# Patient Record
Sex: MALE | Race: WHITE
[De-identification: names, ages, dates, MRNs, and addresses within clinical notes are randomized per-mention and may not be internally consistent; named-entity substitution may affect disease eponyms.]

---

## 2018-04-09 NOTE — ED
Fall HPI





- General


Chief Complaint: Fall


Stated Complaint: fall


Time Seen by Provider: 04/08/18 23:37


Source: patient, EMS


Mode of arrival: EMS


Limitations: physical limitation (Appears intoxicated)





- History of Present Illness


Initial Comments: 





This patient is an 83-year-old man, brought by ambulance to be evaluated after 

he had a fall.  The patient admits to drinking about 10 beers today.  He states 

that he is not sure exactly what happened but he had a falling and striking 

left side of his head.  Patient's neighbor then called the ambulance as well as 

patient's daughter.  Is alert and is denying any injuries other than to his 

head.  He states he has a little bit of headache but he denies pains anywhere 

else.


MD Complaint: fall


-: hour(s)


Fall From: standing


When Fall Occurred: 1 hour PTA


Fall Witnessed: no


Place Fall Occurred: home


Loss of Consciousness: unsure


Prolonged Down Time?: no


Context: alcohol use





- Related Data


 Home Medications











 Medication  Instructions  Recorded  Confirmed


 


Ascorbic Acid [Vitamin C] 500 mg PO DAILY@1200 01/21/15 10/06/16


 


Aspirin 325 mg PO DAILY 01/21/15 10/06/16


 


Cholecalciferol [Vitamin D3] 1,000 unit PO DAILY@1200 01/21/15 10/06/16


 


Citalopram Hydrobromide [CeleXA] 20 mg PO DAILY 01/21/15 10/06/16


 


Fenofibrate Nanocrystallized 145 mg PO DAILY 01/21/15 10/06/16





[Tricor]   


 


Lisinopril [Zestril] 10 mg PO DAILY 01/21/15 10/06/16


 


Loratadine [Claritin] 10 mg PO DAILY 01/21/15 10/06/16


 


Thiamine HCl [Vitamin B-1] 100 mg PO DAILY 01/21/15 10/06/16








 Previous Rx's











 Medication  Instructions  Recorded


 


Docusate [Colace] 100 mg PO BID #28 capsule 10/08/16


 


Enoxaparin [Lovenox] 40 mg SQ DAILY 28 Days  syringe 10/08/16


 


HYDROcodone/APAP 5-325MG [Norco 1 tab PO Q4HR PRN #40 tab 10/08/16





5-325]  


 


Budesonide-Formot 160-4.5 Mcg 2 puff INHALATION BID #1 inhaler 10/12/16





[Symbicort 160-4.5 Mcg Inhaler]  


 


Ipratropium-Albuterol Nebulize 3 ml INHALATION RT-QID  ampul.neb 10/12/16





[Duoneb 0.5 mg-3 mg/3 ml Soln]  


 


Metoprolol Tartrate [Lopressor] 12.5 mg PO BID  tab 10/12/16


 


Nicotine 21Mg/24Hr Patch [Habitrol] 1 patch TRANSDERM DAILY  patch 10/12/16











 Allergies











Allergy/AdvReac Type Severity Reaction Status Date / Time


 


No Known Allergies Allergy   Verified 10/06/16 16:10














Review of Systems


ROS Statement: 


Those systems with pertinent positive or pertinent negative responses have been 

documented in the HPI.





ROS Other: All systems not noted in ROS Statement are negative.


Limitations: ROS unobtainable due to patients medical condition (Appears 

intoxicated)


Eyes: Denies: vision change


Respiratory: Denies: cough, dyspnea


Cardiovascular: Denies: chest pain


Gastrointestinal: Denies: abdominal pain, vomiting


Musculoskeletal: Denies: back pain


Neurological: Reports: headache.  Denies: weakness, numbness





Past Medical History


Past Medical History: CVA/TIA, Hyperlipidemia, Hypertension


History of Any Multi-Drug Resistant Organisms: None Reported


Past Surgical History: Hernia Repair, Orthopedic Surgery


Additional Past Surgical History / Comment(s): SHOULDER LEFT 25 YEARS AGO


Past Anesthesia/Blood Transfusion Reactions: No Reported Reaction


Past Psychological History: No Psychological Hx Reported


Smoking Status: Current every day smoker


Past Alcohol Use History: Daily


Past Drug Use History: None Reported





- Past Family History


  ** Mother


Family Medical History: Hypertension





General Exam


Limitations: no limitations


General appearance: alert, appears intoxicated


Head exam: Present: normocephalic, other (Left frontal area hematoma with some 

abrasion overlying.  No obvious bony deformity.  Mild localized tenderness.)


Eye exam: Present: normal appearance, PERRL, EOMI, nystagmus.  Absent: scleral 

icterus, conjunctival injection


ENT exam: Present: mucous membranes dry


Neck exam: Present: normal inspection, other (Cervical collar).  Absent: 

tenderness


Respiratory exam: Present: normal lung sounds bilaterally.  Absent: respiratory 

distress, wheezes, rales, rhonchi, stridor


Cardiovascular Exam: Present: regular rate, normal rhythm, normal heart sounds.

  Absent: systolic murmur, diastolic murmur, rubs, gallop


GI/Abdominal exam: Present: soft.  Absent: distended, tenderness, guarding, 

rebound, mass


Extremities exam: Present: normal inspection, full ROM, normal capillary 

refill.  Absent: tenderness


Back exam: Present: normal inspection.  Absent: CVA tenderness (R), CVA 

tenderness (L)


Neurological exam: Present: alert


Skin exam: Present: warm, dry, intact, normal color.  Absent: rash





Course


 Vital Signs











  04/08/18





  23:19


 


Temperature 96.3 F L


 


Pulse Rate 79


 


Respiratory 18





Rate 


 


Blood Pressure 166/74


 


O2 Sat by Pulse 92 L





Oximetry 














Medical Decision Making





- EKG Data


-: EKG Interpreted by Me


EKG shows normal: sinus rhythm, axis (Normal), intervals (Normal), QRS 

complexes (Low-voltage QRS), ST-T waves (Normal)


Rate: normal (78 bpm)





Disposition


Clinical Impression: 


 Fall, Head injury, Alcohol intoxication





Disposition: HOME SELF-CARE


Condition: Fair


Instructions:  Fall Prevention for Older Adults (ED), Head Injury (ED), Alcohol 

Intoxication (ED)


Referrals: 


José Manuel Flores DO [Primary Care Provider] - 1-2 days

## 2018-04-09 NOTE — CT
EXAMINATION TYPE: CT brain cspine wo con

 

DATE OF EXAM: 4/9/2018

 

COMPARISON: NONE

 

HISTORY: Fall

 

CT DLP: 1562.30 mGycm

Automated exposure control for dose reduction was used.

 

TECHNIQUE: CT scan of the head and cervical spine are performed without contrast.

 

FINDINGS:   There is a large left frontal scalp hematoma that measures up to 1.7 cm in thickness.

 

There is cerebral cortical atrophy. There is no mass effect nor midline shift. There is no sign of in
tracranial hemorrhage. There is patchy hypodensity in the periventricular white matter. There is no e
vidence of a skull fracture.

 

Cervical vertebra have normal alignment. There is narrowing of disc spaces from C3 to C7 with spurrin
g of the endplates. Facet joints are intact. There is hypertrophic multilevel facet arthropathy. The 
skull base is intact. There is no evidence of a fracture.

 

IMPRESSION:

Large left frontal scalp hematoma. Cerebral atrophy and chronic small vessel ischemia. No acute intra
cranial abnormality.

 

Spondylotic changes in the cervical spine. 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 No acute frac
ture seen. Pulmonary emphysema noted at the lung apices.

## 2018-08-08 NOTE — CT
EXAMINATION TYPE: CT brain cspine wo con

 

DATE OF EXAM: 8/8/2018

 

COMPARISON: CT brain and cervical spine April 8, 2018.

 

HISTORY: Pt fell on Monday found today on the floor /abrasions to lt cheek area. Neck pain.

 

CT DLP: 1429.3 mGycm. Automated Exposure Control for Dose Reduction was Utilized.

 

 

TECHNIQUE: CT scan of the head and cervical spine are performed without contrast.

 

FINDINGS:   There is no acute intracranial hemorrhage or midline shift identified. There is ventricul
ar and sulcal prominence consistent with diffuse cerebral atrophy. There is focal and confluent areas
 of T2 hyperintensity seen throughout the deep and periventricular white matter bilaterally. There is
 minimal residual left frontal scalp hematoma or scar axial image 41. There is new small right pariet
al acute scalp hematoma axial image 45. The calvarium is intact. The globes are intact and the visual
ized sinuses are clear.

 

Cervical spine is visualized in its entirety from C1 through upper thoracic levels and demonstrates s
table and somewhat straightened alignment without evidence of acute fracture or dislocation. Stable n
onunited fracture of C7 spinous process sagittal image 36 noted. Prevertebral soft tissue appears wit
hin normal limits.  The C1-C2 articulation is within normal limits on the coronal images.

 

Vertebral body heights are maintained. Moderate multilevel disc space narrowing C3-C4 through C6-C7 l
evels is redemonstrated. Posterior spurring effaces anterior thecal sac at C6-C7 level similar prior.
 Review of axial images shows multilevel uncovertebral facet degenerative changes contributing to mul
tilevel neural foraminal narrowing most prominent left C3-C4 level similar to prior. There is motion 
artifact degradation is seen. There is moderate to severe calcified plaque at bilateral carotid bulbs
. There is at least moderate underlying emphysematous change with moderate biapical pleural/parenchym
al scarring redemonstrated.

 

IMPRESSION:

1. There is no acute fracture or dislocation evident in the cervical spine. No significant change fro
m prior CT.

2. No acute intracranial hemorrhage or midline shift is seen currently. Background moderate diffuse c
erebral atrophy and advanced chronic small vessel ischemic change redemonstrated. New small right acu
te parietal scalp hematoma noted.

## 2018-08-08 NOTE — ED
General Adult HPI





- General


Stated complaint: Fall


Time Seen by Provider: 08/08/18 12:05


Source: patient, EMS, RN notes reviewed, old records reviewed


Mode of arrival: EMS





- History of Present Illness


Initial comments: 





This is a 83-year-old male history of CVA alcoholism who apparently became weak 

2 days ago and follow for cannot get up.  He was found lying on the floor on 

his left side.  Is brought in by EMS for evaluation.  Patient was C-collared he 

denies nausea vomiting cough or phlegm production.  He is found to have 

multiple pressure sores by paramedics IV was established.  He was transported 

here.





- Related Data


 Home Medications











 Medication  Instructions  Recorded  Confirmed


 


Ascorbic Acid [Vitamin C] 500 mg PO DAILY@1200 01/21/15 08/08/18


 


Cholecalciferol [Vitamin D3] 1,000 unit PO DAILY@1200 01/21/15 08/08/18


 


Citalopram Hydrobromide [CeleXA] 30 mg PO DAILY 01/21/15 08/08/18


 


Fenofibrate Nanocrystallized 145 mg PO DAILY 01/21/15 08/08/18





[Tricor]   


 


Lisinopril [Zestril] 10 mg PO DAILY 01/21/15 08/08/18


 


Thiamine HCl [Vitamin B-1] 100 mg PO DAILY 01/21/15 08/08/18


 


Aspirin EC [Ecotrin Low Dose] 81 mg PO DAILY 08/08/18 08/08/18











 Allergies











Allergy/AdvReac Type Severity Reaction Status Date / Time


 


No Known Allergies Allergy   Verified 10/06/16 16:10














Review of Systems


ROS Statement: 


Those systems with pertinent positive or pertinent negative responses have been 

documented in the HPI.





ROS Other: All systems not noted in ROS Statement are negative.





Past Medical History


Past Medical History: CVA/TIA, Hyperlipidemia, Hypertension


History of Any Multi-Drug Resistant Organisms: None Reported


Past Surgical History: Hernia Repair, Orthopedic Surgery


Additional Past Surgical History / Comment(s): SHOULDER LEFT 25 YEARS AGO


Past Anesthesia/Blood Transfusion Reactions: No Reported Reaction


Past Psychological History: No Psychological Hx Reported


Smoking Status: Current every day smoker


Past Alcohol Use History: Daily


Past Drug Use History: None Reported





- Past Family History


  ** Mother


Family Medical History: Hypertension





General Exam





- General Exam Comments


Initial Comments: 





This is a well-developed sec appearing male who is awake alert but lethargic.  

He does have a cervical collar on.  He seems a have a Sonia Coma Scale of 15


Limitations: physical limitation


General appearance: alert, lethargic


Head exam: Present: normocephalic, other (Pressure sores seen to the left cheek 

and facial area with left periorbital edema)


Eye exam: Present: PERRL, EOMI


ENT exam: Present: mucous membranes dry


Neck exam: Present: normal inspection, other (Cervical collar in place no overt 

tenderness palpation of the spinous processes.)


Respiratory exam: Present: decreased breath sounds


Cardiovascular Exam: Present: normal rhythm, tachycardia, normal heart sounds.  

Absent: systolic murmur, diastolic murmur, rubs, gallop, clicks


GI/Abdominal exam: Present: soft.  Absent: tenderness


Rectal exam: Present: deferred


Extremities exam: Present: full ROM, normal capillary refill


Back exam: Present: normal inspection


Neurological exam: Present: alert, CN II-XII intact


Psychiatric exam: Present: flat affect


Skin exam: Present: warm, dry, other (Multiple pressure sores noted to the left 

face left shoulder left anterior chest and lower chest.  Left dorsal wrist and 

forearm left knee and left anterior lateral leg with some small amount seen on 

the right knee.  Some eschar formation is noted.)





Course


 Vital Signs











  08/08/18 08/08/18 08/08/18





  12:05 13:00 14:00


 


Temperature 97.1 F L  


 


Pulse Rate 105 H 105 H 95


 


Respiratory 18 18 18





Rate   


 


Blood Pressure 140/76 136/76 138/75


 


O2 Sat by Pulse 100 98 98





Oximetry   














  08/08/18





  14:32


 


Temperature 


 


Pulse Rate 97


 


Respiratory 18





Rate 


 


Blood Pressure 128/82


 


O2 Sat by Pulse 98





Oximetry 














- Reevaluation(s)


Reevaluation #1: 





08/08/18 15:49


Patient continues rest comfortably I did discuss findings with him and his 

family member.





EKG Findings





- EKG Results:


EKG: interpreted by [facility]MRON, sinus rhythm (Sinus rhythm rate 99.  Interval 136 QRS 

90 QT since /479 low-voltage poor R-wave progression)





Medical Decision Making





- Medical Decision Making





I did discuss findings with the patient family members as well as with Dr. Gonzalez.  Patient is a patient Dr. Flores.  Patient will be admitted IV 

hydration and inpatient evaluation.  Patient has a known history of smoking and 

alcohol abuse he will be placed on the protocol.





- Lab Data


Result diagrams: 


 08/08/18 13:40





 08/08/18 13:40


 Lab Results











  08/08/18 08/08/18 08/08/18 Range/Units





  13:40 13:40 13:40 


 


WBC     (3.8-10.6)  k/uL


 


RBC     (4.30-5.90)  m/uL


 


Hgb     (13.0-17.5)  gm/dL


 


Hct     (39.0-53.0)  %


 


MCV     (80.0-100.0)  fL


 


MCH     (25.0-35.0)  pg


 


MCHC     (31.0-37.0)  g/dL


 


RDW     (11.5-15.5)  %


 


Plt Count     (150-450)  k/uL


 


Neutrophils %     %


 


Lymphocytes %     %


 


Monocytes %     %


 


Eosinophils %     %


 


Basophils %     %


 


Neutrophils #     (1.3-7.7)  k/uL


 


Lymphocytes #     (1.0-4.8)  k/uL


 


Monocytes #     (0-1.0)  k/uL


 


Eosinophils #     (0-0.7)  k/uL


 


Basophils #     (0-0.2)  k/uL


 


Sodium   142   (137-145)  mmol/L


 


Potassium   4.3   (3.5-5.1)  mmol/L


 


Chloride   111 H   ()  mmol/L


 


Carbon Dioxide   23   (22-30)  mmol/L


 


Anion Gap   8   mmol/L


 


BUN   50 H   (9-20)  mg/dL


 


Creatinine   1.75 H   (0.66-1.25)  mg/dL


 


Est GFR (CKD-EPI)AfAm   41   (>60 ml/min/1.73 sqM)  


 


Est GFR (CKD-EPI)NonAf   36   (>60 ml/min/1.73 sqM)  


 


Glucose   133 H   (74-99)  mg/dL


 


POC Glucose (mg/dL)     (75-99)  mg/dL


 


POC Glu Operater ID     


 


Plasma Lactic Acid Alberto  2.0    (0.7-2.0)  mmol/L


 


Calcium   9.8   (8.4-10.2)  mg/dL


 


Magnesium   2.4 H   (1.6-2.3)  mg/dL


 


Total Bilirubin   1.4 H   (0.2-1.3)  mg/dL


 


AST   98 H   (17-59)  U/L


 


ALT   51   (21-72)  U/L


 


Alkaline Phosphatase   71   ()  U/L


 


Ammonia  23    (<30)  umol/L


 


Total Creatine Kinase    1323 H  ()  U/L


 


CK-MB (CK-2)    7.6 H*  (0.0-2.4)  ng/mL


 


CK-MB (CK-2) Rel Index    0.6  


 


Troponin I    0.141 H*  (0.000-0.034)  ng/mL


 


Total Protein   6.5   (6.3-8.2)  g/dL


 


Albumin   3.8   (3.5-5.0)  g/dL


 


Lipase   37   ()  U/L


 


Urine Color     


 


Urine Appearance     (Clear)  


 


Urine pH     (5.0-8.0)  


 


Ur Specific Gravity     (1.001-1.035)  


 


Urine Protein     (Negative)  


 


Urine Glucose (UA)     (Negative)  


 


Urine Ketones     (Negative)  


 


Urine Blood     (Negative)  


 


Urine Nitrite     (Negative)  


 


Urine Bilirubin     (Negative)  


 


Urine Urobilinogen     (<2.0)  mg/dL


 


Ur Leukocyte Esterase     (Negative)  


 


Urine RBC     (0-5)  /hpf


 


Urine WBC     (0-5)  /hpf


 


Ur Squamous Epith Cells     (0-4)  /hpf


 


Hyaline Casts     (0-2)  /lpf


 


Urine Mucus     (None)  /hpf


 


Serum Alcohol   <10   mg/dL














  08/08/18 08/08/18 08/08/18 Range/Units





  13:40 14:59 15:10 


 


WBC  12.5 H    (3.8-10.6)  k/uL


 


RBC  5.37    (4.30-5.90)  m/uL


 


Hgb  16.3    (13.0-17.5)  gm/dL


 


Hct  50.2    (39.0-53.0)  %


 


MCV  93.5    (80.0-100.0)  fL


 


MCH  30.3    (25.0-35.0)  pg


 


MCHC  32.4    (31.0-37.0)  g/dL


 


RDW  13.6    (11.5-15.5)  %


 


Plt Count  401    (150-450)  k/uL


 


Neutrophils %  82    %


 


Lymphocytes %  7    %


 


Monocytes %  9    %


 


Eosinophils %  0    %


 


Basophils %  0    %


 


Neutrophils #  10.2 H    (1.3-7.7)  k/uL


 


Lymphocytes #  0.9 L    (1.0-4.8)  k/uL


 


Monocytes #  1.1 H    (0-1.0)  k/uL


 


Eosinophils #  0.0    (0-0.7)  k/uL


 


Basophils #  0.0    (0-0.2)  k/uL


 


Sodium     (137-145)  mmol/L


 


Potassium     (3.5-5.1)  mmol/L


 


Chloride     ()  mmol/L


 


Carbon Dioxide     (22-30)  mmol/L


 


Anion Gap     mmol/L


 


BUN     (9-20)  mg/dL


 


Creatinine     (0.66-1.25)  mg/dL


 


Est GFR (CKD-EPI)AfAm     (>60 ml/min/1.73 sqM)  


 


Est GFR (CKD-EPI)NonAf     (>60 ml/min/1.73 sqM)  


 


Glucose     (74-99)  mg/dL


 


POC Glucose (mg/dL)   135 H   (75-99)  mg/dL


 


POC Glu Operater ID   Arun Jones   


 


Plasma Lactic Acid Alberto     (0.7-2.0)  mmol/L


 


Calcium     (8.4-10.2)  mg/dL


 


Magnesium     (1.6-2.3)  mg/dL


 


Total Bilirubin     (0.2-1.3)  mg/dL


 


AST     (17-59)  U/L


 


ALT     (21-72)  U/L


 


Alkaline Phosphatase     ()  U/L


 


Ammonia     (<30)  umol/L


 


Total Creatine Kinase     ()  U/L


 


CK-MB (CK-2)     (0.0-2.4)  ng/mL


 


CK-MB (CK-2) Rel Index     


 


Troponin I     (0.000-0.034)  ng/mL


 


Total Protein     (6.3-8.2)  g/dL


 


Albumin     (3.5-5.0)  g/dL


 


Lipase     ()  U/L


 


Urine Color    Orange  


 


Urine Appearance    Cloudy  (Clear)  


 


Urine pH    5.5  (5.0-8.0)  


 


Ur Specific Gravity    1.020  (1.001-1.035)  


 


Urine Protein    1+ H  (Negative)  


 


Urine Glucose (UA)    Negative  (Negative)  


 


Urine Ketones    Negative  (Negative)  


 


Urine Blood    Negative  (Negative)  


 


Urine Nitrite    Negative  (Negative)  


 


Urine Bilirubin    1+ H  (Negative)  


 


Urine Urobilinogen    2.0  (<2.0)  mg/dL


 


Ur Leukocyte Esterase    Negative  (Negative)  


 


Urine RBC    <1  (0-5)  /hpf


 


Urine WBC    2  (0-5)  /hpf


 


Ur Squamous Epith Cells    1  (0-4)  /hpf


 


Hyaline Casts    35 H  (0-2)  /lpf


 


Urine Mucus    Moderate H  (None)  /hpf


 


Serum Alcohol     mg/dL














- Radiology Data


Radiology results: report reviewed (I did review the imaging and report no 

acute findings are seen.), image reviewed





Critical Care Time


Critical Care Time: Yes


Critical Care Time: 





34 minutes of critical care time which includes initial presentation monitoring 

the EMS run and discussed with paramedics labs x-rays.  Multiple re-evaluations 

of the patient.  Discussion with the family discuss with the main physician 

documentation above and admission orders





Disposition


Clinical Impression: 


 Fall, History of ETOH abuse, Rhabdomyolysis, Renal insufficiency, Dehydration, 

Failure to thrive, Smoking, Elevated troponin





Disposition: ADMITTED AS IP TO THIS HOSP


Condition: Serious


Referrals: 


José Manuel Flores DO [Primary Care Provider] - 1-2 days

## 2018-08-08 NOTE — XR
EXAMINATION TYPE: XR chest 1V portable

 

DATE OF EXAM: 8/8/2018

 

COMPARISON: Prior chest x-ray 10/11/2016

 

HISTORY: Pain, weakness and fall, trauma

 

TECHNIQUE: Single frontal view of the chest is obtained.

 

FINDINGS:  Posttraumatic changes are stable, postop change noted to the left humerus, old left clavic
ular fracture, old right-sided rib fractures at the seventh and eighth ribs, likely ninth and 10th ri
bs again noted, possibly left eighth and ninth ribs show prior fracture. There are overlying cardiac 
leads. No evident pneumothorax or pleural effusion. Cardiac mediastinal silhouette, pulmonary vascula
rity and elin are stable.

 

IMPRESSION:  Evidence of remote trauma. No acute abnormality.

## 2018-08-08 NOTE — ED
Medical Decision Making





- Lab Data


Result diagrams: 


 08/08/18 13:40





 08/08/18 13:40





 Lab Results











  08/08/18 08/08/18 08/08/18 Range/Units





  13:40 13:40 13:40 


 


WBC     (3.8-10.6)  k/uL


 


RBC     (4.30-5.90)  m/uL


 


Hgb     (13.0-17.5)  gm/dL


 


Hct     (39.0-53.0)  %


 


MCV     (80.0-100.0)  fL


 


MCH     (25.0-35.0)  pg


 


MCHC     (31.0-37.0)  g/dL


 


RDW     (11.5-15.5)  %


 


Plt Count     (150-450)  k/uL


 


Neutrophils %     %


 


Lymphocytes %     %


 


Monocytes %     %


 


Eosinophils %     %


 


Basophils %     %


 


Neutrophils #     (1.3-7.7)  k/uL


 


Lymphocytes #     (1.0-4.8)  k/uL


 


Monocytes #     (0-1.0)  k/uL


 


Eosinophils #     (0-0.7)  k/uL


 


Basophils #     (0-0.2)  k/uL


 


Sodium   142   (137-145)  mmol/L


 


Potassium   4.3   (3.5-5.1)  mmol/L


 


Chloride   111 H   ()  mmol/L


 


Carbon Dioxide   23   (22-30)  mmol/L


 


Anion Gap   8   mmol/L


 


BUN   50 H   (9-20)  mg/dL


 


Creatinine   1.75 H   (0.66-1.25)  mg/dL


 


Est GFR (CKD-EPI)AfAm   41   (>60 ml/min/1.73 sqM)  


 


Est GFR (CKD-EPI)NonAf   36   (>60 ml/min/1.73 sqM)  


 


Glucose   133 H   (74-99)  mg/dL


 


POC Glucose (mg/dL)     (75-99)  mg/dL


 


POC Glu Operater ID     


 


Plasma Lactic Acid Alberto  2.0    (0.7-2.0)  mmol/L


 


Calcium   9.8   (8.4-10.2)  mg/dL


 


Magnesium   2.4 H   (1.6-2.3)  mg/dL


 


Total Bilirubin   1.4 H   (0.2-1.3)  mg/dL


 


AST   98 H   (17-59)  U/L


 


ALT   51   (21-72)  U/L


 


Alkaline Phosphatase   71   ()  U/L


 


Ammonia  23    (<30)  umol/L


 


Total Creatine Kinase    1323 H  ()  U/L


 


CK-MB (CK-2)    7.6 H*  (0.0-2.4)  ng/mL


 


CK-MB (CK-2) Rel Index    0.6  


 


Troponin I    0.141 H*  (0.000-0.034)  ng/mL


 


Total Protein   6.5   (6.3-8.2)  g/dL


 


Albumin   3.8   (3.5-5.0)  g/dL


 


Lipase   37   ()  U/L


 


Urine Color     


 


Urine Appearance     (Clear)  


 


Urine pH     (5.0-8.0)  


 


Ur Specific Gravity     (1.001-1.035)  


 


Urine Protein     (Negative)  


 


Urine Glucose (UA)     (Negative)  


 


Urine Ketones     (Negative)  


 


Urine Blood     (Negative)  


 


Urine Nitrite     (Negative)  


 


Urine Bilirubin     (Negative)  


 


Urine Urobilinogen     (<2.0)  mg/dL


 


Ur Leukocyte Esterase     (Negative)  


 


Urine RBC     (0-5)  /hpf


 


Urine WBC     (0-5)  /hpf


 


Ur Squamous Epith Cells     (0-4)  /hpf


 


Hyaline Casts     (0-2)  /lpf


 


Urine Mucus     (None)  /hpf


 


Serum Alcohol   <10   mg/dL














  08/08/18 08/08/18 08/08/18 Range/Units





  13:40 14:59 15:10 


 


WBC  12.5 H    (3.8-10.6)  k/uL


 


RBC  5.37    (4.30-5.90)  m/uL


 


Hgb  16.3    (13.0-17.5)  gm/dL


 


Hct  50.2    (39.0-53.0)  %


 


MCV  93.5    (80.0-100.0)  fL


 


MCH  30.3    (25.0-35.0)  pg


 


MCHC  32.4    (31.0-37.0)  g/dL


 


RDW  13.6    (11.5-15.5)  %


 


Plt Count  401    (150-450)  k/uL


 


Neutrophils %  82    %


 


Lymphocytes %  7    %


 


Monocytes %  9    %


 


Eosinophils %  0    %


 


Basophils %  0    %


 


Neutrophils #  10.2 H    (1.3-7.7)  k/uL


 


Lymphocytes #  0.9 L    (1.0-4.8)  k/uL


 


Monocytes #  1.1 H    (0-1.0)  k/uL


 


Eosinophils #  0.0    (0-0.7)  k/uL


 


Basophils #  0.0    (0-0.2)  k/uL


 


Sodium     (137-145)  mmol/L


 


Potassium     (3.5-5.1)  mmol/L


 


Chloride     ()  mmol/L


 


Carbon Dioxide     (22-30)  mmol/L


 


Anion Gap     mmol/L


 


BUN     (9-20)  mg/dL


 


Creatinine     (0.66-1.25)  mg/dL


 


Est GFR (CKD-EPI)AfAm     (>60 ml/min/1.73 sqM)  


 


Est GFR (CKD-EPI)NonAf     (>60 ml/min/1.73 sqM)  


 


Glucose     (74-99)  mg/dL


 


POC Glucose (mg/dL)   135 H   (75-99)  mg/dL


 


POC Glu Operater ID   Arun Jones   


 


Plasma Lactic Acid Alberto     (0.7-2.0)  mmol/L


 


Calcium     (8.4-10.2)  mg/dL


 


Magnesium     (1.6-2.3)  mg/dL


 


Total Bilirubin     (0.2-1.3)  mg/dL


 


AST     (17-59)  U/L


 


ALT     (21-72)  U/L


 


Alkaline Phosphatase     ()  U/L


 


Ammonia     (<30)  umol/L


 


Total Creatine Kinase     ()  U/L


 


CK-MB (CK-2)     (0.0-2.4)  ng/mL


 


CK-MB (CK-2) Rel Index     


 


Troponin I     (0.000-0.034)  ng/mL


 


Total Protein     (6.3-8.2)  g/dL


 


Albumin     (3.5-5.0)  g/dL


 


Lipase     ()  U/L


 


Urine Color    Orange  


 


Urine Appearance    Cloudy  (Clear)  


 


Urine pH    5.5  (5.0-8.0)  


 


Ur Specific Gravity    1.020  (1.001-1.035)  


 


Urine Protein    1+ H  (Negative)  


 


Urine Glucose (UA)    Negative  (Negative)  


 


Urine Ketones    Negative  (Negative)  


 


Urine Blood    Negative  (Negative)  


 


Urine Nitrite    Negative  (Negative)  


 


Urine Bilirubin    1+ H  (Negative)  


 


Urine Urobilinogen    2.0  (<2.0)  mg/dL


 


Ur Leukocyte Esterase    Negative  (Negative)  


 


Urine RBC    <1  (0-5)  /hpf


 


Urine WBC    2  (0-5)  /hpf


 


Ur Squamous Epith Cells    1  (0-4)  /hpf


 


Hyaline Casts    35 H  (0-2)  /lpf


 


Urine Mucus    Moderate H  (None)  /hpf


 


Serum Alcohol     mg/dL














Disposition


Clinical Impression: 


 Fall, History of ETOH abuse, Rhabdomyolysis, Renal insufficiency, Dehydration, 

Failure to thrive, Smoking, Elevated troponin, Pressure sore





Disposition: ADMITTED AS IP TO THIS HOSP


Condition: Serious


Referrals: 


José Manuel Flores DO [Primary Care Provider] - 1-2 days

## 2018-08-09 NOTE — P.CON
Consult Note





- .


Consult date: 08/09/18


Assessment/Plan:: 





This is an 83-year-old  male patient who currently lives alone.  He 

apparently fell on Monday and was laying on the floor until yesterday.  He 

states that he was feeling dizzy and fell.  He drank 3 beers that day but 

according to his daughter he states he drinks up to a 12 pack a day and has 

been doing this for many years.  Patient states that he was laying on his left 

side and his left hand and arm were underneath him and he was unable to move it 

out.  Patient was transported by EMS to [facility] emergency 

center for evaluation.  His ammonia level was 23, blood sugar in the 130s total 

bilirubin 1.4, AST 90, applying phosphatase and ALT within normal limits, CK 

was 1323.  BUN 15 creatinine 1.75 white count was at 12.  Troponin 0.141, 0.113

, 0.089.  Alcohol level was less than 10.  Urinalysis was orange with 1+ 

bilirubin.  Chest x-ray shows no acute abnormality but multiple old fractures.  

CAT scan of the head and cervical spine showed no acute fracture or dislocation 

of the cervical spine.  No acute intracranial hemorrhage or midline shift.  

Moderate diffuse cerebral atrophy and advanced chronic small vessel ischemic 

change.  New right parietal scalp hematoma.  Patient has been afebrile since 

admission.  He is not on antibiotics.  His tetanus status was updated in the 

emergency center.  Patient presents with multiple abrasions and wounds to his 

left temple and cheek, left hand and wrist dorsal and palmar surfaces, multiple 

areas on his left chest and abdomen as well as left lower extremity.  According 

to the patient's daughter patient has frequent falls although he will deny at.  

I discussed discharge planning with the patient and he is open to going to 

[facility] because he was there about 2 years ago after a hip 

fracture.  Daughter is DURABLE POWER OF  and family are hoping that 

they can make arrangements so he does not return home.  He has received 3 L of 

IV fluid, thiamine and CIWA protocol in place.  He does state that he has not 

been eating much due to lack of appetite and has lost 15 lbs over the past 

couple months.  He denies any nausea, vomiting, diarrhea.  He denies chest pain

, shortness of breath.  He has a chronic productive cough.  Please see the 

consult note is dictated by nurse practitioner Mrs. Barbra Bernstein.


The patient's daughter is present in relates to the patient's worsening status 

and concerns to her ability to care for himself.  The patient is evidence of 

the multiple wounds that developed from his fall and being unable to buy his 

own power get up off the floor.  We'll place mupirocin over the wounds to the 

face as well as to the chest wall and abdomen area.  To the left hand and 

palmar surface Silvadene will be applied will also be applied to the ulceration 

to the left leg and wrapped and place nonstick with gauze.


Antibiotic therapy originally was considered with vancomycin however with his 

elevated creatinine his changed to daptomycin while cultures are pending.  We'

ll obtain a whole-body bone scan to evaluate the areas of pressure to determine 

if there is potential underlying bony infection.  I agree with evaluation, 

assessment and plan as dictated by nurse practitioner Mrs. Barbra Bernstein.

## 2018-08-09 NOTE — HP
HISTORY AND PHYSICAL



DATE OF ADMISSION:

8/8/18



PRESENTING COMPLAINT:

Fall.



HISTORY OF PRESENTING COMPLAINT:

Pleasant 83-year-old patient of Dr. Flores.  Chronic stable medical conditions include

hyperlipidemia, hypertension, osteoarthritis, and tremors.  The patient is irregular at

baseline on his gait, but does not use any support.  The patient presented to the ER.

Daughter is at the bedside.  Last she talked on Sunday.  It was felt patient was on the

floor for at least a day and a half.  The patient does not remember what actually

happened.  Does not remember having any chest pains or palpitations.  The patient has

been dizzy at times.  No focal weakness.  The patient was found on the left side down

with a breakdown of skin in the left upper extremity, left hip, left lower extremity

where he was in contact with the ground.  Does feel tired and run down.  The patient

has been drinking for a long time, about 12 pack of beer and also up to about 3 packs

of cigarettes a day. Pretty run down. The patient in the ER was found to be in renal

failure.  Troponin leak.



REVIEW OF SYSTEMS:

CONSTITUTIONAL: Tired.

HEENT:  Decreased hearing.

RESPIRATORY: Some short of breath, cough.

CARDIOVASCULAR: No chest pain.

GASTROINTESTINAL: None.

GENITOURINARY: None.

MUSCULOSKELETAL:  Arthritic pain in many joints.

DERMATOLOGICAL:  Breakdown of skin especially on the left side as above.

HEMATOLOGICAL: None.

LYMPHATICS: None.

PSYCHIATRY: A bit forgetful.

NEUROLOGICAL: Nil focal.



PAST MEDICAL HISTORY:

Stroke, hyperlipidemia, hypertension, osteoarthritis, alcoholism, left humeral, left

femoral fracture, kidney stones.



PAST SURGICAL HISTORY:

Hernia repair, shoulder surgery, left hip hemiarthroplasty.



SOCIAL HISTORY:

Lives by himself, has a walking stick, does not use.  Smokes about 3 packs a day for

close to 70 years.  Also been drinking 12 pack of beer for a long time.  He worked as a

.



FAMILY HISTORY:

Hypertension.



HOME MEDICATIONS:

1. Thiamine 100 mg a day.

2. Zestril 10 mg a day.

3. Tricor 145 mg a day.

4. Celexa 30 mg a day.

5. Vitamin D3 1000 units a day.

6. Aspirin 81 mg a day.

7. Vitamin C 500 mg p.o. daily.



ALLERGIES:

None.



PHYSICAL EXAMINATION:

Vital signs on presentation: Temperature 97.5, pulse 93, respiratory 18, blood pressure

154/89, pulse ox 93% on 2 L.

GENERAL APPEARANCE:  Thin built, BMI 18.2, lying in bed, tired appearing.

EYES:  Pupils equal. Conjunctivae pale.

HEENT:  External nose and ears normal. Oral cavity dry.

NECK: JVD unable to assess.  Mass not palpable.

RESPIRATORY: Effort increased.

LUNGS:  Diminished breath sounds, some wheezing.

CARDIOVASCULAR: First and second sounds normal.  No edema.

ABDOMEN:  Soft, nontender.  Liver and spleen not palpable.

LYMPHATIC: No lymph node palpable in neck or axillae.

PSYCHIATRY: Awake, able to answer simple questions.

NEUROLOGICAL: Pupils equal. No facial asymmetry.  Power and sensation grossly intact.

MUSCULOSKELETAL: Evidence of osteoarthritis especially in the hands and knees.

DERMATOLOGICAL: The patient has got a breakdown of the skin, left upper extremity

lateral part of the left thigh and left lower extremity below the knee with skin

abrasion and area of redness.



INVESTIGATIONS:

White count 12.5, hemoglobin 16.3, potassium 4.3, BUN 50, creatinine 1.75, bilirubin

1.4.  CPK 1323, troponin 0.14, 0.11, 0.089.  UA with serum alcohol less than 10. Chest

x-ray film interpreted by me, shows some hyperexpansion.  No obvious infiltrate.  Head,

cervical spine CT; no fracture, though there is evidence of osteoarthritis.  EKG

tracing interpreted by me shows low voltage EKG.



ASSESSMENT:

1. The patient is found on the ground, cause of unconsciousness unknown.  The patient

    is not sure how long he was there for, probably at least a day and a half per the

    daughter.

2. Multiple skin bruising especially on the left side of the body with areas of de

    amanda with secondary infection.

3. Chronic alcoholic dependence.

4. Chronic nicotine dependence.

5. Chronic obstructive pulmonary disease in a current smoker.

6. Depression, not otherwise specified.

7. Primary osteoarthritis of multiple joints, bilateral.

8. Acute renal failure could be prerenal and acute tubular necrosis from

    rhabdomyolysis.

9. Acute rhabdomyolysis secondary to fall.



PLAN:

The patient will be given the IV hydration.  Will DC patient's Zestril.  Repeat

electrolytes in the morning.  Will put the patient on a small dose of Valium for DT

prophylaxis.  CIWA scale will be initiated.  Local wound care to continue.  Patient is

started on vancomycin in the ER. Would like to discontinued that in view of renal

failure.  Infectious disease Dr. Cuevas was consulted for the wounds. PT, OT is being

consulted. Cardiology was consulted from the ER.  Most likely the troponin leak is in

the setting of renal failure though. Acute MI cannot be ruled out given that patient

was on the floor for at least 48 hours and this could be a downward trend of troponin

as patient does not remember much. I did speak at length to the patient's daughter at

the bedside.





MMODL / IJN: [id number] / Job#: [id number]

## 2018-08-09 NOTE — P.CONS
History of Present Illness





- Reason for Consult


Consult date: 18


Decubitus ulcers





- History of Present Illness





This is an 83-year-old  male patient who currently lives alone.  He 

apparently fell on Monday and was laying on the floor until yesterday.  He 

states that he was feeling dizzy and fell.  He drank 3 beers that day but 

according to his daughter he states he drinks up to a 12 pack a day and has 

been doing this for many years.  Patient states that he was laying on his left 

side and his left hand and arm were underneath him and he was unable to move it 

out.  Patient was transported by EMS to [facility] emergency 

center for evaluation.  His ammonia level was 23, blood sugar in the 130s total 

bilirubin 1.4, AST 90, applying phosphatase and ALT within normal limits, CK 

was 1323.  BUN 15 creatinine 1.75 white count was at 12.  Troponin 0.141, 0.113

, 0.089.  Alcohol level was less than 10.  Urinalysis was orange with 1+ 

bilirubin.  Chest x-ray shows no acute abnormality but multiple old fractures.  

CAT scan of the head and cervical spine showed no acute fracture or dislocation 

of the cervical spine.  No acute intracranial hemorrhage or midline shift.  

Moderate diffuse cerebral atrophy and advanced chronic small vessel ischemic 

change.  New right parietal scalp hematoma.  Patient has been afebrile since 

admission.  He is not on antibiotics.  His tetanus status was updated in the 

emergency center.  Patient presents with multiple abrasions and wounds to his 

left temple and cheek, left hand and wrist dorsal and palmar surfaces, multiple 

areas on his left chest and abdomen as well as left lower extremity.  According 

to the patient's daughter patient has frequent falls although he will deny at.  

I discussed discharge planning with the patient and he is open to going to 

[facility] because he was there about 2 years ago after a hip 

fracture.  Daughter is DURABLE POWER OF  and family are hoping that 

they can make arrangements so he does not return home.  He has received 3 L of 

IV fluid, thiamine and CIWA protocol in place.  He does state that he has not 

been eating much due to lack of appetite and has lost 15 lbs over the past 

couple months.  He denies any nausea, vomiting, diarrhea.  He denies chest pain

, shortness of breath.  He has a chronic productive cough.





Review of Systems


All systems: negative


Constitutional: Reports poor appetite, Reports weakness, Reports weight loss, 

Denies chills, Denies fever, Denies night sweats


Eyes: denies blurred vision, denies pain


Ears, nose, mouth and throat: Reports vertigo, Denies dental pain, Denies 

headache, Denies mouth pain, Denies sore throat


Cardiovascular: Reports lightheadedness, Denies chest pain, Denies leg edema, 

Denies shortness of breath, Denies syncope


Respiratory: Reports cough, Reports cough with sputum, Denies dyspnea, Denies 

excessive sputum, Denies hemoptysis, Denies home oxygen, Denies wheezing


Gastrointestinal: Reports loss of appetite, Denies abdominal pain, Denies 

diarrhea, Denies nausea, Denies vomiting


Genitourinary: Denies dysuria, Denies hematuria


Musculoskeletal: Reports frequent falls, Reports gait dysfunction, Denies 

myalgias


Integumentary: Reports onychomycosis, Reports wounds, Denies pruritus, Denies 

rash


Neurological: Denies numbness, Denies weakness


Psychiatric: Denies anxiety, Denies depression


Endocrine: Denies fatigue, Denies weight change





Past Medical History


Past Medical History: CVA/TIA, Hyperlipidemia, Hypertension, Osteoarthritis (OA)


Additional Past Medical History / Comment(s): tia, alcoholic, freq falls. past 

lt humeral/lt femoral fx, kidney stone 40 plus years ago. at times tremors, 

irreg sleep apttern


History of Any Multi-Drug Resistant Organisms: None Reported


Past Surgical History: Hernia Repair, Orthopedic Surgery


Additional Past Surgical History / Comment(s): SHOULDER LEFT 25 YEARS AGO, lt 

hip hemiarthroplasty


Past Anesthesia/Blood Transfusion Reactions: No Reported Reaction


Smoking Status: Current every day smoker


Additional Past Alcohol Use History / Comment(s): Patient is a smoker of 3 

packs per day since he was 14 years of age.  He is drinking a 12 pack of beer 

per day for a long period of time.  He is currently living alone.  He has 

worked in the past as a .





- Past Family History


  ** Mother


Family Medical History: Hypertension





  ** Father


Additional Family Medical History / Comment(s):  in [place] when pt was 12 

years old





Medications and Allergies


 Home Medications











 Medication  Instructions  Recorded  Confirmed  Type


 


Ascorbic Acid [Vitamin C] 500 mg PO DAILY@1200 01/21/15 08/08/18 History


 


Cholecalciferol [Vitamin D3] 1,000 unit PO DAILY@1200 01/21/15 08/08/18 History


 


Citalopram Hydrobromide [CeleXA] 30 mg PO DAILY 01/21/15 08/08/18 History


 


Fenofibrate Nanocrystallized 145 mg PO DAILY 01/21/15 08/08/18 History





[Tricor]    


 


Lisinopril [Zestril] 10 mg PO DAILY 01/21/15 08/08/18 History


 


Thiamine HCl [Vitamin B-1] 100 mg PO DAILY 01/21/15 08/08/18 History


 


Aspirin EC [Ecotrin Low Dose] 81 mg PO DAILY 18 History











 Allergies











Allergy/AdvReac Type Severity Reaction Status Date / Time


 


No Known Allergies Allergy   Verified 10/06/16 16:10














Physical Exam


Vitals: 


 Vital Signs











  Temp Pulse Pulse Resp BP BP Pulse Ox


 


 18 07:56    104 H  16   


 


 18 07:26  97.2 F L   104 H  16   143/86  94 L


 


 18 04:30  98.5 F   100  17   145/72  96


 


 18 04:10    88  17   


 


 18 00:15  98.3 F   98  17   142/76  95


 


 18 20:15  98.0 F   101 H  17   152/88  94 L


 


 18 16:47  97.5 F L  93   18  154/89   93 L


 


 18 14:32   97   18  128/82   98


 


 18 14:00   95   18  138/75   98


 


 18 13:00   105 H   18  136/76   98


 


 18 12:05  97.1 F L  105 H   18  140/76   100








 Intake and Output











 18





 22:59 06:59 14:59


 


Intake Total   510


 


Balance   510


 


Intake:   


 


  Intake, IV Titration   150





  Amount   


 


    Sodium Chloride 0.9% 1,   150





    000 ml @ 150 mls/hr IV .   





    Q6H40M [facility] Rx#:[id number]   


 


  Oral   360


 


Other:   


 


  Voiding Method Diaper Diaper Urinal





 Incontinent Incontinent Diaper





   Incontinent


 


  # Voids  4 


 


  Weight  56 kg 

















Gen: This is an 83-year-old.  Patient is in bed and appears to be comfortable 

and in no acute distress.  Patient does have pain with minimal movement 

secondary to significant wounds.


HEENT: Head is normocephalic.  Ulcer to the left temple and left cheek with 

serous drainage.  Pupils equal, round. Sclerae is anicteric.  Conjunctiva 

somewhat pale.  Mucous members of the mouth are moist.  Dentures in place on 

the upper.  No lesions noted.


NECK: Supple. No JVD. No lymphadenopathy. No thyromegaly. 


LUNGS: Clear to auscultation. No wheezes or rhonchi.  No intercostal 

retractions.


HEART: Regular rate and rhythm. No murmur. 


ABDOMEN: Soft. Bowel sounds are present. No masses.  No tenderness.


EXTREMITIES: No pedal edema.  No calf tenderness.  Dorsalis pedis +1 

bilaterally.  Onychomycosis noted bilaterally.


SKIN: There are multiple decubitus ulcers on the left chest wall and left 

abdomen with surrounding erythema and edema.  Large hematoma to the left hand 

with significant edema to the wrist and hand.  Decubitus ulcer to the left 

lateral knee.  All wounds have surrounding erythema.


NEUROLOGICAL: Patient is awake, alert and oriented x3. Cranial nerves 2 through 

12 are grossly intact. 








Results


Results: 





 Laboratory Results











WBC  12.5 k/uL (3.8-10.6)  H  18  13:40    


 


RBC  5.37 m/uL (4.30-5.90)   18  13:40    


 


Hgb  16.3 gm/dL (13.0-17.5)   18  13:40    


 


Hct  50.2 % (39.0-53.0)   18  13:40    


 


MCV  93.5 fL (80.0-100.0)   18  13:40    


 


MCH  30.3 pg (25.0-35.0)   18  13:40    


 


MCHC  32.4 g/dL (31.0-37.0)   18  13:40    


 


RDW  13.6 % (11.5-15.5)   18  13:40    


 


Plt Count  401 k/uL (150-450)   18  13:40    


 


Neutrophils %  82 %  18  13:40    


 


Lymphocytes %  7 %  18  13:40    


 


Monocytes %  9 %  18  13:40    


 


Eosinophils %  0 %  18  13:40    


 


Basophils %  0 %  18  13:40    


 


Neutrophils #  10.2 k/uL (1.3-7.7)  H  18  13:40    


 


Lymphocytes #  0.9 k/uL (1.0-4.8)  L  18  13:40    


 


Monocytes #  1.1 k/uL (0-1.0)  H  18  13:40    


 


Eosinophils #  0.0 k/uL (0-0.7)   18  13:40    


 


Basophils #  0.0 k/uL (0-0.2)   18  13:40    


 


Sodium  142 mmol/L (137-145)   18  13:40    


 


Potassium  4.3 mmol/L (3.5-5.1)   18  13:40    


 


Chloride  111 mmol/L ()  H  18  13:40    


 


Carbon Dioxide  23 mmol/L (22-30)   18  13:40    


 


Anion Gap  8 mmol/L  18  13:40    


 


BUN  50 mg/dL (9-20)  H  18  13:40    


 


Creatinine  1.75 mg/dL (0.66-1.25)  H  18  13:40    


 


Est GFR (CKD-EPI)AfAm  41  (>60 ml/min/1.73 sqM)   18  13:40    


 


Est GFR (CKD-EPI)NonAf  36  (>60 ml/min/1.73 sqM)   18  13:40    


 


Glucose  133 mg/dL (74-99)  H  18  13:40    


 


POC Glucose (mg/dL)  135 mg/dL (75-99)  H  18  14:59    


 


POC Glu Operater ID  Arun Jones   18  14:59    


 


Plasma Lactic Acid Alberto  2.0 mmol/L (0.7-2.0)   18  13:40    


 


Calcium  9.8 mg/dL (8.4-10.2)   18  13:40    


 


Magnesium  2.4 mg/dL (1.6-2.3)  H  18  13:40    


 


Total Bilirubin  1.4 mg/dL (0.2-1.3)  H  18  13:40    


 


AST  98 U/L (17-59)  H  18  13:40    


 


ALT  51 U/L (21-72)   18  13:40    


 


Alkaline Phosphatase  71 U/L ()   18  13:40    


 


Ammonia  23 umol/L (<30)   18  13:40    


 


Total Creatine Kinase  695 U/L ()  H  18  01:42    


 


CK-MB (CK-2)  4.0 ng/mL (0.0-2.4)  H*  18  01:42    


 


CK-MB (CK-2) Rel Index  0.6   18  01:42    


 


Troponin I  0.089 ng/mL (0.000-0.034)  H*  18  01:42    


 


Total Protein  6.5 g/dL (6.3-8.2)   18  13:40    


 


Albumin  3.8 g/dL (3.5-5.0)   18  13:40    


 


Lipase  37 U/L ()   18  13:40    


 


Urine Color  Orange   18  15:10    


 


Urine Appearance  Cloudy  (Clear)   18  15:10    


 


Urine pH  5.5  (5.0-8.0)   18  15:10    


 


Ur Specific Gravity  1.020  (1.001-1.035)   18  15:10    


 


Urine Protein  1+  (Negative)  H  18  15:10    


 


Urine Glucose (UA)  Negative  (Negative)   18  15:10    


 


Urine Ketones  Negative  (Negative)   18  15:10    


 


Urine Blood  Negative  (Negative)   18  15:10    


 


Urine Nitrite  Negative  (Negative)   18  15:10    


 


Urine Bilirubin  1+  (Negative)  H  18  15:10    


 


Urine Urobilinogen  2.0 mg/dL (<2.0)   18  15:10    


 


Ur Leukocyte Esterase  Negative  (Negative)   18  15:10    


 


Urine RBC  <1 /hpf (0-5)   18  15:10    


 


Urine WBC  2 /hpf (0-5)   18  15:10    


 


Ur Squamous Epith Cells  1 /hpf (0-4)   18  15:10    


 


Hyaline Casts  35 /lpf (0-2)  H  18  15:10    


 


Urine Mucus  Moderate /hpf (None)  H  18  15:10    


 


Serum Alcohol  <10 mg/dL  18  13:40    











CBC & Chem 7: 


 18 13:40





 18 13:40


Labs: 


 Abnormal Lab Results - Last 24 Hours (Table)











  18 Range/Units





  13:40 13:40 13:40 


 


WBC    12.5 H  (3.8-10.6)  k/uL


 


Neutrophils #    10.2 H  (1.3-7.7)  k/uL


 


Lymphocytes #    0.9 L  (1.0-4.8)  k/uL


 


Monocytes #    1.1 H  (0-1.0)  k/uL


 


Chloride  111 H    ()  mmol/L


 


BUN  50 H    (9-20)  mg/dL


 


Creatinine  1.75 H    (0.66-1.25)  mg/dL


 


Glucose  133 H    (74-99)  mg/dL


 


POC Glucose (mg/dL)     (75-99)  mg/dL


 


Magnesium  2.4 H    (1.6-2.3)  mg/dL


 


Total Bilirubin  1.4 H    (0.2-1.3)  mg/dL


 


AST  98 H    (17-59)  U/L


 


Total Creatine Kinase   1323 H   ()  U/L


 


CK-MB (CK-2)   7.6 H*   (0.0-2.4)  ng/mL


 


Troponin I   0.141 H*   (0.000-0.034)  ng/mL


 


Urine Protein     (Negative)  


 


Urine Bilirubin     (Negative)  


 


Hyaline Casts     (0-2)  /lpf


 


Urine Mucus     (None)  /hpf














  18 Range/Units





  14:59 15:10 18:08 


 


WBC     (3.8-10.6)  k/uL


 


Neutrophils #     (1.3-7.7)  k/uL


 


Lymphocytes #     (1.0-4.8)  k/uL


 


Monocytes #     (0-1.0)  k/uL


 


Chloride     ()  mmol/L


 


BUN     (9-20)  mg/dL


 


Creatinine     (0.66-1.25)  mg/dL


 


Glucose     (74-99)  mg/dL


 


POC Glucose (mg/dL)  135 H    (75-99)  mg/dL


 


Magnesium     (1.6-2.3)  mg/dL


 


Total Bilirubin     (0.2-1.3)  mg/dL


 


AST     (17-59)  U/L


 


Total Creatine Kinase    1170 H  ()  U/L


 


CK-MB (CK-2)    6.4 H*  (0.0-2.4)  ng/mL


 


Troponin I    0.113 H*  (0.000-0.034)  ng/mL


 


Urine Protein   1+ H   (Negative)  


 


Urine Bilirubin   1+ H   (Negative)  


 


Hyaline Casts   35 H   (0-2)  /lpf


 


Urine Mucus   Moderate H   (None)  /hpf














  18 Range/Units





  01:42 


 


WBC   (3.8-10.6)  k/uL


 


Neutrophils #   (1.3-7.7)  k/uL


 


Lymphocytes #   (1.0-4.8)  k/uL


 


Monocytes #   (0-1.0)  k/uL


 


Chloride   ()  mmol/L


 


BUN   (9-20)  mg/dL


 


Creatinine   (0.66-1.25)  mg/dL


 


Glucose   (74-99)  mg/dL


 


POC Glucose (mg/dL)   (75-99)  mg/dL


 


Magnesium   (1.6-2.3)  mg/dL


 


Total Bilirubin   (0.2-1.3)  mg/dL


 


AST   (17-59)  U/L


 


Total Creatine Kinase  695 H  ()  U/L


 


CK-MB (CK-2)  4.0 H*  (0.0-2.4)  ng/mL


 


Troponin I  0.089 H*  (0.000-0.034)  ng/mL


 


Urine Protein   (Negative)  


 


Urine Bilirubin   (Negative)  


 


Hyaline Casts   (0-2)  /lpf


 


Urine Mucus   (None)  /hpf














Assessment and Plan


Plan: 





This is an 83-year-old  male patient who had a fall at home on Monday 

and presents with rhabdomyolysis and acute kidney injury, multiple decubitus 

ulcers and dehydration.  Patient will be started on IV antibiotics in the form 

of vancomycin.  Wound care will be addressed.  Tetanus status was updated in 

the emergency center.  We'll add an x-ray of the left hand and wrist to rule 

out fracture.  Patient is currently on [facility] protocol.  Monitor for DTs.  For 

his severe protein calorie malnutrition, multivitamin and ensure added.  

Nicotine patch will be ordered.  Continue supportive care.  Further 

recommendations as patient progresses.





The above dictated assessment and findings were discussed with Dr. Cuevas.  The 

impression and plan of care have been directed as dictated.  Barbra Bernstein nurse 

practitioner acting as scribe for Dr. Cuevas.

## 2018-08-09 NOTE — XR
Left wrist and left hand

 

HISTORY: Trauma and pain

 

4 views of the left wrist, 3 views of the left hand submitted

 

There is soft tissue swelling present. Small ossific density present just volar to be distal metaphys
eal radius measuring only 1 to 2 mm at the likely to be acute. Alignment, joint spaces maintained. No
 evident acute fracture or dislocation. Bone mineralization is reduced which may limit sensitivity.

 

IMPRESSION: No definite fracture or dislocation. Follow-up as indicated.

## 2018-08-10 NOTE — PN
PROGRESS NOTE



DATE OF SERVICE:

8/10/18



PRESENTING COMPLAINT:

Fall.



INTERVAL HISTORY:

This is a patient was found on the floor for close to 2 days and actually had passed

out.  The patient presented with acute renal failure, acute rhabdomyolysis, multiple

skin bruising.  The patient does drink alcohol and smokes cigarettes.  A bit more awake

today, though weak and tired.  Wound care has been taken care of.



REVIEW OF SYSTEMS:

Done for constitutional, cardiovascular, GI, pulmonary; relevant findings as above.



CURRENT MEDICATIONS:

Reviewed that include IV daptomycin.



PHYSICAL EXAMINATION:

Temperature 98, pulse 79, respiration 17, blood pressure 130/63, pulse ox 94% on room

air.

GENERAL APPEARANCE:  Lying in bed, awake.

EYES: Pupils equal. Conjunctivae normal.

HEENT: External appearance of nose and ears normal. Oral cavity normal.

NECK: JVD unable to assess.  Mass not palpable.

RESPIRATORY: Effort increased. Lungs, decreased breath sounds, wheezing.

CARDIOVASCULAR: First and second sounds, no edema.

ABDOMEN:  Soft, nontender.  Liver and spleen not palpable.

PSYCHIATRY:  Awake, answering simple questions.

DERMATOLOGICAL: Areas of multiple scalp breakdown especially on the left side.



INVESTIGATIONS:

Potassium 3.7, creatinine 0.76, troponin 0.113 and 0.089.



ASSESSMENT:

1. Patient was unconscious, cause unclear. At this point, could be from hypotension.

2. Multiple skin bruising, especially on the left side from area of contact on the

    floor with secondary infection on IV daptomycin.

3. Chronic alcohol dependence.

4. Chronic nicotine dependence.

5. Chronic obstructive pulmonary disease in a current smoker.

6. Depression, not otherwise specified.

7. 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

8. Acute renal and prerenal, could be acute tubular necrosis with biochemical

    improvement.

9. Acute rhabdomyolysis secondary to fall.

10.Medical debility.



PLAN:

Continue current medication and treatment plan. Supportive care.  Care was discussed

with the patient. Follow.





MMODL / IJN: [id number] / Job#: [id number]

## 2018-08-10 NOTE — NM
EXAMINATION TYPE: NM bone 3 phase

 

DATE OF EXAM: 8/10/2018

 

COMPARISON: Plain film 8/9/2018

 

HISTORY: Nonhealing wounds, patient down for 3 days, osteoarthritis, osteomyelitis of the left wrist,
 pelvis, left leg

 

Triple phase bone scintigraphy was performed following the injection of 26.9 mCi Tc 99m MDP.  Immedia
te images and 5.5 hours post injection images acquired.

 

FINDINGS: 

 

Blood flow and blood pool activity noted over the pelvis, blood pool activity also performed over the
 knee, delayed imaging over the pelvis, knees and left hand and wrist.

 

Increased blood flow and blood pool activity noted in the soft tissues overlying the left hip with so
me central decreased reaffirms of uptake. Patient shows a photopenic area at the level of the left hi
p compatible with postop hip arthroplasty. No abnormal uptake to suggest osteomyelitis of the left hi
p or knee, knee uptake is symmetric.

 

There is increased uptake noted at the level of the left wrist on delayed imaging as well as the leve
l of the distal forearm.

 

Some increased uptake noted within the right renal collecting system may be due to some hydronephrosi
s. Bandlike area of increased pharmaceutical uptake in the lumbar spine be due to osteoporotic compre
ssion fracture of indeterminate age. There is some uptake noted along the lateral left RIBS possibly 
posttraumatic.

 

IMPRESSION: 

 

Findings in the distal forearm and wrist could be posttraumatic. Infection is not excluded, consider 
MRI as indicated, exam not tailored ideally for evaluation of osteomyelitis within the left hand and 
wrist due to multiple sites evaluated. Additional possible posttraumatic changes above.

## 2018-08-10 NOTE — P.PN
Subjective


Progress Note Date: 08/10/18





This is an 83-year-old  male patient who currently lives alone.  He 

apparently fell on Monday and was laying on the floor until yesterday.  He 

states that he was feeling dizzy and fell.  He drank 3 beers that day but 

according to his daughter he states he drinks up to a 12 pack a day and has 

been doing this for many years.  Patient states that he was laying on his left 

side and his left hand and arm were underneath him and he was unable to move it 

out.  Patient was transported by EMS to [facility] emergency 

center for evaluation.  His ammonia level was 23, blood sugar in the 130s total 

bilirubin 1.4, AST 90, applying phosphatase and ALT within normal limits, CK 

was 1323.  BUN 15 creatinine 1.75 white count was at 12.  Troponin 0.141, 0.113

, 0.089.  Alcohol level was less than 10.  Urinalysis was orange with 1+ 

bilirubin.  Chest x-ray shows no acute abnormality but multiple old fractures.  

CAT scan of the head and cervical spine showed no acute fracture or dislocation 

of the cervical spine.  No acute intracranial hemorrhage or midline shift.  

Moderate diffuse cerebral atrophy and advanced chronic small vessel ischemic 

change.  New right parietal scalp hematoma.  Patient has been afebrile since 

admission.  He is not on antibiotics.  His tetanus status was updated in the 

emergency center.  Patient presents with multiple abrasions and wounds to his 

left temple and cheek, left hand and wrist dorsal and palmar surfaces, multiple 

areas on his left chest and abdomen as well as left lower extremity.  According 

to the patient's daughter patient has frequent falls although he will deny at.  

I discussed discharge planning with the patient and he is open to going to 

[facility] because he was there about 2 years ago after a hip 

fracture.  Daughter is DURABLE POWER OF  and family are hoping that 

they can make arrangements so he does not return home.  He has received 3 L of 

IV fluid, thiamine and CIWA protocol in place.  He does state that he has not 

been eating much due to lack of appetite and has lost 15 lbs over the past 

couple months.  He denies any nausea, vomiting, diarrhea.  He denies chest pain

, shortness of breath.  He has a chronic productive cough.





08/10/2018 patient is feeling about the same as yesterday.  Wonders if he gets 

to go home later today or tomorrow.  We will redirect him that the plan at this 

point in time we'll refer him to go to [facility] once he is stable which will 

likely be early next week.  He certainly is comfortable but has significant 

weakness.  His multiple wounds are less painful





Objective





- Vital Signs


Vital signs: 


 Vital Signs











Temp  98.1 F   08/10/18 16:00


 


Pulse  87   08/10/18 16:00


 


Resp  18   08/10/18 16:00


 


BP  131/82   08/10/18 16:00


 


Pulse Ox  94 L  08/10/18 16:00








 Intake & Output











 08/10/18 08/10/18 08/11/18





 06:59 18:59 06:59


 


Intake Total 1340 1060 


 


Output Total 300 500 


 


Balance 1040 560 


 


Weight 61.5 kg  


 


Intake:   


 


  Intake, IV Titration 1100 700 





  Amount   


 


    Lactated Ringers 1,000 ml 1100 700 





    @ 100 mls/hr IV .Q10H   





    KRISHNA Rx#:[id number]   


 


  Oral 240 360 


 


Output:   


 


  Urine 300 500 


 


Other:   


 


  Voiding Method Urinal  





 Diaper  





 Incontinent  


 


  # Voids 4  














- Exam





Gen: This is an 83-year-old.  Patient is in bed and appears to be comfortable 

and in no acute distress.  Patient does have pain with minimal movement 

secondary to significant wounds.


HEENT: Head is normocephalic.  Ulcer to the left temple and left cheek with 

serous drainage.  Pupils equal, round. Sclerae is anicteric.  Conjunctiva 

somewhat pale.  Mucous members of the mouth are moist.  Dentures in place on 

the upper.  No lesions noted.


NECK: Supple. No JVD. No lymphadenopathy. No thyromegaly. 


LUNGS: Clear to auscultation. No wheezes or rhonchi.  No intercostal 

retractions.


HEART: Regular rate and rhythm. No murmur. 


ABDOMEN: Soft. Bowel sounds are present. No masses.  No tenderness.


EXTREMITIES: No pedal edema.  No calf tenderness.  Dorsalis pedis +1 

bilaterally.  Onychomycosis noted bilaterally.


SKIN: There are multiple decubitus ulcers on the left chest wall and left 

abdomen with surrounding erythema and edema.  Large hematoma to the left hand 

with significant edema to the wrist and hand.  Decubitus ulcer to the left 

lateral knee.  All wounds have surrounding erythema.  None of them have 

significant drainage at this time.


NEUROLOGICAL: Patient is awake, alert and oriented x2





- Labs


CBC & Chem 7: 


 08/08/18 13:40





 08/10/18 05:59


Labs: 


 Abnormal Lab Results - Last 24 Hours (Table)











  08/10/18 Range/Units





  05:59 


 


Sodium  136 L  (137-145)  mmol/L








 Microbiology - Last 24 Hours (Table)











 08/09/18 11:20 Gram Stain - Preliminary





 Hip - Left Wound Culture - Preliminary








 Laboratory Results











WBC  12.5 k/uL (3.8-10.6)  H  08/08/18  13:40    


 


RBC  5.37 m/uL (4.30-5.90)   08/08/18  13:40    


 


Hgb  16.3 gm/dL (13.0-17.5)   08/08/18  13:40    


 


Hct  50.2 % (39.0-53.0)   08/08/18  13:40    


 


MCV  93.5 fL (80.0-100.0)   08/08/18  13:40    


 


MCH  30.3 pg (25.0-35.0)   08/08/18  13:40    


 


MCHC  32.4 g/dL (31.0-37.0)   08/08/18  13:40    


 


RDW  13.6 % (11.5-15.5)   08/08/18  13:40    


 


Plt Count  401 k/uL (150-450)   08/08/18  13:40    


 


Neutrophils %  82 %  08/08/18  13:40    


 


Lymphocytes %  7 %  08/08/18  13:40    


 


Monocytes %  9 %  08/08/18  13:40    


 


Eosinophils %  0 %  08/08/18  13:40    


 


Basophils %  0 %  08/08/18  13:40    


 


Neutrophils #  10.2 k/uL (1.3-7.7)  H  08/08/18  13:40    


 


Lymphocytes #  0.9 k/uL (1.0-4.8)  L  08/08/18  13:40    


 


Monocytes #  1.1 k/uL (0-1.0)  H  08/08/18  13:40    


 


Eosinophils #  0.0 k/uL (0-0.7)   08/08/18  13:40    


 


Basophils #  0.0 k/uL (0-0.2)   08/08/18  13:40    


 


Sodium  136 mmol/L (137-145)  L  08/10/18  05:59    


 


Potassium  3.7 mmol/L (3.5-5.1)   08/10/18  05:59    


 


Chloride  105 mmol/L ()   08/10/18  05:59    


 


Carbon Dioxide  27 mmol/L (22-30)   08/10/18  05:59    


 


Anion Gap  4 mmol/L  08/10/18  05:59    


 


BUN  20 mg/dL (9-20)   08/10/18  05:59    


 


Creatinine  0.76 mg/dL (0.66-1.25)   08/10/18  05:59    


 


Est GFR (CKD-EPI)AfAm  >90  (>60 ml/min/1.73 sqM)   08/10/18  05:59    


 


Est GFR (CKD-EPI)NonAf  86  (>60 ml/min/1.73 sqM)   08/10/18  05:59    


 


Glucose  80 mg/dL (74-99)   08/10/18  05:59    


 


POC Glucose (mg/dL)  135 mg/dL (75-99)  H  08/08/18  14:59    


 


POC Glu Operater ID  Arun Jones   08/08/18  14:59    


 


Plasma Lactic Acid Alberto  2.0 mmol/L (0.7-2.0)   08/08/18  13:40    


 


Calcium  8.8 mg/dL (8.4-10.2)   08/10/18  05:59    


 


Magnesium  2.4 mg/dL (1.6-2.3)  H  08/08/18  13:40    


 


Total Bilirubin  1.4 mg/dL (0.2-1.3)  H  08/08/18  13:40    


 


AST  98 U/L (17-59)  H  08/08/18  13:40    


 


ALT  51 U/L (21-72)   08/08/18  13:40    


 


Alkaline Phosphatase  71 U/L ()   08/08/18  13:40    


 


Ammonia  23 umol/L (<30)   08/08/18  13:40    


 


Total Creatine Kinase  695 U/L ()  H  08/09/18  01:42    


 


CK-MB (CK-2)  4.0 ng/mL (0.0-2.4)  H*  08/09/18  01:42    


 


CK-MB (CK-2) Rel Index  0.6   08/09/18  01:42    


 


Troponin I  0.089 ng/mL (0.000-0.034)  H*  08/09/18  01:42    


 


Total Protein  6.5 g/dL (6.3-8.2)   08/08/18  13:40    


 


Albumin  3.8 g/dL (3.5-5.0)   08/08/18  13:40    


 


Lipase  37 U/L ()   08/08/18  13:40    


 


Urine Color  Orange   08/08/18  15:10    


 


Urine Appearance  Cloudy  (Clear)   08/08/18  15:10    


 


Urine pH  5.5  (5.0-8.0)   08/08/18  15:10    


 


Ur Specific Gravity  1.020  (1.001-1.035)   08/08/18  15:10    


 


Urine Protein  1+  (Negative)  H  08/08/18  15:10    


 


Urine Glucose (UA)  Negative  (Negative)   08/08/18  15:10    


 


Urine Ketones  Negative  (Negative)   08/08/18  15:10    


 


Urine Blood  Negative  (Negative)   08/08/18  15:10    


 


Urine Nitrite  Negative  (Negative)   08/08/18  15:10    


 


Urine Bilirubin  1+  (Negative)  H  08/08/18  15:10    


 


Urine Urobilinogen  2.0 mg/dL (<2.0)   08/08/18  15:10    


 


Ur Leukocyte Esterase  Negative  (Negative)   08/08/18  15:10    


 


Urine RBC  <1 /hpf (0-5)   08/08/18  15:10    


 


Urine WBC  2 /hpf (0-5)   08/08/18  15:10    


 


Ur Squamous Epith Cells  1 /hpf (0-4)   08/08/18  15:10    


 


Hyaline Casts  35 /lpf (0-2)  H  08/08/18  15:10    


 


Urine Mucus  Moderate /hpf (None)  H  08/08/18  15:10    


 


Serum Alcohol  <10 mg/dL  08/08/18  13:40    








 Microbiology





08/09/18 11:20   Hip - Left   Gram Stain - Preliminary


08/09/18 11:20   Hip - Left   Wound Culture - Preliminary











Assessment and Plan


(1) Renal insufficiency


Current Visit: Yes   Status: Acute   Code(s): N28.9 - DISORDER OF KIDNEY AND 

URETER, UNSPECIFIED   SNOMED Code(s): 342262930


   





(2) Fall


Narrative/Plan: 


the patient's worsening status and concerns to her ability to care for himself.

  The patient is evidence of the multiple wounds that developed from his fall 

and being unable to buy his own power get up off the floor.  We'll place 

mupirocin over the wounds to the face as well as to the chest wall and abdomen 

area.  To the left hand and palmar surface Silvadene will be applied will also 

be applied to the ulceration to the left leg and wrapped and place nonstick 

with gauze.


Antibiotic therapy originally was considered with vancomycin however with his 

elevated creatinine his changed to daptomycin while cultures are pending.  We'

ll obtain a whole-body bone scan to evaluate the areas of pressure to determine 

if there is potential underlying bony infection.


Patient seems to responding well to antibiotic therapy at this time.  He is 

somewhat comfortable.  Waiting to determine what her overall actions will be 

once cultures are been finalized.  Continue local wound care at this point in 

time with the mupirocin to the injuries and ulceration with eschar to the face 

and chest wall.  To the eschars to the left lateral thigh lower left leg and 

right knee area Silvadene is applied to these areas.  Always presser 

ulcerations are with eschar and are unstageable at this time.  They were all 

present on admission.  Bone scan is pending to determine if there is any deeper 

infection at this time.


Current Visit: Yes   Status: Acute   Code(s): W19.XXXA - UNSPECIFIED FALL, 

INITIAL ENCOUNTER   SNOMED Code(s): 4415282

## 2018-08-11 NOTE — P.PN
Subjective


Progress Note Date: 08/11/18


Principal diagnosis: 


Fall


Multiple decubitus ulcers on left side


Rhabdomyolysis


Acute renal injury


Chronic alcohol use





This is a 83-year-old male history of CVA, alcoholism who apparently became 

weak and fell on the floor where he remained for 2 days since he was not able 

to get up.  He was found lying on the floor on his left side.  Is brought in by 

EMS for evaluation.  He is found to have multiple pressure sores 





08/11/2018


Patient is seen and evaluated in the room at bedside; denies any new complaints 

of pain or shortness of breath; patient questions if he is going to be 

discharged home; I did discuss possible discharge to skilled rehab as planned 

with patient and family; we did discuss bone scan results which show some 

changes and distal forearm and wrist possibly posttraumatic versus infection; 

MRI has been recommended; infection diseases following and we await further 

recommendations from their service





Objective





- Vital Signs


Vital signs: 


 Vital Signs











Temp  97.4 F L  08/11/18 08:00


 


Pulse  97   08/11/18 08:00


 


Resp  18   08/11/18 08:00


 


BP  152/77   08/11/18 08:00


 


Pulse Ox  94 L  08/11/18 08:00








 Intake & Output











 08/10/18 08/11/18 08/11/18





 18:59 06:59 18:59


 


Intake Total 1060  240


 


Output Total 500 400 


 


Balance 560 -400 240


 


Weight  65 kg 


 


Intake:   


 


  Intake, IV Titration 700  





  Amount   


 


    Lactated Ringers 1,000 ml 700  





    @ 100 mls/hr IV .Q10H   





    KRISHNA Rx#:[id number]   


 


  Oral 360  240


 


Output:   


 


  Urine 500 400 


 


Other:   


 


  Voiding Method  Urinal Urinal





  Diaper Diaper





  Incontinent Incontinent


 


  # Voids  2 














- Exam


- Constitutional


General appearance: Present: average body habitus, cooperative, no acute 

distress


- EENT


Eyes: Present: anicteric sclerae, EOMI, PERRLA, normal appearance


ENT: Present: hearing grossly normal, normal oropharynx


Ears: bilateral: normal


- Neck


Neck: Present: normal ROM.  Absent: lymphadenopathy, rigidity, thyromegaly


Carotids: negative: bruit present


Thyroid: bilateral: normal size, negative: enlarged, nodule


- Respiratory


Respiratory: bilateral: CTA, negative: rales, rhonchi, wheezing


- Cardiovascular


Rhythm: regular


Heart sounds: normal: S1, S2


Abnormal Heart Sounds: Absent: systolic murmur, diastolic murmur


- Gastrointestinal


General gastrointestinal: Present: normal bowel sounds, soft.  Absent: distended

, organomegaly, tenderness


- Genitourinary


Genitourinary Comment(s): deferred


- Integumentary


Integumentary: There are multiple decubitus ulcers on the left chest wall and 

left abdomen with surrounding erythema and edema.  Large hematoma to the left 

hand with significant edema to the wrist and hand.  Decubitus ulcer to the left 

lateral knee.  All wounds have surrounding erythema.


- Neurologic


Neurologic: Present: CNII-XII intact.  Absent: focal deficits


- Musculoskeletal


Musculoskeletal: Present: gait normal, strength equal bilaterally


- Psychiatric


Psychiatric: Present: A&O x's 3, appropriate affect, intact judgment & insight











- Labs


CBC & Chem 7: 


 08/08/18 13:40





 08/11/18 06:12


Labs: 


 Abnormal Lab Results - Last 24 Hours (Table)











  08/11/18 Range/Units





  06:12 


 


Sodium  135 L  (137-145)  mmol/L


 


Potassium  3.1 L  (3.5-5.1)  mmol/L














Assessment and Plan


Assessment: 


1.  Rhabdomyolysis


- Clinically improving; we will continue with IV fluid hydration lactated 

Ringer at 100 mL an hour


- Monitor total CK periodically





2.  Acute renal failure


- Resolved with IV fluid hydration off lactated Ringer at 100 mL an hour


- We will continue to monitor strict MOISES's, renal function and electrolytes


- We will avoid hypotension and nephrotoxins





3.  Multiple infected decubitus ulcers/ cellulitis


- ID is following


- Patient remains on IV daptomycin 500 mg every 24 hours as recommended by ID


- Local wound care as recommended by ID





4.  Hypertension; stable without any medications; we will continue to monitor





5.  Hyperlipidemia; continue with home dose of fenofibrate





6.  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





7.  Chronic alcohol abuse with impending DTs


- Patient remains on CIWA protocol


- Counseling done and cessation of alcohol use





8.  Chronic tobacco abuse; we will continue with nicotine patch as prescribed





9.  DVT prophylaxis; subcu heparin





CODE STATUS; full code








Time with Patient: Greater than 30

## 2018-08-12 NOTE — P.PN
Subjective


Progress Note Date: 08/12/18


Principal diagnosis: 


Fall


Multiple decubitus ulcers on left side


Rhabdomyolysis


Acute renal injury


Chronic alcohol use





This is a 83-year-old male history of CVA, alcoholism who apparently became 

weak and fell on the floor where he remained for 2 days since he was not able 

to get up.  He was found lying on the floor on his left side.  Is brought in by 

EMS for evaluation.  He is found to have multiple pressure sores 





08/11/2018


Patient is seen and evaluated in the room at bedside; denies any new complaints 

of pain or shortness of breath; patient questions if he is going to be 

discharged home; I did discuss possible discharge to skilled rehab as planned 

with patient and family; we did discuss bone scan results which show some 

changes and distal forearm and wrist possibly posttraumatic versus infection; 

MRI has been recommended; infection diseases following and we await further 

recommendations from their service





08/12/2018


Patient is seen and evaluated in the room with family members at bedside; 

family requesting transfer of patient to [facility] for skilled rehab; we did 

discuss treatment plan and need for clearance for discharge by infection 

disease with switch to oral antibiotics versus IV antibiotic treatment prior to 

discharge to skilled rehab, patient remains on IV daptomycin 500 mg every 24 

hours; family understands and is agreeable; patient's labs remained stable with 

a white blood count within normal limit and CK down to 201 from 695; we will 

plan to decrease IV fluids; shouldn't is advised to increase oral fluid intake; 

we'll continue to monitor electrolytes, MOISES's and renal function.  Possible 

discharge to skilled rehab in next 24-48 hours pending clearance from ID





Objective





- Vital Signs


Vital signs: 


 Vital Signs











Temp  98.0 F   08/12/18 08:00


 


Pulse  71   08/12/18 08:00


 


Resp  18   08/12/18 08:00


 


BP  169/97   08/12/18 08:00


 


Pulse Ox  98   08/12/18 08:00








 Intake & Output











 08/11/18 08/12/18 08/12/18





 18:59 06:59 18:59


 


Intake Total 840 800 240


 


Output Total 1350 1100 150


 


Balance -510 -300 90


 


Weight  66 kg 


 


Intake:   


 


  Intake, IV Titration  800 





  Amount   


 


    Lactated Ringers 1,000 ml  800 





    @ 100 mls/hr IV .Q10H   





    KRISHNA Rx#:[id number]   


 


  Oral 840  240


 


Output:   


 


  Urine 1350 1100 150


 


Other:   


 


  Voiding Method Urinal Urinal Urinal





 Diaper Diaper Diaper





 Incontinent Incontinent Incontinent


 


  # Voids  1 


 


  # Bowel Movements  1 














- Exam


- Constitutional


General appearance: Present: average body habitus, cooperative, no acute 

distress


- EENT


Eyes: Present: anicteric sclerae, EOMI, PERRLA, normal appearance


ENT: Present: hearing grossly normal, normal oropharynx


Ears: bilateral: normal


- Neck


Neck: Present: normal ROM.  Absent: lymphadenopathy, rigidity, thyromegaly


Carotids: negative: bruit present


Thyroid: bilateral: normal size, negative: enlarged, nodule


- Respiratory


Respiratory: bilateral: CTA, negative: rales, rhonchi, wheezing


- Cardiovascular


Rhythm: regular


Heart sounds: normal: S1, S2


Abnormal Heart Sounds: Absent: systolic murmur, diastolic murmur


- Gastrointestinal


General gastrointestinal: Present: normal bowel sounds, soft.  Absent: distended

, organomegaly, tenderness


- Genitourinary


Genitourinary Comment(s): deferred


- Integumentary


Integumentary: There are multiple decubitus ulcers on the left chest wall and 

left abdomen with surrounding erythema and edema.  Large hematoma to the left 

hand with significant edema to the wrist and hand.  Decubitus ulcer to the left 

lateral knee.  All wounds have surrounding erythema.


- Neurologic


Neurologic: Present: CNII-XII intact.  Absent: focal deficits


- Musculoskeletal


Musculoskeletal: Present: gait normal, strength equal bilaterally


- Psychiatric


Psychiatric: Present: A&O x's 3, appropriate affect, intact judgment & insight











- Labs


CBC & Chem 7: 


 08/12/18 06:21





 08/12/18 06:21


Labs: 


 Abnormal Lab Results - Last 24 Hours (Table)











  08/11/18 08/12/18 08/12/18 Range/Units





  16:17 06:21 06:21 


 


Lymphocytes #    0.7 L  (1.0-4.8)  k/uL


 


POC Glucose (mg/dL)  135 H    (75-99)  mg/dL


 


Creatine Kinase   201 H   ()  U/L








 Microbiology - Last 24 Hours (Table)











 08/09/18 11:20 Gram Stain - Final





 Hip - Left Wound Culture - Final














Assessment and Plan


Assessment: 


1.  Rhabdomyolysis


- Clinically improving; we will continue with IV fluid hydration lactated 

Ringer at 100 mL an hour


- Monitor total CK periodically; slowly trending down





2.  Acute renal failure


- Resolved with IV fluid hydration off lactated Ringer at 100 mL an hour; we 

will decrease IV fluids to 50 mL an hour


- We will continue to monitor strict MOISES's, renal function and electrolytes


- We will avoid hypotension and nephrotoxins





3.  Multiple infected decubitus ulcers/ cellulitis


- ID is following


- Patient remains on IV daptomycin 500 mg every 24 hours as recommended by ID


- Local wound care as recommended by ID





4.  Hypertension; stable without any medications; we will continue to monitor





5.  Hyperlipidemia; continue with home dose of fenofibrate





6.  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





7.  Chronic alcohol abuse with impending DTs


- Patient remains on CIWA protocol


- Counseling done and cessation of alcohol use





8.  Chronic tobacco abuse; we will continue with nicotine patch as prescribed





9.  DVT prophylaxis; subcu heparin





CODE STATUS; full code





Disposition; patient remains clinically stable; possible discharge to skilled 

nursing facility in next 24-48 hours pending clearance from ID








Time with Patient: Greater than 30

## 2018-08-13 NOTE — P.PN
Subjective


Progress Note Date: 08/13/18





This is an 83-year-old  male patient who currently lives alone.  He 

apparently fell on Monday and was laying on the floor until yesterday.  He 

states that he was feeling dizzy and fell.  He drank 3 beers that day but 

according to his daughter he states he drinks up to a 12 pack a day and has 

been doing this for many years.  Patient states that he was laying on his left 

side and his left hand and arm were underneath him and he was unable to move it 

out.  Patient was transported by EMS to [facility] emergency 

center for evaluation.  His ammonia level was 23, blood sugar in the 130s total 

bilirubin 1.4, AST 90, applying phosphatase and ALT within normal limits, CK 

was 1323.  BUN 15 creatinine 1.75 white count was at 12.  Troponin 0.141, 0.113

, 0.089.  Alcohol level was less than 10.  Urinalysis was orange with 1+ 

bilirubin.  Chest x-ray shows no acute abnormality but multiple old fractures.  

CAT scan of the head and cervical spine showed no acute fracture or dislocation 

of the cervical spine.  No acute intracranial hemorrhage or midline shift.  

Moderate diffuse cerebral atrophy and advanced chronic small vessel ischemic 

change.  New right parietal scalp hematoma.  Patient has been afebrile since 

admission.  He is not on antibiotics.  His tetanus status was updated in the 

emergency center.  Patient presents with multiple abrasions and wounds to his 

left temple and cheek, left hand and wrist dorsal and palmar surfaces, multiple 

areas on his left chest and abdomen as well as left lower extremity.  According 

to the patient's daughter patient has frequent falls although he will deny at.  

I discussed discharge planning with the patient and he is open to going to 

[facility] because he was there about 2 years ago after a hip 

fracture.  Daughter is DURABLE POWER OF  and family are hoping that 

they can make arrangements so he does not return home.  He has received 3 L of 

IV fluid, thiamine and CIWA protocol in place.  He does state that he has not 

been eating much due to lack of appetite and has lost 15 lbs over the past 

couple months.  He denies any nausea, vomiting, diarrhea.  He denies chest pain

, shortness of breath.  He has a chronic productive cough.





08/10/2018 patient is feeling about the same as yesterday.  Wonders if he gets 

to go home later today or tomorrow.  We will redirect him that the plan at this 

point in time we'll refer him to go to [facility] once he is stable which will 

likely be early next week.  He certainly is comfortable but has significant 

weakness.  His multiple wounds are less painful


08/13/2018 reveals that the patient is having some improvement.  He is much 

less uncomfortable.  Is having difficulty with his dinner, the observer 

consequently cuts up his food to help eat his dinner.  He is denying other 

acute symptoms his pain is improving looks forward to going to the rehab center.





Objective





- Vital Signs


Vital signs: 


 Vital Signs











Temp  98.5 F   08/13/18 21:58


 


Pulse  88   08/13/18 21:58


 


Resp  16   08/13/18 21:58


 


BP  148/77   08/13/18 21:58


 


Pulse Ox  92 L  08/13/18 21:58








 Intake & Output











 08/13/18 08/13/18 08/14/18





 06:59 18:59 06:59


 


Intake Total 590 50 


 


Output Total 200 525 


 


Balance 390 -475 


 


Weight 66 kg 66 kg 


 


Intake:   


 


  Intake, IV Titration  50 





  Amount   


 


    DAPTOmycin 500 mg In  50 





    Sodium Chloride 0.9% 50   





    ml @ 100 mls/hr IVPB Q24H   





    [facility] Rx#:[id number]   


 


  Oral 590  


 


Output:   


 


  Urine 200 525 


 


Other:   


 


  Voiding Method Urinal  Urinal





 Incontinent  Incontinent


 


  # Voids 1 1 














- Exam





Gen: This is an 83-year-old.  Patient is in bed and appears to be comfortable 

and in no acute distress.  Patient does have pain with minimal movement 

secondary to significant wounds.


HEENT: Head is normocephalic.  Ulcer to the left temple and left cheek with 

serous drainage.  Pupils equal, round. Sclerae is anicteric.  Conjunctiva 

somewhat pale.  Mucous members of the mouth are moist.  Dentures in place on 

the upper.  No lesions noted.


NECK: Supple. No JVD. No lymphadenopathy. No thyromegaly. 


LUNGS: Clear to auscultation. No wheezes or rhonchi.  No intercostal 

retractions.


HEART: Regular rate and rhythm. No murmur. 


ABDOMEN: Soft. Bowel sounds are present. No masses.  No tenderness.


EXTREMITIES: No pedal edema.  No calf tenderness.  Dorsalis pedis +1 

bilaterally.  Onychomycosis noted bilaterally.


SKIN: There are multiple decubitus ulcers on the left chest wall and left 

abdomen with surrounding erythema and edema.  Large hematoma to the left hand 

with significant edema to the wrist and hand.  Decubitus ulcer to the left 

lateral knee.  All wounds have surrounding erythema.  None of them have 

significant drainage at this time.


NEUROLOGICAL: Patient is awake, alert and oriented x2





- Labs


CBC & Chem 7: 


 08/12/18 06:21





 08/13/18 07:38


Labs: 


 Abnormal Lab Results - Last 24 Hours (Table)











  08/13/18 Range/Units





  07:38 


 


Sodium  136 L  (137-145)  mmol/L


 


Creatinine  0.54 L  (0.66-1.25)  mg/dL








 Laboratory Results











WBC  8.7 k/uL (3.8-10.6)   08/12/18  06:21    


 


RBC  4.53 m/uL (4.30-5.90)   08/12/18  06:21    


 


Hgb  14.1 gm/dL (13.0-17.5)   08/12/18  06:21    


 


Hct  43.3 % (39.0-53.0)   08/12/18  06:21    


 


MCV  95.7 fL (80.0-100.0)   08/12/18  06:21    


 


MCH  31.1 pg (25.0-35.0)   08/12/18  06:21    


 


MCHC  32.5 g/dL (31.0-37.0)   08/12/18  06:21    


 


RDW  13.3 % (11.5-15.5)   08/12/18  06:21    


 


Plt Count  303 k/uL (150-450)   08/12/18  06:21    


 


Neutrophils %  82 %  08/12/18  06:21    


 


Lymphocytes %  8 %  08/12/18  06:21    


 


Monocytes %  7 %  08/12/18  06:21    


 


Eosinophils %  2 %  08/12/18  06:21    


 


Basophils %  0 %  08/12/18  06:21    


 


Neutrophils #  7.1 k/uL (1.3-7.7)   08/12/18  06:21    


 


Lymphocytes #  0.7 k/uL (1.0-4.8)  L  08/12/18  06:21    


 


Monocytes #  0.6 k/uL (0-1.0)   08/12/18  06:21    


 


Eosinophils #  0.1 k/uL (0-0.7)   08/12/18  06:21    


 


Basophils #  0.0 k/uL (0-0.2)   08/12/18  06:21    


 


Sodium  136 mmol/L (137-145)  L  08/13/18  07:38    


 


Potassium  4.5 mmol/L (3.5-5.1)   08/13/18  07:38    


 


Chloride  105 mmol/L ()   08/13/18  07:38    


 


Carbon Dioxide  25 mmol/L (22-30)   08/13/18  07:38    


 


Anion Gap  6 mmol/L  08/13/18  07:38    


 


BUN  12 mg/dL (9-20)   08/13/18  07:38    


 


Creatinine  0.54 mg/dL (0.66-1.25)  L  08/13/18  07:38    


 


Est GFR (CKD-EPI)AfAm  >90  (>60 ml/min/1.73 sqM)   08/13/18  07:38    


 


Est GFR (CKD-EPI)NonAf  >90  (>60 ml/min/1.73 sqM)   08/13/18  07:38    


 


Glucose  90 mg/dL (74-99)   08/13/18  07:38    


 


POC Glucose (mg/dL)  135 mg/dL (75-99)  H  08/11/18  16:17    


 


POC Glu Operater ID  Oliva Medeiros   08/11/18  16:17    


 


Plasma Lactic Acid Alberto  2.0 mmol/L (0.7-2.0)   08/08/18  13:40    


 


Calcium  9.0 mg/dL (8.4-10.2)   08/13/18  07:38    


 


Magnesium  2.4 mg/dL (1.6-2.3)  H  08/08/18  13:40    


 


Total Bilirubin  1.4 mg/dL (0.2-1.3)  H  08/08/18  13:40    


 


AST  98 U/L (17-59)  H  08/08/18  13:40    


 


ALT  51 U/L (21-72)   08/08/18  13:40    


 


Alkaline Phosphatase  71 U/L ()   08/08/18  13:40    


 


Ammonia  23 umol/L (<30)   08/08/18  13:40    


 


Creatine Kinase  201 U/L ()  H  08/12/18  06:21    


 


Total Creatine Kinase  695 U/L ()  H  08/09/18  01:42    


 


CK-MB (CK-2)  4.0 ng/mL (0.0-2.4)  H*  08/09/18  01:42    


 


CK-MB (CK-2) Rel Index  0.6   08/09/18  01:42    


 


Troponin I  0.089 ng/mL (0.000-0.034)  H*  08/09/18  01:42    


 


Total Protein  6.5 g/dL (6.3-8.2)   08/08/18  13:40    


 


Albumin  3.8 g/dL (3.5-5.0)   08/08/18  13:40    


 


Lipase  37 U/L ()   08/08/18  13:40    


 


Urine Color  Orange   08/08/18  15:10    


 


Urine Appearance  Cloudy  (Clear)   08/08/18  15:10    


 


Urine pH  5.5  (5.0-8.0)   08/08/18  15:10    


 


Ur Specific Gravity  1.020  (1.001-1.035)   08/08/18  15:10    


 


Urine Protein  1+  (Negative)  H  08/08/18  15:10    


 


Urine Glucose (UA)  Negative  (Negative)   08/08/18  15:10    


 


Urine Ketones  Negative  (Negative)   08/08/18  15:10    


 


Urine Blood  Negative  (Negative)   08/08/18  15:10    


 


Urine Nitrite  Negative  (Negative)   08/08/18  15:10    


 


Urine Bilirubin  1+  (Negative)  H  08/08/18  15:10    


 


Urine Urobilinogen  2.0 mg/dL (<2.0)   08/08/18  15:10    


 


Ur Leukocyte Esterase  Negative  (Negative)   08/08/18  15:10    


 


Urine RBC  <1 /hpf (0-5)   08/08/18  15:10    


 


Urine WBC  2 /hpf (0-5)   08/08/18  15:10    


 


Ur Squamous Epith Cells  1 /hpf (0-4)   08/08/18  15:10    


 


Hyaline Casts  35 /lpf (0-2)  H  08/08/18  15:10    


 


Urine Mucus  Moderate /hpf (None)  H  08/08/18  15:10    


 


Serum Alcohol  <10 mg/dL  08/08/18  13:40    








 Microbiology





08/09/18 11:20   Hip - Left   Gram Stain - Final


08/09/18 11:20   Hip - Left   Wound Culture - Final











Assessment and Plan


(1) Renal insufficiency


Current Visit: Yes   Status: Acute   Code(s): N28.9 - DISORDER OF KIDNEY AND 

URETER, UNSPECIFIED   SNOMED Code(s): 740268911


   





(2) Fall


Narrative/Plan: 


the patient's worsening status and concerns to her ability to care for himself.

  The patient is evidence of the multiple wounds that developed from his fall 

and being unable to buy his own power get up off the floor.  We'll place 

mupirocin over the wounds to the face as well as to the chest wall and abdomen 

area.  To the left hand and palmar surface Silvadene will be applied will also 

be applied to the ulceration to the left leg and wrapped and place nonstick 

with gauze.


Antibiotic therapy originally was considered with vancomycin however with his 

elevated creatinine his changed to daptomycin while cultures are pending.  We'

ll obtain a whole-body bone scan to evaluate the areas of pressure to determine 

if there is potential underlying bony infection.


Patient seems to responding well to antibiotic therapy at this time.  He is 

somewhat comfortable.  Waiting to determine what her overall actions will be 

once cultures are been finalized.  Continue local wound care at this point in 

time with the mupirocin to the injuries and ulceration with eschar to the face 

and chest wall.  To the eschars to the left lateral thigh lower left leg and 

right knee area Silvadene is applied to these areas.  Always presser 

ulcerations are with eschar and are unstageable at this time.  They were all 

present on admission.  Bone scan is pending to determine if there is any deeper 

infection at this time.


08/13/2018 patient is deathly more comfortable.  The wound care is reviewed 

with the nursing staff and they're having no difficulties with it and the 

patient is deathly more comfortable.  Will be transferred to rehab tomorrow to 

receive physical therapy and wound care.  The bone scan fails to reveal 

evidence of underlying osteomyelitis to the sites but has had extensive trauma 

to the wrist but x-ray fails reveal evidence of fracture.


Cultures reviewed polymicrobial possibly contaminated with stool.  The patient 

is showing marked improvement at this time and consequently will transition to 

oral Augmentin which can be continued at the extended care facility with local 

wound care.


Current Visit: Yes   Status: Acute   Code(s): W19.XXXA - UNSPECIFIED FALL, 

INITIAL ENCOUNTER   SNOMED Code(s): 4157174

## 2018-08-13 NOTE — P.PN
Subjective


Progress Note Date: 08/13/18


Principal diagnosis: 





Fall


Multiple decubitus ulcers on left side


Rhabdomyolysis


Acute renal injury


Chronic alcohol use








This is a 83-year-old male history of CVA, alcoholism who apparently became 

weak and fell on the floor where he remained for 2 days since he was not able 

to get up.  He was found lying on the floor on his left side.  Is brought in by 

EMS for evaluation.  He is found to have multiple pressure sores 





08/11/2018


Patient is seen and evaluated in the room at bedside; denies any new complaints 

of pain or shortness of breath; patient questions if he is going to be 

discharged home; I did discuss possible discharge to skilled rehab as planned 

with patient and family; we did discuss bone scan results which show some 

changes and distal forearm and wrist possibly posttraumatic versus infection; 

MRI has been recommended; infection diseases following and we await further 

recommendations from their service





08/12/2018


Patient is seen and evaluated in the room with family members at bedside; 

family requesting transfer of patient to [facility] for skilled rehab; we did 

discuss treatment plan and need for clearance for discharge by infection 

disease with switch to oral antibiotics versus IV antibiotic treatment prior to 

discharge to skilled rehab, patient remains on IV daptomycin 500 mg every 24 

hours; family understands and is agreeable; patient's labs remained stable with 

a white blood count within normal limit and CK down to 201 from 695; we will 

plan to decrease IV fluids; shouldn't is advised to increase oral fluid intake; 

we'll continue to monitor electrolytes, MOISES's and renal function.  Possible 

discharge to skilled rehab in next 24-48 hours pending clearance from ID





08/13/2018


Patient denied any complaints of chest pain or shortness of breath.  Feels 

generally weak.  Wound dressing changes.  Otherwise patient is being continued 

on daptomycin IV currently awaiting final ID recommendations.  Patient will 

need to be transferred to rehab at skilled nursing.  Otherwise no fever no 

chills.  CPK level is trending down.  No acute overnight issues.


All other review of systems negative except the above





Current medications reviewed.





Objective





- Vital Signs


Vital signs: 


 Vital Signs











Temp  98.5 F   08/13/18 21:58


 


Pulse  88   08/13/18 21:58


 


Resp  16   08/13/18 21:58


 


BP  148/77   08/13/18 21:58


 


Pulse Ox  92 L  08/13/18 21:58








 Intake & Output











 08/13/18 08/13/18 08/14/18





 06:59 18:59 06:59


 


Intake Total 590 50 


 


Output Total 200 525 


 


Balance 390 -475 


 


Weight 66 kg 66 kg 


 


Intake:   


 


  Intake, IV Titration  50 





  Amount   


 


    DAPTOmycin 500 mg In  50 





    Sodium Chloride 0.9% 50   





    ml @ 100 mls/hr IVPB Q24H   





    [facility] Rx#:[id number]   


 


  Oral 590  


 


Output:   


 


  Urine 200 525 


 


Other:   


 


  Voiding Method Urinal  Urinal





 Incontinent  Incontinent


 


  # Voids 1 1 














- Exam





PHYSICAL EXAMINATION: 


Patient is lying in the bed comfortably, no acute distress, awake alert and 

oriented.. 


HEENT: Normocephalic. Neck is supple. Pupils reactive. Nostrils clear. Oral 

cavity is moist. Ears reveal no drainage. 


Neck reveals no JVD, carotid bruits, or thyromegaly. 


CHEST EXAMINATION: Trachea is central. Symmetrical expansion. Lung fields clear 

to auscultation and percussion. 


CARDIAC: Normal S1, S2 with no gallops. No murmurs 


ABDOMEN: Soft. Bowel sounds normal. No organomegaly. No abdominal bruits. 


Extremities: reveal no edema.  No clubbing or cyanosis


Neurologically awake, alert, oriented x3 with well-coordinated movements.  No 

focal deficits noted


Skin: Integumentary: There are multiple decubitus ulcers on the left chest wall 

and left abdomen with surrounding erythema and edema.  Large hematoma to the 

left hand with significant edema to the wrist and hand.  Decubitus ulcer to the 

left lateral knee.  All wounds have surrounding erythema with purulent slug.... 


Psychiatric: Coperative.  Nonsuicidal


Musculoskeletal: No joint swelling or deformity.  Normal range of motion.








- Labs


CBC & Chem 7: 


 08/12/18 06:21





 08/13/18 07:38


Labs: 


 Abnormal Lab Results - Last 24 Hours (Table)











  08/13/18 Range/Units





  07:38 


 


Sodium  136 L  (137-145)  mmol/L


 


Creatinine  0.54 L  (0.66-1.25)  mg/dL














Assessment and Plan


Assessment: 





1.  Acute Rhabdomyolysis due to lying on the floor for prolonged period of time.


- Clinically improving; we was continued with IV fluid hydration lactated 

Ringer at 100 mL an hour.  Currently on normal saline at 50 mL per hour.


- Monitor total CK periodically; slowly trending down





2.  Acute kidney injury


- Resolved with IV fluid hydration off lactated Ringer at 100 mL an hour; we 

will decrease IV fluids to 50 mL an hour


- We will continue to monitor strict MOISES's, renal function and electrolytes


- We will avoid hypotension and nephrotoxins





3.  Multiple infected decubitus ulcers/ cellulitis


- ID is following


- Patient remains on IV daptomycin 500 mg every 24 hours as recommended by ID


- Local wound care as recommended by ID





4.  Hypertension; stable without any medications; we will continue to monitor





5.  Hyperlipidemia; continue with home dose of fenofibrate





6.  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





7.  Chronic alcohol abuse with impending DTs


- Patient remains on CIWA protocol


- Counseling done and cessation of alcohol use





8.  Chronic tobacco abuse; we will continue with nicotine patch as prescribed





9.  DVT prophylaxis; subcu heparin





CODE STATUS; full code





Disposition; patient remains clinically stable; possible discharge to skilled 

nursing facility in next 24-48 hours pending clearance from ID





Time with Patient: Greater than 30

## 2018-08-14 NOTE — P.PN
Subjective


Progress Note Date: 08/14/18


Principal diagnosis: 





Fall


Multiple decubitus ulcers on left side


Rhabdomyolysis


Acute renal injury


Chronic alcohol use





This is a 83-year-old male history of CVA, alcoholism who apparently became 

weak and fell on the floor where he remained for 2 days since he was not able 

to get up.  He was found lying on the floor on his left side.  Is brought in by 

EMS for evaluation.  He is found to have multiple pressure sores 





08/11/2018


Patient is seen and evaluated in the room at bedside; denies any new complaints 

of pain or shortness of breath; patient questions if he is going to be 

discharged home; I did discuss possible discharge to skilled rehab as planned 

with patient and family; we did discuss bone scan results which show some 

changes and distal forearm and wrist possibly posttraumatic versus infection; 

MRI has been recommended; infection diseases following and we await further 

recommendations from their service





08/12/2018


Patient is seen and evaluated in the room with family members at bedside; 

family requesting transfer of patient to [facility] for skilled rehab; we did 

discuss treatment plan and need for clearance for discharge by infection 

disease with switch to oral antibiotics versus IV antibiotic treatment prior to 

discharge to skilled rehab, patient remains on IV daptomycin 500 mg every 24 

hours; family understands and is agreeable; patient's labs remained stable with 

a white blood count within normal limit and CK down to 201 from 695; we will 

plan to decrease IV fluids; shouldn't is advised to increase oral fluid intake; 

we'll continue to monitor electrolytes, MOISES's and renal function.  Possible 

discharge to skilled rehab in next 24-48 hours pending clearance from ID





08/13/2018


Patient denied any complaints of chest pain or shortness of breath.  Feels 

generally weak.  Wound dressing changes.  Otherwise patient is being continued 

on daptomycin IV currently awaiting final ID recommendations.  Patient will 

need to be transferred to rehab at skilled nursing.  Otherwise no fever no 

chills.  CPK level is trending down.  No acute overnight issues.





08/14/2018


Patient denied any complaints of chest pain or shortness of breath.  

Antibiotics have been changed to Augmentin twice daily.  Otherwise patient is 

stable to be discharged to rehab.  No other acute overnight issues.





All other review of systems negative except the above





Current medications reviewed.





Objective





- Vital Signs


Vital signs: 


 Vital Signs











Temp  97.0 F L  08/14/18 21:24


 


Pulse  67   08/14/18 21:24


 


Resp  15   08/14/18 21:24


 


BP  150/71   08/14/18 21:24


 


Pulse Ox  94 L  08/14/18 21:24








 Intake & Output











 08/14/18 08/14/18 08/15/18





 06:59 18:59 06:59


 


Intake Total   50


 


Output Total 150 350 175


 


Balance -150 -350 -125


 


Intake:   


 


  Oral   50


 


Output:   


 


  Urine 150 350 175


 


Other:   


 


  Voiding Method Urinal Urinal 





 Incontinent Incontinent 


 


  # Voids 2  














- Exam





PHYSICAL EXAMINATION: 


Patient is lying in the bed comfortably, no acute distress, awake alert and 

oriented.. 


HEENT: Normocephalic. Neck is supple. Pupils reactive. Nostrils clear. Oral 

cavity is moist. Ears reveal no drainage. 


Neck reveals no JVD, carotid bruits, or thyromegaly. 


CHEST EXAMINATION: Trachea is central. Symmetrical expansion. Lung fields clear 

to auscultation and percussion. 


CARDIAC: Normal S1, S2 with no gallops. No murmurs 


ABDOMEN: Soft. Bowel sounds normal. No organomegaly. No abdominal bruits. 


Extremities: reveal no edema.  No clubbing or cyanosis


Neurologically awake, alert, oriented x3 with well-coordinated movements.  No 

focal deficits noted


Skin: Integumentary: There are multiple decubitus ulcers on the left chest wall 

and left abdomen with surrounding erythema and edema.  Improved.  Left hand is 

wrapped and edema improved.  Decubitus ulcer to the left lateral knee.  All 

wounds have surrounding erythema with purulent slug.... 


Psychiatric: Coperative.  Nonsuicidal


Musculoskeletal: No joint swelling or deformity.  Normal range of motion.








- Labs


CBC & Chem 7: 


 08/14/18 07:16





 08/14/18 07:16


Labs: 


 Abnormal Lab Results - Last 24 Hours (Table)











  08/14/18 08/14/18 Range/Units





  07:16 07:16 


 


RBC   3.98 L  (4.30-5.90)  m/uL


 


Hgb   12.3 L  (13.0-17.5)  gm/dL


 


Hct   37.2 L  (39.0-53.0)  %


 


Sodium  136 L   (137-145)  mmol/L














Assessment and Plan


Assessment: 





1.  Acute Rhabdomyolysis due to lying on the floor for prolonged period of time.


- Clinically improving; we was continued with IV fluid hydration lactated 

Ringer at 100 mL an hour.  Currently on normal saline at 50 mL per hour.


- Monitor total CK periodically; slowly trending down





2.  Acute kidney injury


- Resolved with IV fluid hydration off lactated Ringer at 100 mL an hour; 

decrease IV fluids to 50 mL an hour


- We will continue to monitor strict MOISES's, renal function and electrolytes


- We will avoid hypotension and nephrotoxins





3.  Multiple infected decubitus ulcers/ cellulitis


- ID is following


- Patient remains on IV daptomycin 500 mg every 24 hours as recommended by ID


- Local wound care as recommended by ID





4.  Hypertension; stable without any medications; we will continue to monitor





5.  Hyperlipidemia; continue with home dose of fenofibrate





6.  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





7.  Chronic alcohol abuse with impending DTs


- As needed [facility] protocol


- Counseling done and cessation of alcohol use





8.  Chronic tobacco abuse; we will continue with nicotine patch as prescribed





9.  DVT prophylaxis; subcu heparin





CODE STATUS; full code





Disposition; patient remains clinically stable; possible discharge to skilled 

nursing facility in next 24-48 hours when bed is available.





Time with Patient: Greater than 30

## 2018-08-14 NOTE — P.DS
Providers


Date of admission: 


08/08/18 15:52





Expected date of discharge: 08/14/18


Attending physician: 


Matt Gonzalez





Consults: 





 





08/08/18 15:54


Consult Physician Routine 


   Consulting Provider: Mathew Cuevas


   Consult Reason/Comments: Pressure sore evaluation


   Do you want consulting provider notified?: Yes











Primary care physician: 


[facility] Course: 





Discharge diagnosis


1.  Acute Rhabdomyolysis due to lying on the floor for prolonged period of time.


- CPK level improved with IV hydration.  





2.  Acute kidney injury.  Resolved.





3.  Multiple infected decubitus ulcers/ cellulitis


- Patient was on IV daptomycin 500 mg every 24 hours as recommended by ID


- Local wound care as recommended by ID.  On mupirocin ointment as well as 

Silvadene cream.


- Antibiotics changed to Augmentin orally and continue with dressing changes.





4.  Hypertension; controlled.





5.  Hyperlipidemia; continue with home dose of fenofibrate





6.  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





7.  Chronic alcohol abuse with impending DTs


- Counseling done and cessation of alcohol use





8.  Chronic tobacco abuse; we will continue with nicotine patch as prescribed





9.  DVT prophylaxis; subcu heparin





CODE STATUS; full code





Hospital course


This is a 83-year-old male history of CVA, alcoholism who apparently became 

weak and fell on the floor where he remained for 2 days since he was not able 

to get up.  He was found lying on the floor on his left side.  Is brought in by 

EMS for evaluation.  He is found to have multiple pressure sores 





08/11/2018


Patient is seen and evaluated in the room at bedside; denies any new complaints 

of pain or shortness of breath; patient questions if he is going to be 

discharged home; I did discuss possible discharge to skilled rehab as planned 

with patient and family; we did discuss bone scan results which show some 

changes and distal forearm and wrist possibly posttraumatic versus infection; 

MRI has been recommended; infection diseases following and we await further 

recommendations from their service





08/12/2018


Patient is seen and evaluated in the room with family members at bedside; 

family requesting transfer of patient to [facility] for skilled rehab; we did 

discuss treatment plan and need for clearance for discharge by infection 

disease with switch to oral antibiotics versus IV antibiotic treatment prior to 

discharge to skilled rehab, patient remains on IV daptomycin 500 mg every 24 

hours; family understands and is agreeable; patient's labs remained stable with 

a white blood count within normal limit and CK down to 201 from 695; we will 

plan to decrease IV fluids; shouldn't is advised to increase oral fluid intake; 

we'll continue to monitor electrolytes, MOISES's and renal function.  Possible 

discharge to skilled rehab in next 24-48 hours pending clearance from ID





08/13/2018


Patient denied any complaints of chest pain or shortness of breath.  Feels 

generally weak.  Wound dressing changes.  Otherwise patient is being continued 

on daptomycin IV currently awaiting final ID recommendations.  Patient will 

need to be transferred to rehab at skilled nursing.  Otherwise no fever no 

chills.  CPK level is trending down.  No acute overnight issues.


All other review of systems negative except the above





08/14/2018


Patient denied any new complaints today.  Daptomycin IV has been discontinued 

and changed to Augmentin as per ID recommendations.  Patient will be continued 

on PT OT and dressing changes and the stable to be discharged to rehab.








PHYSICAL EXAMINATION: 


Patient is lying in the bed comfortably, no acute distress, awake alert and 

oriented.. 


HEENT: Normocephalic. Neck is supple. Pupils reactive. Nostrils clear. Oral 

cavity is moist. Ears reveal no drainage. 


Neck reveals no JVD, carotid bruits, or thyromegaly. 


CHEST EXAMINATION: Trachea is central. Symmetrical expansion. Lung fields clear 

to auscultation and percussion. 


CARDIAC: Normal S1, S2 with no gallops. No murmurs 


ABDOMEN: Soft. Bowel sounds normal. No organomegaly. No abdominal bruits. 


Extremities: reveal no edema.  No clubbing or cyanosis


Neurologically awake, alert, oriented x3 with well-coordinated movements.  No 

focal deficits noted


Skin: Integumentary: There are multiple decubitus ulcers on the left chest wall 

and left abdomen with surrounding erythema.  Improved hematoma on the left hand 

and wrist..  Decubitus ulcer to the left lateral knee.  No purulent discharge.


Psychiatric: Coperative.  Nonsuicidal


Musculoskeletal: No joint swelling or deformity.  Normal range of motion.








 Vital Signs - 24 hr











  08/13/18 08/14/18





  21:58 06:54


 


Temperature 98.5 F 97.6 F


 


Pulse Rate [ 88 69





Right Pulse  





Oximetery]  


 


Respiratory 16 16





Rate  


 


Blood Pressure 148/77 144/89





[Right Arm]  


 


O2 Sat by Pulse 92 L 94 L





Oximetry  














Total time taken greater than 35 minutes including 18 minutes for counseling 

and coordination of care.


Patient Condition at Discharge: Serious





Plan - Discharge Summary


Discharge Rx Participant: No


New Discharge Prescriptions: 


New


   Amoxic-Pot Clav 875-125Mg [Augmentin 875-125] 1 each PO Q12HR 8 Days #16 tab


   Multivitamins, Thera [Multivitamin (formulary)] 1 each PO DAILY@1200 #30 tab


   Mupirocin 2% Oint [Bactroban 2% Oint] 1 applic TOPICAL BID #1 applic


   SILVER sulfADIAZINE Cream [Silvadene 1% Cream] 1 applic TOPICAL DAILY #1 tube





Continue


   Cholecalciferol [Vitamin D3] 1,000 unit PO DAILY@1200


   Ascorbic Acid [Vitamin C] 500 mg PO DAILY@1200


   Lisinopril [Zestril] 10 mg PO DAILY


   Citalopram Hydrobromide [CeleXA] 30 mg PO DAILY


   Thiamine HCl [Vitamin B-1] 100 mg PO DAILY


   Fenofibrate Nanocrystallized [Tricor] 145 mg PO DAILY


   Aspirin EC [Ecotrin Low Dose] 81 mg PO DAILY


Discharge Medication List





Ascorbic Acid [Vitamin C] 500 mg PO DAILY@1200 01/21/15 [History]


Cholecalciferol [Vitamin D3] 1,000 unit PO DAILY@1200 01/21/15 [History]


Citalopram Hydrobromide [CeleXA] 30 mg PO DAILY 01/21/15 [History]


Fenofibrate Nanocrystallized [Tricor] 145 mg PO DAILY 01/21/15 [History]


Lisinopril [Zestril] 10 mg PO DAILY 01/21/15 [History]


Thiamine HCl [Vitamin B-1] 100 mg PO DAILY 01/21/15 [History]


Aspirin EC [Ecotrin Low Dose] 81 mg PO DAILY 08/08/18 [History]


Amoxic-Pot Clav 875-125Mg [Augmentin 875-125] 1 each PO Q12HR 8 Days #16 tab 08/ 14/18 [Rx]


Multivitamins, Thera [Multivitamin (formulary)] 1 each PO DAILY@1200 #30 tab 08/ 14/18 [Rx]


Mupirocin 2% Oint [Bactroban 2% Oint] 1 applic TOPICAL BID #1 applic 08/14/18 [

Rx]


SILVER sulfADIAZINE Cream [Silvadene 1% Cream] 1 applic TOPICAL DAILY #1 tube 08 /14/18 [Rx]








Follow up Appointment(s)/Referral(s): 


José Manuel Flores DO [Primary Care Provider] - 1-2 days


Patient Instructions/Handouts:  Silver Sulfadiazine (On the skin), Mupirocin (

On the skin), Amoxicillin/Clavulanate Potassium (By mouth), Multivitamins, 

Adult Formula (By mouth), Dehydration (DC), Rhabdomyolysis (DC), Fall 

Prevention for Older Adults (DC)


Activity/Diet/Wound Care/Special Instructions: 


ECF on discharge





Diet: Consistent Carb





Wound Care per Dr. Cuevas:  Silvadene Daily to Left Hand dorsum and palmar, 

Left leg knee and thigh ulcers, Right leg ulcers.  Cover with nonstick and 

rolled gauze.  Bactroban BID to sites of injury to left chest wall and abdomen.

  Bactroban TID to wounds on left side of face (does not need to be covered).


Discharge Disposition: TRANSFER TO SNF/ECF

## 2019-02-10 ENCOUNTER — HOSPITAL ENCOUNTER (EMERGENCY)
Dept: HOSPITAL 47 - EC | Age: 84
Discharge: HOME | End: 2019-02-10
Payer: MEDICARE

## 2019-02-10 VITALS — DIASTOLIC BLOOD PRESSURE: 82 MMHG | RESPIRATION RATE: 18 BRPM | HEART RATE: 75 BPM | SYSTOLIC BLOOD PRESSURE: 123 MMHG

## 2019-02-10 VITALS — TEMPERATURE: 98.2 F

## 2019-02-10 DIAGNOSIS — F17.200: ICD-10-CM

## 2019-02-10 DIAGNOSIS — Z91.09: ICD-10-CM
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
## 2019-02-10 DIAGNOSIS — M19.90: ICD-10-CM

## 2019-02-10 DIAGNOSIS — Z96.642: ICD-10-CM

## 2019-02-10 DIAGNOSIS — I10: ICD-10-CM

## 2019-02-10 DIAGNOSIS — R29.6: ICD-10-CM

## 2019-02-10 DIAGNOSIS — Z98.890: ICD-10-CM

## 2019-02-10 DIAGNOSIS — Z79.1: ICD-10-CM

## 2019-02-10 DIAGNOSIS — S82.032A: ICD-10-CM

## 2019-02-10 DIAGNOSIS — S00.31XA: ICD-10-CM

## 2019-02-10 DIAGNOSIS — Z79.82: ICD-10-CM

## 2019-02-10 DIAGNOSIS — S62.301A: ICD-10-CM

## 2019-02-10 DIAGNOSIS — E78.5: ICD-10-CM

## 2019-02-10 DIAGNOSIS — S62.292A: Primary | ICD-10-CM

## 2019-02-10 DIAGNOSIS — M25.511: ICD-10-CM

## 2019-02-10 DIAGNOSIS — Z79.899: ICD-10-CM

## 2019-02-10 PROCEDURE — 73130 X-RAY EXAM OF HAND: CPT

## 2019-02-10 PROCEDURE — 29125 APPL SHORT ARM SPLINT STATIC: CPT

## 2019-02-10 PROCEDURE — 73030 X-RAY EXAM OF SHOULDER: CPT

## 2019-02-10 PROCEDURE — 72125 CT NECK SPINE W/O DYE: CPT

## 2019-02-10 PROCEDURE — 99284 EMERGENCY DEPT VISIT MOD MDM: CPT

## 2019-02-10 PROCEDURE — 70450 CT HEAD/BRAIN W/O DYE: CPT

## 2019-02-10 PROCEDURE — 73562 X-RAY EXAM OF KNEE 3: CPT

## 2019-02-10 NOTE — XR
EXAMINATION TYPE: XR knee complete LT

 

DATE OF EXAM: 2/10/2019

 

COMPARISON: None

 

HISTORY: Pain following fall

 

TECHNIQUE: Three-view left knee

 

FINDINGS: There is a large joint effusion. There is a fracture through the superior third of the lux
lla.

 

No additional fractures are identified. Joint spaces appear preserved.

 

IMPRESSION:

1.  Transverse fracture proximal patella.

2. Large joint effusion

## 2019-02-10 NOTE — ED
General Adult HPI





- General


Chief complaint: Fall


Stated complaint: Fall


Time Seen by Provider: 02/10/19 15:45


Source: patient, RN notes reviewed


Mode of arrival: wheelchair


Limitations: no limitations





- History of Present Illness


Initial comments: 





84-year-old male presents to the emergency department for a chief complaint of 

fall occurring about one hour prior to arrival.  Patient was walking and his 

cowboy boots when he tripped over the edge of the carpet and fell forward.  

Patient did apparently hit his head and has a very small superficial abrasion 

noted to the ridge of the nose from his glasses.  Patient has left hand pain, 

right shoulder pain and left knee pain.  Patient cannot currently ambulate on 

the left knee.  [facility] 

[facility] can be provided.  Patient denies any neck pain.  He denies any back 

pain.  No other injuries.  He denies any headedness or dizziness preceding the 

fall.  No loss of consciousness or blood thinners.Patient has no other 

complaints at this time including shortness of breath, chest pain, abdominal 

pain, nausea or vomiting, headache, or visual changes.





- Related Data


 Home Medications











 Medication  Instructions  Recorded  Confirmed


 


Ascorbic Acid [Vitamin C] 500 mg PO DAILY 01/21/15 02/10/19


 


Cholecalciferol [Vitamin D3] 2,000 unit PO DAILY 01/21/15 02/10/19


 


Citalopram Hydrobromide [CeleXA] 20 mg PO DAILY 01/21/15 02/10/19


 


Lisinopril [Zestril] 10 mg PO DAILY 01/21/15 02/10/19


 


Thiamine HCl [Vitamin B-1] 100 mg PO DAILY 01/21/15 02/10/19


 


Aspirin EC [Ecotrin Low Dose] 81 mg PO DAILY 08/08/18 02/10/19


 


Docusate [Colace] 100 mg PO DAILY 02/10/19 02/10/19


 


Fenofibrate,Micronized 134 mg PO DAILY 02/10/19 02/10/19





[Fenofibrate]   


 


HYDROcodone/APAP 7.5-325MG [Norco 1 tab PO Q6H PRN 02/10/19 02/10/19





7.5-325]   


 


Ibuprofen [Advil] 200 mg PO BID 02/10/19 02/10/19


 


Multivitamins, Thera [Multivitamin 1 tab PO DAILY 02/10/19 02/10/19





(formulary)]   











 Allergies











Allergy/AdvReac Type Severity Reaction Status Date / Time


 


adhesive tape Allergy  Rash/Hives Verified 02/10/19 16:13














Review of Systems


ROS Statement: 


Those systems with pertinent positive or pertinent negative responses have been 

documented in the HPI.





ROS Other: All systems not noted in ROS Statement are negative.





Past Medical History


Past Medical History: CVA/TIA, Hyperlipidemia, Hypertension, Osteoarthritis (OA)


Additional Past Medical History / Comment(s): tia, alcoholic, freq falls. past 

lt humeral/lt femoral fx, kidney stone 40 plus years ago. at times tremors, 

irreg sleep apttern


History of Any Multi-Drug Resistant Organisms: None Reported


Past Surgical History: Hernia Repair, Orthopedic Surgery


Additional Past Surgical History / Comment(s): SHOULDER LEFT 25 YEARS AGO, lt 

hip hemiarthroplasty


Past Anesthesia/Blood Transfusion Reactions: No Reported Reaction


Past Psychological History: No Psychological Hx Reported


Smoking Status: Current every day smoker





- Past Family History


  ** Mother


Family Medical History: Hypertension





  ** Father


Additional Family Medical History / Comment(s):  in [place] when pt was 12 

years old





General Exam





- General Exam Comments


Initial Comments: 





Left knee: Edema without erythema noted to the left anterior knee.  Generalized 

anterior knee tenderness.  Patient has about 20 flexion of the left knee.  

Capillary refill less than 2 seconds in the left lower extremity.  DP pulse 2+ 

in the left foot.  No significant pain or edema noted in the left calf.


Left hand: Patient has a ecchymosis with edema noted to the proximal phalanx of 

the left thumb as well as proximal phalanx of the left second finger.  With 

digit noted to be held in a flexed position due to previous injury.  Patient 

has limited flexion of first and second digits of left hand.  Capillary refill 

less than 2 seconds in all digits of the left hand.  Radial pulse 2+.


Right shoulder:  Patient does have full range of motion of the right shoulder 

including flexion and abduction but does have some pain with this.  Radial 

pulse 2+ in the right upper extremity.  Capillary refill less than 2 seconds.  

No ecchymosis noted to the right shoulder.  No edema to the right upper 

extremity.


Limitations: no limitations


General appearance: alert, in no apparent distress


Head exam: Present: atraumatic, normocephalic, normal inspection


Eye exam: Present: normal appearance, PERRL, EOMI.  Absent: scleral icterus, 

conjunctival injection, periorbital swelling


ENT exam: Present: normal exam, normal oropharynx, mucous membranes moist, TM's 

normal bilaterally, normal external ear exam, other (small 1 cm superficial 

abrasion noted the the left side of the bridge of the nose)


Neck exam: Present: normal inspection, full ROM.  Absent: tenderness, 

meningismus, lymphadenopathy


Respiratory exam: Present: normal lung sounds bilaterally.  Absent: respiratory 

distress, wheezes, rales, rhonchi, stridor


Cardiovascular Exam: Present: regular rate, normal rhythm, normal heart sounds.

  Absent: systolic murmur, diastolic murmur, rubs, gallop, clicks


Neurological exam: Present: alert, oriented X3, CN II-XII intact, other (GCS 15)


Psychiatric exam: Present: normal affect, normal mood





Course


 Vital Signs











  02/10/19 02/10/19





  15:24 18:26


 


Temperature 98.2 F 


 


Pulse Rate 75 76


 


Respiratory 18 16





Rate  


 


Blood Pressure 139/79 122/66


 


O2 Sat by Pulse 97 96





Oximetry  














Procedures





- Orthopedic Splinting/Casting


  ** Injury #1


Side: left


Upper Extremity Injury Location: hand


Upper Extremity Immobilizer: volar splint, thumb spica


Additional Comments: 





Neurovascular status intact after splint applied





Medical Decision Making





- Medical Decision Making





84-year-old male presents to the emergency department for a chief complaint of 

fall occurring about one hour prior to arrival.  Patient tripped over the carpet

, no loss of consciousness.  He did hit his head but no blood thinners.  CT 

brain and C-spine negative for acute process.  As documented.  Hand x-ray did 

show 3 different fractures.  There is a comminuted transverse fracture of the 

proximal metaphysis of the first metacarpal.  A fracture of the proximal 

diaphysis thumb.  Oblique fracture of the proximal phalanx index finger.  X-ray 

of the left knee did show a transverse fracture of the proximal patella.  It 

did take a considerable amount of time to receive the x-ray reports which 

patient and family member were both upset about.  However I did discuss that it 

was important to have the reports before discharge so I could make sure to 

address every injury.  Patient was splinted in a thumb spica and volar short 

arm for these hand fractures.  This was difficult as patient unable to flex or 

extend fingers and fourth digit held in a flexed position with significant pain 

with any type of extension.  However splint was applied.  Knee immobilizer 

applied to the left lower extremity for the patellar fracture.  Patient saw Dr. Hernandez for a previous hand surgery on the fourth digit and would like to 

see him again.  I did also give him the phone number to Dr. Butler as he is on-

call.  Patient was given Tylenol 3 as he tolerates that well.  Patient 

currently  staying at [facility] and will have assistance with ambulation.





Disposition


Clinical Impression: 


 Hand fracture, Patella fracture





Disposition: HOME SELF-CARE


Condition: Good


Instructions (If sedation given, give patient instructions):  Hand Fracture (ED)

, Patellar Fracture (ED)


Additional Instructions: 


Please follow-up with Dr. Butler or Dr. Hernandez for orthopedics.  Please 

return here to the emergency department if you have any worsening symptoms.


Is patient prescribed a controlled substance at d/c from ED?: No


Referrals: 


José Manuel Flores DO [Primary Care Provider] - 1-2 days


José Miguel Hernandez DO [Medical Doctor] - 1-2 days


Adrian Butler MD [STAFF PHYSICIAN] - 1-2 days


Time of Disposition: 19:40

## 2019-02-10 NOTE — XR
EXAMINATION TYPE: XR hand complete LT

 

DATE OF EXAM: 2/10/2019

 

COMPARISON: 8/9/2018

 

HISTORY: Pain

 

TECHNIQUE: Left hand is examined in 3 projections. Best possible imaging is performed.

 

FINDINGS: There are oblique fractures through the mid diaphysis proximal phalanx left thumb, a transv
erse fracture at the base of the first metacarpal, an oblique fracture extending towards the articula
r surface of the proximal phalanx index finger.

 

Structures are osteopenic.

 

IMPRESSION:

1.  Comminuted transverse fracture proximal metaphysis first metacarpal.

2. Oblique fracture proximal diaphysis thumb.

3. Oblique fracture which may have extension to the articular surface in the proximal phalanx index f
red.

## 2019-02-10 NOTE — XR
EXAMINATION TYPE: XR shoulder complete RT

 

DATE OF EXAM: 2/10/2019

 

COMPARISON: NONE

 

HISTORY: Pain

 

TECHNIQUE:  Shoulder examined in 3 views

 

FINDINGS: 

The humeral head articulates with the glenoid. There is loss of the joint space compatible with osteo
arthritic degenerative change. No acute fractures are within the field-of-view.

The acromio-clavicular junction is normal.

No acute fractures or dislocations are evident.

 

A follow up study can be performed 7-10 days from acute trauma for continued pain.

 

IMPRESSION:

1. Moderate osteoarthritic degenerative change right shoulder

## 2019-02-10 NOTE — CT
EXAMINATION TYPE: CT brain candido wo con

 

DATE OF EXAM: 2/10/2019

 

COMPARISON: 8/8/2018

 

HISTORY: Fall injury

 

CT DLP: 1280.1 mGycm, Automated exposure control for dose reduction was used.

 

CONTRAST: Patient injected with 0 mL of Isovue 300.

 

CT of the brain is performed utilizing 3 mm thick sections through the posterior fossa and 3 mm thick
 sections through the remaining calvarium.  

 

Study is performed within 24 hours of arrival to the hospital.

 

 No abnormal hyperdensity is present to suggest an acute intracranial hemorrhage.

No mass lesion is evident.

No acute infarcts are evident.  Periventricular white matter hypodensity is present, likely on the ba
sis of chronic white matter ischemic changes.

Ventricles and sulci are prominent for the patient age.  

 

Paranasal sinuses and mastoid air cells within the field-of-view are clear. 

 

IMPRESSIONS:

1. Atrophy with periventricular white matter ischemic changes.

 

CT cervical spine.

 

COMPARISON: None

 

CT of the cervical spine is performed in the axial plane at 2 mm thick sections.  Reconstructed image
s in the coronal, and sagittal plane are reviewed on the computer. 

 

No acute fractures are evident.

Grade 1 spondylolisthesis of C3 anterior on C4 is present.

Loss of disc height is present throughout the cervical spine but especially notable C3-4, C4-5, C5-6,
 C6-7.

Vertebral body heights are preserved.

No spinal canal stenosis is evident.

Some foraminal narrowing is present from uncovertebral joint hypertrophy. 

 

IMPRESSIONS:

1. Degenerative disc changes.

2. Uncovertebral joint hypertrophy with some foraminal narrowing present.

3. Grade 1 spondylolisthesis of C3 anteriorly on C4.

4.  No acute fractures.

## 2019-02-19 ENCOUNTER — HOSPITAL ENCOUNTER (OUTPATIENT)
Dept: HOSPITAL 47 - OR | Age: 84
LOS: 1 days | Discharge: NURSING, REHAB OR LONG-TERM CARE | End: 2019-02-20
Attending: ORTHOPAEDIC SURGERY
Payer: MEDICARE

## 2019-02-19 VITALS — RESPIRATION RATE: 18 BRPM

## 2019-02-19 VITALS — BODY MASS INDEX: 20.7 KG/M2

## 2019-02-19 DIAGNOSIS — Z79.899: ICD-10-CM

## 2019-02-19 DIAGNOSIS — I10: ICD-10-CM

## 2019-02-19 DIAGNOSIS — S62.512A: Primary | ICD-10-CM

## 2019-02-19 DIAGNOSIS — M72.0: ICD-10-CM

## 2019-02-19 DIAGNOSIS — E78.5: ICD-10-CM

## 2019-02-19 DIAGNOSIS — Z91.048: ICD-10-CM

## 2019-02-19 DIAGNOSIS — S82.032A: ICD-10-CM

## 2019-02-19 DIAGNOSIS — S62.232A: ICD-10-CM

## 2019-02-19 DIAGNOSIS — H91.90: ICD-10-CM

## 2019-02-19 DIAGNOSIS — R26.81: ICD-10-CM

## 2019-02-19 DIAGNOSIS — S62.611A: ICD-10-CM

## 2019-02-19 DIAGNOSIS — Z72.0: ICD-10-CM

## 2019-02-19 DIAGNOSIS — Z79.891: ICD-10-CM
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
## 2019-02-19 DIAGNOSIS — F32.9: ICD-10-CM

## 2019-02-19 DIAGNOSIS — Z79.82: ICD-10-CM

## 2019-02-19 PROCEDURE — 97162 PT EVAL MOD COMPLEX 30 MIN: CPT

## 2019-02-19 PROCEDURE — 64415 NJX AA&/STRD BRCH PLXS IMG: CPT

## 2019-02-19 PROCEDURE — 26615 TREAT METACARPAL FRACTURE: CPT

## 2019-02-19 PROCEDURE — 97166 OT EVAL MOD COMPLEX 45 MIN: CPT

## 2019-02-19 PROCEDURE — 73120 X-RAY EXAM OF HAND: CPT

## 2019-02-19 PROCEDURE — 26735 TREAT FINGER FRACTURE EACH: CPT

## 2019-02-19 RX ADMIN — CEFAZOLIN SCH GM: 10 INJECTION, POWDER, FOR SOLUTION INTRAVENOUS at 15:17

## 2019-02-19 RX ADMIN — OXYCODONE AND ACETAMINOPHEN PRN EACH: 5; 325 TABLET ORAL at 18:22

## 2019-02-19 RX ADMIN — POTASSIUM CHLORIDE SCH MLS: 14.9 INJECTION, SOLUTION INTRAVENOUS at 08:01

## 2019-02-19 NOTE — P.ONQ
Anesthesiology Proc Note - PNB





- Peripheral Nerve Block Performed


  ** Left Infraclavicular Single


Time Out Performed: Yes


Procedure Start Time: 07:44


Procedure Stop Time: 07:51


Indication: Acute Post-Operative Pain, Requested by physician


Sedation Type: Sedate with meaningful contact maintained


Preparation: Sterile Prep


Position: Supine


Needle Size: 50mm (2")


Needle Gauge: 21


Technique: Ultrasound (ropi .5% 20cc plus xylo 2% 10cc)


Blood Aspirated: No


Pain Paresthesia on Injection Noted: No


Resistance on Injection: Normal


Events: Uneventful and Well Tolerated

## 2019-02-19 NOTE — FL
EXAMINATION TYPE: FL guidance operating room

 

DATE OF EXAM: 2/19/2019

 

HISTORY: Flouroscopy  time

 

2 minutes and 36 seconds of fluoroscopy provided. 

 

IMPRESSION:

1. Fluoroscopy time.

## 2019-02-19 NOTE — XR
EXAMINATION TYPE: XR hand limited LT

 

DATE OF EXAM: 2/19/2019

 

COMPARISON: 8/9/2018

 

HISTORY: Pain with fracture

 

TECHNIQUE: 5 views submitted

 

FINDINGS: Postsurgical change in near-anatomic alignment

 

IMPRESSION: Postsurgical change

## 2019-02-19 NOTE — P.OP
Date of Procedure: 02/19/19


Preoperative Diagnosis: 


1. Left thumb metacarpal base fracture (Keron)


2. Left thumb proximal phalanx fracture


3. Left index proximal phalanx fracture


Postoperative Diagnosis: 


1. Left thumb metacarpal base fracture (Keron)


2. Left thumb proximal phalanx fracture


3. Left index proximal phalanx fracture


Procedure(s) Performed: 


1.  Open reduction and internal fixation of left thumb metacarpal base fracture 

(Keron)


2.  Open reduction and internal fixation of left thumb proximal phalanx fracture


Implants: 


Mehama Variax Locking 2.3mm T-plate with locking and cortical screws, 0.062 & 

0.054 K-wires


Anesthesia: MAC, regional


Surgeon: José Miguel Hernandez


Assistant #1: Mari Block


Estimated Blood Loss (ml): 5


Pathology: none sent


Condition: stable


Disposition: PACU


Indications for Procedure: 


The patient is an 84 year-old male who sustained a mechanical fall which 

resulted in displaced, unstable fractures of his left thumb and index finger.  

Surgical treatment was recommended.  Risks and benefits were discussed, 

including (but not limited to) the risks of infection, bleeding, injury to 

tendons or neurovascular structures and possible need for additional surgery. 

Questions were invited and answered. The patient and family members expressed 

understanding and wished to proceed with surgery. Consent forms were signed. 

The operative sites were confirmed and marked.





Description of Procedure: 


The patient was administered a regional nerve block by the anesthesia team then 

brought to the operating suite. He was positioned supine with the operative 

limb on an arm board.  All bony prominences were well padded. Anesthesia was 

administered uneventfully. Prophylactic IV antibiotics were administered. A 

tourniquet was placed on the left arm which was then prepped and draped in 

standard, sterile fashion.  A timeout was performed which confirmed the patient

, the operative side, the site and the procedure to be performed.  All team 

members expressed agreement.  The limb was exsanguinated with an Esmarch and 

the tourniquet was inflated.





The fractures were evaluated with intraoperative fluoroscopy.  The index 

proximal phalanx fracture was well aligned.  This was stressed under live 

imaging and found to be stable.  It was no motion at the fracture site with 

gentle flexion and extension of the surrounding joints and minimal motion with 

coronal and sagittal stress at the fracture site.  No further fixation was 

deemed necessary.  Attention was turned to the thumb.





A longitudinal dorsal incision was marked along the thumb proximal phalanx and 

metacarpal, curving gently at the proximal aspect. The skin was incised sharply 

and subcutaneous tissue were spread, coagulating superficial vessels as needed.

  Superficial sensory nerves were identified, mobilized and protected.  The 

periosteum over the metacarpal base was incised and elevated.  The thenar 

musculature was dissected off the volar aspect.  The extensor tendons were 

elevated and mobilized.  The fracture site was identified.  There was marked 

comminution immediately below the subchondral bone with three fracture 

fragments dorsally and a separate volar piece.  Bone quality throughout the 

hand was found to be extremely poor.  Comminution along the volar metaphysis 

was also noted.  A manual reduction was performed with axial traction and 

rotation. A 0.054 K wire was drilled into the distal end of the metacarpal and 

advanced in a retrograde fashion across the fracture site and into the 

trapezium for provisional stability.  The fracture site was opened with a freer 

and cleaned of hematoma and fibrous tissue.  There was a paucity of cancellous 

bone beneath the articular surface and in the metaphysis.  We attempted to 

reduce & hold the fracture with a pointed reduction clamp but the poor 

structural integrity of the existing bone resulted in displacement rather than 

reduction.  A 2.3 mm T plate was selected and positioned on the bone.  A [place] 

and dental pick were used to manipulate the fracture fragments into acceptable 

alignment.  Plate was secured to the bone with a clamp.  A cortical screw was 

drilled, measured and inserted to secure the plate to the proximal fragments, 

using fluoroscopy to assess trajectory and position.  Of note, it was extremely 

difficult to get the appropriate x-ray views due to limited forearm rotation 

and the patient's existing contractures in the middle, ring and small fingers.  

Two additional locking screws were drilled and inserted into the proximal 

portion of the plate.  Two cortical screws were drilled and inserted to secure 

the plate to the shaft distally.  The K wire was removed and attention was 

turned to the proximal phalanx.





The extensor tendons were split along the midline and elevated.  The fracture 

site was identified with traumatic disruption of the dorsal periosteum.  A 

capsulotomy was performed at the MCP joint.  The entire articular surface was 

flexed with small intra-articular fracture lines noted.  The was no cortical 

bone at the proximal dorsal diaphysis.  The dorsal aspect of the articular 

surface was nearly devoid of subchondral bony support and there was no dorsal 

cortex to use for fixation.  The phalanx was grossly unstable with cortical 

bone loss throughout the midshaft.  A 0.062 K wire was inserted percutaneously 

through the end of the distal phalanx and advanced in a retrograde fashion 

across the IP joint.  Axial traction was applied and the fracture was held 

manually reduced as the wire was advanced down the medullary canal.  The 

articular surface of the proximal phalanx at the MCP joint was held in a 

reduced position with a Louisburg while the K wire was advanced across the MCP 

joint.  This afforded good initial stability.  A 0.054 K wire was introduced 

percutaneously at the distal aspect of the proximal phalanx. This was advanced 

obliquely, in a retrograde fashion, down the medullary canal and across the MCP 

joint for additional fixation.  Final x-rays were obtained which demonstrated 

satisfactory overall alignment of both fractures.  These were stressed under 

live fluoroscopy and no gross motion was seen at either fracture site.





The wounds were thoroughly irrigated with normal saline. The thenar musculature

, CMC and MCP joint capsules and periosteum were repaired with interrupted 3-0 

Vicryl sutures.  The split extensor tendon was reapproximated and repaired with 

interrupted sutures. The incision was closed with interrupted 5-0 nylon suture. 

Lidocaine with epinephrine was injected into the perioperative subcutaneous 

tissues for adjunctive postoperative pain control and hemostasis.  The K wires 

were cut short and covered with Jurgan balls. A soft, sterile dressing was 

applied. All sponge and needle counts were correct at the end of the case. The 

patient tolerated the procedure well and was taken to the recovery room in 

stable condition.

## 2019-02-20 VITALS — TEMPERATURE: 98.1 F | DIASTOLIC BLOOD PRESSURE: 77 MMHG | HEART RATE: 66 BPM | SYSTOLIC BLOOD PRESSURE: 133 MMHG

## 2019-02-20 RX ADMIN — CEFAZOLIN SCH GM: 10 INJECTION, POWDER, FOR SOLUTION INTRAVENOUS at 00:43

## 2019-02-20 RX ADMIN — OXYCODONE AND ACETAMINOPHEN PRN EACH: 5; 325 TABLET ORAL at 00:49

## 2019-02-20 RX ADMIN — OXYCODONE AND ACETAMINOPHEN PRN EACH: 5; 325 TABLET ORAL at 11:57

## 2019-02-20 RX ADMIN — POTASSIUM CHLORIDE SCH: 14.9 INJECTION, SOLUTION INTRAVENOUS at 06:41

## 2019-02-20 NOTE — P.PN
Subjective


Progress Note Date: 02/20/19


Principal diagnosis: 





Left hand fractures, left patella fracture





The patient reports postop pain fluctuates in intensity but is well controlled 

with oral medication.  He denies new issues or concerns.  He has not yet worked 

with PT or OT.





Objective





- Vital Signs


Vital signs: 


 Vital Signs











Temp  98.1 F   02/20/19 07:15


 


Pulse  66   02/20/19 07:15


 


Resp  18   02/20/19 07:15


 


BP  133/77   02/20/19 07:15


 


Pulse Ox  95   02/20/19 07:15








 Intake & Output











 02/19/19 02/20/19 02/20/19





 18:59 06:59 18:59


 


Intake Total 1140  240


 


Output Total 105 253 


 


Balance 1035 -253 240


 


Intake:   


 


    


 


  Oral 240  240


 


Output:   


 


  Urine 100 253 


 


  Estimated Blood Loss 5  


 


Other:   


 


  Voiding Method  Toilet 





  Bedside Commode 


 


  # Voids  1 














- Exam


Left hand


Dressings were changed.  Mild to moderate (appropriate) bloody strike through.


Moderate edema of the dorsal hand, index, finger and thumb.


Incision is well approximated with sutures in place.  No erythema or drainage.


K wires in place.  Patient is able to actively range at the thumb CMC joint 

with minimal discomfort





Left knee


Mild residual edema around the left knee.  Tenderness to palpation on the 

patella.


Patient is able to actively range from 0-30 with minimal discomfort.











Assessment and Plan


Assessment: 





1. POD #1 s/p ORIF left thumb antonio and proximal phalanx fractures


2. Left index finger proximal phalanx fracture


3. Left patella fracture


4. Generalized weakness and deconditioning


Plan: 





Dressings changed. Should remain on until followup appointment. May loosen and 

rewrap Coban if too tight.


PT and OT eval pending.


   Goals: Safety eval and gait training (walker with left platform attachment), 

education and teaching to provide assistance with activities of daily living, 

given the patient's new physical/postop limitations


Patient may weight-bear as tolerated on the left lower extremity with the 

hinged knee brace locked in extension.  It may be opened at rest to allow 0-30 

of flexion.  May be removed for showers and hygiene.


The jose luis straps should stay on the index and middle fingers at all times 

except for hand hygiene and when working with the hand therapist.  Hand therapy 

may resume on the patient's obtained contractures but should only focus on the 

middle, ring and small fingers.  NO THERAPY ON THE LEFT INDEX FINGER OR THUMB.  

The patient is encouraged to perform active range of motion of the index finger 

as tolerated while wearing the jose luis straps.


The patient will be placed on oral Keflex for 5 days for prophylaxis of the pin 

sites.  He should continue aspirin 325 mg daily for DVT prophylaxis.


Patient may be discharged back to his facility once PT and OT have been 

completed.


Follow-up outpatient with Dr. Hernandez in 10 days -- call for appointment.

## 2020-12-23 ENCOUNTER — HOSPITAL ENCOUNTER (INPATIENT)
Dept: HOSPITAL 47 - EC | Age: 85
LOS: 1 days | Discharge: NURSING, REHAB OR LONG-TERM CARE | DRG: 562 | End: 2020-12-24
Attending: ORTHOPAEDIC SURGERY | Admitting: ORTHOPAEDIC SURGERY
Payer: MEDICARE

## 2020-12-23 VITALS — BODY MASS INDEX: 20.5 KG/M2

## 2020-12-23 DIAGNOSIS — R29.6: ICD-10-CM

## 2020-12-23 DIAGNOSIS — Z87.442: ICD-10-CM

## 2020-12-23 DIAGNOSIS — Z86.73: ICD-10-CM

## 2020-12-23 DIAGNOSIS — Z82.49: ICD-10-CM

## 2020-12-23 DIAGNOSIS — Z91.048: ICD-10-CM

## 2020-12-23 DIAGNOSIS — F10.21: ICD-10-CM

## 2020-12-23 DIAGNOSIS — Z79.899: ICD-10-CM

## 2020-12-23 DIAGNOSIS — Z79.82: ICD-10-CM

## 2020-12-23 DIAGNOSIS — Z87.81: ICD-10-CM

## 2020-12-23 DIAGNOSIS — S42.012A: Primary | ICD-10-CM

## 2020-12-23 DIAGNOSIS — E78.5: ICD-10-CM

## 2020-12-23 DIAGNOSIS — Z87.19: ICD-10-CM

## 2020-12-23 DIAGNOSIS — F32.9: ICD-10-CM

## 2020-12-23 DIAGNOSIS — M19.90: ICD-10-CM

## 2020-12-23 DIAGNOSIS — F17.210: ICD-10-CM

## 2020-12-23 DIAGNOSIS — M97.02XA: ICD-10-CM

## 2020-12-23 DIAGNOSIS — I10: ICD-10-CM

## 2020-12-23 DIAGNOSIS — Z96.642: ICD-10-CM

## 2020-12-23 DIAGNOSIS — S72.115A: ICD-10-CM
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
## 2020-12-23 LAB
ALBUMIN SERPL-MCNC: 4.8 G/DL (ref 3.5–5)
ALP SERPL-CCNC: 42 U/L (ref 38–126)
ALT SERPL-CCNC: 17 U/L (ref 4–49)
ANION GAP SERPL CALC-SCNC: 12 MMOL/L
APTT BLD: 26.9 SEC (ref 22–30)
AST SERPL-CCNC: 31 U/L (ref 17–59)
BASOPHILS # BLD AUTO: 0.1 K/UL (ref 0–0.2)
BASOPHILS NFR BLD AUTO: 1 %
BUN SERPL-SCNC: 38 MG/DL (ref 9–20)
CALCIUM SPEC-MCNC: 10.6 MG/DL (ref 8.4–10.2)
CHLORIDE SERPL-SCNC: 107 MMOL/L (ref 98–107)
CO2 SERPL-SCNC: 25 MMOL/L (ref 22–30)
EOSINOPHIL # BLD AUTO: 0.1 K/UL (ref 0–0.7)
EOSINOPHIL NFR BLD AUTO: 1 %
ERYTHROCYTE [DISTWIDTH] IN BLOOD BY AUTOMATED COUNT: 5.09 M/UL (ref 4.3–5.9)
ERYTHROCYTE [DISTWIDTH] IN BLOOD: 14.1 % (ref 11.5–15.5)
GLUCOSE BLD-MCNC: 101 MG/DL (ref 75–99)
GLUCOSE SERPL-MCNC: 187 MG/DL (ref 74–99)
HCT VFR BLD AUTO: 45.8 % (ref 39–53)
HGB BLD-MCNC: 15.2 GM/DL (ref 13–17.5)
INR PPP: 0.9 (ref ?–1.2)
LYMPHOCYTES # SPEC AUTO: 1.1 K/UL (ref 1–4.8)
LYMPHOCYTES NFR SPEC AUTO: 11 %
MCH RBC QN AUTO: 29.8 PG (ref 25–35)
MCHC RBC AUTO-ENTMCNC: 33.1 G/DL (ref 31–37)
MCV RBC AUTO: 90 FL (ref 80–100)
MONOCYTES # BLD AUTO: 0.7 K/UL (ref 0–1)
MONOCYTES NFR BLD AUTO: 7 %
NEUTROPHILS # BLD AUTO: 8.4 K/UL (ref 1.3–7.7)
NEUTROPHILS NFR BLD AUTO: 79 %
PLATELET # BLD AUTO: 364 K/UL (ref 150–450)
POTASSIUM SERPL-SCNC: 3.9 MMOL/L (ref 3.5–5.1)
PROT SERPL-MCNC: 8.3 G/DL (ref 6.3–8.2)
PT BLD: 9.7 SEC (ref 9–12)
SODIUM SERPL-SCNC: 144 MMOL/L (ref 137–145)
WBC # BLD AUTO: 10.5 K/UL (ref 3.8–10.6)

## 2020-12-23 PROCEDURE — 70450 CT HEAD/BRAIN W/O DYE: CPT

## 2020-12-23 PROCEDURE — 99285 EMERGENCY DEPT VISIT HI MDM: CPT

## 2020-12-23 PROCEDURE — 85610 PROTHROMBIN TIME: CPT

## 2020-12-23 PROCEDURE — 80053 COMPREHEN METABOLIC PANEL: CPT

## 2020-12-23 PROCEDURE — 84484 ASSAY OF TROPONIN QUANT: CPT

## 2020-12-23 PROCEDURE — 81003 URINALYSIS AUTO W/O SCOPE: CPT

## 2020-12-23 PROCEDURE — 73502 X-RAY EXAM HIP UNI 2-3 VIEWS: CPT

## 2020-12-23 PROCEDURE — 85025 COMPLETE CBC W/AUTO DIFF WBC: CPT

## 2020-12-23 PROCEDURE — 85730 THROMBOPLASTIN TIME PARTIAL: CPT

## 2020-12-23 PROCEDURE — 36415 COLL VENOUS BLD VENIPUNCTURE: CPT

## 2020-12-23 PROCEDURE — 96360 HYDRATION IV INFUSION INIT: CPT

## 2020-12-23 PROCEDURE — 72125 CT NECK SPINE W/O DYE: CPT

## 2020-12-23 PROCEDURE — 71045 X-RAY EXAM CHEST 1 VIEW: CPT

## 2020-12-23 PROCEDURE — 93005 ELECTROCARDIOGRAM TRACING: CPT

## 2020-12-23 RX ADMIN — CEFAZOLIN SCH MLS/HR: 330 INJECTION, POWDER, FOR SOLUTION INTRAMUSCULAR; INTRAVENOUS at 22:28

## 2020-12-23 RX ADMIN — CEFAZOLIN SCH MLS/HR: 330 INJECTION, POWDER, FOR SOLUTION INTRAMUSCULAR; INTRAVENOUS at 11:08

## 2020-12-23 NOTE — ED
Fall HPI





- General


Chief Complaint: Fall


Stated Complaint: fall


Time Seen by Provider: 20 10:34


Source: patient, family, RN notes reviewed, old records reviewed


Mode of arrival: wheelchair





- History of Present Illness


Initial Comments: 





Patient is an 85-year-old male presents emergency department today for 

evaluation with complaints of fall 4 days ago.  Patient reports that he is 

standing up taking his socks off when he fell hitting his head and neck in the 

left side of his body from the bathroom floor and in the bathtub.  Patient 

states that he has pain with range of motion of his left arm and shoulder.  He 

also complained of pain and unable to ambulate due to left hip pain.  Patient 

reports that he is not on blood thinners besides baby aspirin.  He has been 

sitting on the couch since he fell and does live in assisted living facility.  

He reports he's been unable to get up to go to the bathroom and has been taking 

in a cup by his bed.  Patient's daughter went to check on him today and was 

informed that he had a fall 4 days ago decided bring him to the ER for 

evaluation.  Patient's daughter reports it seems to be somewhat more confused.





- Related Data


                                Home Medications











 Medication  Instructions  Recorded  Confirmed


 


Cholecalciferol [Vitamin D3 (25 1,000 unit PO DAILY 01/21/15 12/23/20





Mcg = 1000 Iu)]   


 


Citalopram Hydrobromide [CeleXA] 30 mg PO DAILY 01/21/15 12/23/20


 


lisinopriL [Zestril] 10 mg PO DAILY 01/21/15 12/23/20


 


Multivitamins, Thera [Multivitamin 1 tab PO DAILY 02/10/19 12/23/20





(formulary)]   


 


Aspirin EC [Ecotrin Low Dose] 81 mg PO DAILY 20


 


Cyanocobalamin (Vitamin B-12) 1,000 mcg PO DAILY 20





[Vitamin B-12]   


 


Fenofibrate Nanocrystallized 145 mg PO DAILY 20





[Fenofibrate]   











                                    Allergies











Allergy/AdvReac Type Severity Reaction Status Date / Time


 


adhesive tape Allergy  Rash/Hives Verified 20 11:02














Review of Systems


ROS Statement: 


Those systems with pertinent positive or pertinent negative responses have been 

documented in the HPI.





ROS Other: All systems not noted in ROS Statement are negative.





Past Medical History


Past Medical History: CVA/TIA, Hyperlipidemia, Hypertension, Osteoarthritis (OA)


Additional Past Medical History / Comment(s): tia, alcoholic, freq falls. past 

lt humeral/lt femur fx, kidney stone 40 plus years ago. at times tremors, irreg 

sleep pattern


History of Any Multi-Drug Resistant Organisms: None Reported


Past Surgical History: Hernia Repair, Joint Replacement, Orthopedic Surgery


Additional Past Surgical History / Comment(s): SHOULDER LEFT 25 YEARS AGO, lt 

hip hemiarthroplasty


Past Anesthesia/Blood Transfusion Reactions: No Reported Reaction


Past Psychological History: Depression


Smoking Status: Current every day smoker


Past Alcohol Use History: Daily


Past Drug Use History: None Reported





- Past Family History


  ** Mother


Family Medical History: Hypertension





  ** Father


Additional Family Medical History / Comment(s):  in [facility] pt was 12 years

 old





General Exam





- General Exam Comments


Initial Comments: 





85-year-old male.  Alert and oriented.


Limitations: no limitations


General appearance: alert, in no apparent distress


Head exam: Present: atraumatic, normocephalic, normal inspection


Eye exam: Present: normal appearance, PERRL, EOMI.  Absent: scleral icterus, 

conjunctival injection, periorbital swelling


ENT exam: Present: normal exam, mucous membranes dry, mucous membranes moist


Neck exam: Present: normal inspection.  Absent: tenderness, meningismus, 

lymphadenopathy


Respiratory exam: Present: normal lung sounds bilaterally.  Absent: respiratory 

distress, wheezes, rales, rhonchi, stridor


Cardiovascular Exam: Present: regular rate, normal rhythm, normal heart sounds. 

 Absent: systolic murmur, diastolic murmur, rubs, gallop, clicks


GI/Abdominal exam: Present: soft, normal bowel sounds.  Absent: distended, 

tenderness, guarding, rebound, rigid


Extremities exam: Present: full ROM, normal capillary refill.  Absent: 

tenderness, pedal edema, joint swelling, calf tenderness


  ** Left


Upper Arm exam: Absent: normal inspection, full ROM (Patient has tenderness over

 the left clavicle.  Pain with any abduction or extension of the left arm.  Scar

 from previous surgeries noted.)


Elbow exam: Present: normal inspection, full ROM


Forearm Wrist exam: Present: full ROM.  Absent: normal inspection (Pt has 

history of Deputreyen contracture)


Hand Wrist exam: Present: normal inspection, full ROM


Neuro motor exam: Present: wrist extension intact, thumb opposition intact, 

thumb IP flexion intact, thumb adduction intact, fingers 2-5 abduction intact


Vascular: Present: normal capillary refill


  ** Left


Upper Leg exam: Present: normal inspection, full ROM, tenderness (Patient has 

tenderness over the left greater trochanter mid shaft of the thigh.)


Knee exam: Present: normal inspection, full ROM


Lower Leg exam: Present: normal inspection, full ROM


Ankle exam: Present: normal inspection, full ROM


Foot/Toe exam: Present: normal inspection, full ROM


Back exam: Present: normal inspection


Neurological exam: Present: alert, oriented X3, CN II-XII intact


Psychiatric exam: Present: normal affect, normal mood


Skin exam: Present: warm, dry, intact, normal color.  Absent: rash





Course


                                   Vital Signs











  20





  10:22 11:47


 


Temperature 97.9 F 


 


Pulse Rate 100 80


 


Respiratory 18 18





Rate  


 


Blood Pressure 143/84 158/104


 


O2 Sat by Pulse 94 L 94 L





Oximetry  














Medical Decision Making





- Medical Decision Making





85-year-old male smoker living in assisted living facility presents after a fall

 4 days ago while standing taking his sock off.  Since that time he is unable to

 ambulate due to left hip pain.  He has previous left hip prosthesis performed 

by Dr. Callaway as well as left shoulder fracture repair with Dr. Nash.  He has

 some tenderness over the clavicle and computed tomography scan shows a mid 

clavicle fracture.  X-ray of the pelvis shows evidence of the left greater 

trochanter fracture near prosthesis.  With patient's age and less likely a 

candidate for surgery through prosthesis I did discuss the case with Kassandra 

Patient who discussed with Dr. Nash.  Patient can be admitted if he is unable 

to ambulate with nonweightbearing capacities at his assisted living facility and

 can admit the Patient to the hospital for placement to rehab.  Patient's 

daughter was informed of these results and is okay with treatment plan.





- Lab Data


Result diagrams: 


                                 20 11:04





                                 20 11:04


                                   Lab Results











  20 Range/Units





  11:04 11:04 11:04 


 


WBC  10.5    (3.8-10.6)  k/uL


 


RBC  5.09    (4.30-5.90)  m/uL


 


Hgb  15.2    (13.0-17.5)  gm/dL


 


Hct  45.8    (39.0-53.0)  %


 


MCV  90.0    (80.0-100.0)  fL


 


MCH  29.8    (25.0-35.0)  pg


 


MCHC  33.1    (31.0-37.0)  g/dL


 


RDW  14.1    (11.5-15.5)  %


 


Plt Count  364    (150-450)  k/uL


 


MPV  8.1    


 


Neutrophils %  79    %


 


Lymphocytes %  11    %


 


Monocytes %  7    %


 


Eosinophils %  1    %


 


Basophils %  1    %


 


Neutrophils #  8.4 H    (1.3-7.7)  k/uL


 


Lymphocytes #  1.1    (1.0-4.8)  k/uL


 


Monocytes #  0.7    (0-1.0)  k/uL


 


Eosinophils #  0.1    (0-0.7)  k/uL


 


Basophils #  0.1    (0-0.2)  k/uL


 


PT   9.7   (9.0-12.0)  sec


 


INR   0.9   (<1.2)  


 


APTT   26.9   (22.0-30.0)  sec


 


Sodium    144  (137-145)  mmol/L


 


Potassium    3.9  (3.5-5.1)  mmol/L


 


Chloride    107  ()  mmol/L


 


Carbon Dioxide    25  (22-30)  mmol/L


 


Anion Gap    12  mmol/L


 


BUN    38 H  (9-20)  mg/dL


 


Creatinine    1.04  (0.66-1.25)  mg/dL


 


Est GFR (CKD-EPI)AfAm    77  (>60 ml/min/1.73 sqM)  


 


Est GFR (CKD-EPI)NonAf    66  (>60 ml/min/1.73 sqM)  


 


Glucose    187 H  (74-99)  mg/dL


 


Calcium    10.6 H  (8.4-10.2)  mg/dL


 


Total Bilirubin    1.3  (0.2-1.3)  mg/dL


 


AST    31  (17-59)  U/L


 


ALT    17  (4-49)  U/L


 


Alkaline Phosphatase    42  ()  U/L


 


Troponin I     (0.000-0.034)  ng/mL


 


Total Protein    8.3 H  (6.3-8.2)  g/dL


 


Albumin    4.8  (3.5-5.0)  g/dL














  20 Range/Units





  11:04 


 


WBC   (3.8-10.6)  k/uL


 


RBC   (4.30-5.90)  m/uL


 


Hgb   (13.0-17.5)  gm/dL


 


Hct   (39.0-53.0)  %


 


MCV   (80.0-100.0)  fL


 


MCH   (25.0-35.0)  pg


 


MCHC   (31.0-37.0)  g/dL


 


RDW   (11.5-15.5)  %


 


Plt Count   (150-450)  k/uL


 


MPV   


 


Neutrophils %   %


 


Lymphocytes %   %


 


Monocytes %   %


 


Eosinophils %   %


 


Basophils %   %


 


Neutrophils #   (1.3-7.7)  k/uL


 


Lymphocytes #   (1.0-4.8)  k/uL


 


Monocytes #   (0-1.0)  k/uL


 


Eosinophils #   (0-0.7)  k/uL


 


Basophils #   (0-0.2)  k/uL


 


PT   (9.0-12.0)  sec


 


INR   (<1.2)  


 


APTT   (22.0-30.0)  sec


 


Sodium   (137-145)  mmol/L


 


Potassium   (3.5-5.1)  mmol/L


 


Chloride   ()  mmol/L


 


Carbon Dioxide   (22-30)  mmol/L


 


Anion Gap   mmol/L


 


BUN   (9-20)  mg/dL


 


Creatinine   (0.66-1.25)  mg/dL


 


Est GFR (CKD-EPI)AfAm   (>60 ml/min/1.73 sqM)  


 


Est GFR (CKD-EPI)NonAf   (>60 ml/min/1.73 sqM)  


 


Glucose   (74-99)  mg/dL


 


Calcium   (8.4-10.2)  mg/dL


 


Total Bilirubin   (0.2-1.3)  mg/dL


 


AST   (17-59)  U/L


 


ALT   (4-49)  U/L


 


Alkaline Phosphatase   ()  U/L


 


Troponin I  0.027  (0.000-0.034)  ng/mL


 


Total Protein   (6.3-8.2)  g/dL


 


Albumin   (3.5-5.0)  g/dL














20 11:05


EKG performed at 1102 shows normal sinus rhythm with premature atrial complexes.

  Ventricular rate of 92 bpm.





Normal is 136 ms.  QS duration is 94 ms.  QT QTc is 380/479 ms.





- Radiology Data


Radiology results: report reviewed


Shoulder x-ray shows postoperative changes involving the left humerus.





X-ray of the left hip shows appears to be a lucency on the greater trochanteric 

sending to the hip prosthesis which was not seen on prior exam and suspicious 

for acute fracture.  Nondisplaced.  Best seen on the oblique view.  Correlate 

for point tenderness.  There is foreshortening of the right femoral neck which 

may be positional.  Patient has symptoms related to the right femoral neck 

correlate with computed tomography scan pelvis.





Chest x-ray shows no definite acute process.





CT of the brain and C-spine showed no acute fracture dislocation evident 

cervical spine.  No acute intracranial hemorrhage mass effect or midline shift 

is seen.  Stable brain CT.  There is a medial left clavicle fracture which is 

impacted minimally displaced.





Disposition


Clinical Impression: 


 Clavicle fracture, Hip fracture, Smoker, Unable to ambulate





Disposition: ADMITTED AS IP TO THIS HOSP


Condition: Stable


Is patient prescribed a controlled substance at d/c from ED?: No


Referrals: 


José Manuel Flores DO [Primary Care Provider] - 1-2 days


Time of Disposition: 12:59

## 2020-12-23 NOTE — P.HPOR
History of Present Illness


H&P Date: 20


This patient is an 85-year-old male with history of hypertension, 

hyperlipidemia, CVA/TIA, and who is a current every day smoker that presented to

[facility] emergency department today with his daughter for complaints 

of left hip pain and left shoulder pain following a fall 4 days ago.  The p

atient states he lives in an assisted living home.  He states he fell about 4 

days ago onto his left side.  He states he has been unable to ambulate due to 

left hip pain following this fall.  He was brought to the emergency department 

today by his daughter.  X-rays in the emergency department on the left hip 

revealed a nondisplaced periprosthetic fracture of the greater trochanter.  

Computed tomography scan of the head and cervical spine revealed no acute 

abnormality, although there was an impacted, minimally displaced medial clavicle

fracture noted on CT.  Patient was admitted under the care of Dr. Nash for 

possible rehab placement of a consult placed to internal medicine for medical 

management.


At the time of my exam, the patient is complaining of isolated left hip pain and

left shoulder pain.  He states his pain is currently well-controlled.  He denies

chest pain or shortness of breath.  He denies numbness or tingling of the left 

upper extremity left lower extremity.  He has no additional complaints at this 

time.  Vital signs are currently stable. 








Past Medical History


Past Medical History: CVA/TIA, Hyperlipidemia, Hypertension, Osteoarthritis (OA)


Additional Past Medical History / Comment(s): tia, alcoholic, freq falls. past 

lt humeral/lt femur fx, kidney stone 40 plus years ago. at times tremors, irreg 

sleep pattern


History of Any Multi-Drug Resistant Organisms: None Reported


Past Surgical History: Hernia Repair, Joint Replacement, Orthopedic Surgery


Additional Past Surgical History / Comment(s): SHOULDER LEFT 25 YEARS AGO, lt 

hip hemiarthroplasty


Past Anesthesia/Blood Transfusion Reactions: No Reported Reaction


Past Psychological History: Depression


Additional Psychological History / Comment(s): pt lives at [facility]


Smoking Status: Current every day smoker


Past Alcohol Use History: Daily


Additional Past Alcohol Use History / Comment(s): Patient is a smoker of 3 packs

per day since he was 14 years of age.  He is drinking a 12 pack of beer per day 

for a long period of time. Rare use now residing at [facility]


Past Drug Use History: None Reported





- Past Family History


  ** Mother


Family Medical History: Hypertension





  ** Father


Additional Family Medical History / Comment(s):  [facility] was 12 years

old





Medications and Allergies


                                Home Medications











 Medication  Instructions  Recorded  Confirmed  Type


 


Cholecalciferol [Vitamin D3 (25 1,000 unit PO DAILY 01/21/15 12/23/20 History





Mcg = 1000 Iu)]    


 


Citalopram Hydrobromide [CeleXA] 30 mg PO DAILY 01/21/15 12/23/20 History


 


lisinopriL [Zestril] 10 mg PO DAILY 01/21/15 12/23/20 History


 


Multivitamins, Thera [Multivitamin 1 tab PO DAILY 02/10/19 12/23/20 History





(formulary)]    


 


Aspirin EC [Ecotrin Low Dose] 81 mg PO DAILY 20 History


 


Cyanocobalamin (Vitamin B-12) 1,000 mcg PO DAILY 20 History





[Vitamin B-12]    


 


Fenofibrate Nanocrystallized 145 mg PO DAILY 20 History





[Fenofibrate]    








                                    Allergies











Allergy/AdvReac Type Severity Reaction Status Date / Time


 


adhesive tape Allergy  Rash/Hives Verified 20 11:02














Physical Examination


At the time of my exam, the patient is lying in bed in no apparent distress.  He

is alert and oriented 3.  His head appears normocephalic and atraumatic.  His 

breathing appears nonlabored.  On inspection of the left shoulder and clavicle, 

there are no open wounds or lacerations. Sling in place on the left upper 

extremity.  There is a healed scar of the anterior shoulder.  There is pain on 

palpation of the medial clavicle.  There is no pain on palpation of the distal 

clavicle, humerus, elbow, forearm, wrist, hand.  Range of motion of left 

shoulder is not tested at this time. No pain with passive range of motion of the

left elbow or wrist.  Motor and sensory function appear intact of the left upper

extremity.  Radial pulse palpable, the left upper extremity is warm and well-

perfused.





On inspection of the left hip, there are no open wounds or lacerations.  Skin is

intact.  There is moderate pain on palpation of the greater trochanter.  Range 

of motion of the hip is not tested at this time.  There is no pain on palpation 

of the left knee, lower leg, ankle, foot. Patient has good strength and range of

motion of the left ankle.  Motor and sensory function are intact of the left 

lower extremity.  Dorsalis pedis pulse palpable, the left lower extremity is 

warm and well-perfused with brisk capillary refill distally.





Lower extremity compression cuffs are in place bilaterally.  Calves are soft and

nontender to palpation bilaterally.





On inspection of the right upper extremity and the right lower extremity, there 

are no obvious deformities or signs of trauma.








Results


Left hip x-ray 20: Non-displaced periprosthetic fracture at the greater 

trochanter. Hemiarthroplasty in good position of the left femur. 


 Left shoulder x-ray 2020: No acute fractures. IM humeral nail in place. 


 CT head and cervical spine 2020: Per report, no acute fractures or 

dislocations at the C-spine.  No acute abnormalities.  Impacted, minimally 

displaced medial clavicle fracture. 








- Labs


Labs: 


                  Abnormal Lab Results - Last 24 Hours (Table)











  20 Range/Units





  11:04 11:04 16:51 


 


Neutrophils #  8.4 H    (1.3-7.7)  k/uL


 


BUN   38 H   (9-20)  mg/dL


 


Glucose   187 H   (74-99)  mg/dL


 


POC Glucose (mg/dL)    101 H  (75-99)  mg/dL


 


Calcium   10.6 H   (8.4-10.2)  mg/dL


 


Total Protein   8.3 H   (6.3-8.2)  g/dL








                                      H & H











  20 Range/Units





  11:04 


 


Hgb  15.2  (13.0-17.5)  gm/dL


 


Hct  45.8  (39.0-53.0)  %








                                   Coagulation











  20 Range/Units





  11:04 


 


INR  0.9  (<1.2)  











Result Diagrams: 


                                 20 11:04





                                 20 11:04





Assessment and Plan


Assessment: 


Non-displaced left periprosthetic fracture of the left greater trochanter 


 Left minimally displaced medial clavicle fracture








Plan: 


- The clinical and imaging findings were discussed with the patient.  Patient 

was discussed with Dr. Nash.  No surgical intervention is planned for the left 

nondisplaced periprosthetic greater trochanter fracture, or the medial left 

clavicle fracture. 


 - He is to remain toe-touch weightbearing on the left lower extremity.  Up with

assistance, up with a walker.  Physical therapy for gait and balance training.


 - He should keep the sling in place on the left upper extremity for his 

clavicle fracture.


 - Pain management as needed.


 - Case management consulted for rehab placement. Internal medicine consulted fo

r medical management.

## 2020-12-23 NOTE — XR
EXAMINATION TYPE: XR shoulder complete LT

 

DATE OF EXAM: 12/23/2020

 

COMPARISON: NONE

 

HISTORY: Pain

 

TECHNIQUE: Three views are submitted.

 

FINDINGS:

Diffuse osteopenia. Postoperative change involving the left humerus. Chronic deformity of the left cl
avicle. No acute fracture.

 

IMPRESSION:

1. Postoperative change involving the left humerus

## 2020-12-23 NOTE — XR
EXAMINATION TYPE: XR chest 1V

 

DATE OF EXAM: 12/23/2020

 

COMPARISON: 8/8/2018

 

HISTORY: Pain post fall

 

TECHNIQUE: Single frontal view of the chest is obtained.

 

FINDINGS:  Postsurgical change left shoulder with chronic deformity of the left clavicle. Arthropathy
 of the right clavicle with a chronic rib deformity suspected on the right. The lungs are clear. 
ezekiel rib deformity on the left. Atherosclerotic change aorta. No pneumothorax or overt failure.

 

IMPRESSION:  

1. No definite acute process.

## 2020-12-23 NOTE — XR
EXAMINATION TYPE: XR clavicle LT

 

DATE OF EXAM: 12/23/2020

 

COMPARISON: Shoulder x-ray 1/20/2015

 

HISTORY: Shoulder pain

 

TECHNIQUE: 2 views

 

FINDINGS: There is deformity of the lateral and of the clavicle related to old ununited fracture. I s
ee no acute fracture. There is intramedullary zhang in the proximal left humerus. There is no dislocati
on.

 

IMPRESSION: No acute fracture seen. 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 No change in position compared to
 old exam.

## 2020-12-23 NOTE — CT
EXAMINATION TYPE: CT brain cspine wo con

 

DATE OF EXAM: 12/23/2020

 

COMPARISON: Prior head and cervical spine CT dated 2/10/2019

 

HISTORY: Fall on 12/19, confusion

 

CT DLP: 1311.9 mGycm

Automated exposure control for dose reduction was used.

 

TECHNIQUE: CT scan of the head and cervical spine are performed without contrast.

 

FINDINGS:   There is no acute intracranial hemorrhage, mass effect, or midline shift identified.  The
 ventricles and sulci are within normal limits in size. Periventricular white matter shows patchy low
 attenuation. There is cortical atrophy. The globes are intact and the visualized sinuses are clear.

 

Cervical spine is visualized in its entirety from C1 through upper thoracic levels and demonstrates s
table alignment without evidence of acute fracture or dislocation. Degenerative disc changes are agai
n noted, there is multilevel spondylosis, foraminal encroachment, is loss of disc height signal, mini
mal anterolisthesis grade 1 C3-4, vacuum phenomenon at intervertebral levels C3-4, C4-5 and C5-6. Pos
sible old gustavo 's fracture is well-corticated, shows nonunion. Prevertebral soft tissue appea
rs within normal limits.  The C1-C2 articulation is unremarkable.

 

The medial left clavicle shows impacted, minimally displaced fracture. Lung apices show emphysematous
 change.  

 

IMPRESSION:

1. There is no acute fracture or dislocation evident in the cervical spine.

2. No acute intracranial hemorrhage, mass effect, or midline shift is seen. Stable brain CT

3. Medial left clavicular fracture

## 2020-12-23 NOTE — XR
EXAMINATION TYPE: XR Hip LT and AP Pelvis

 

DATE OF EXAM: 12/23/2020

 

COMPARISON: 10/10/2016

 

HISTORY: Pain

 

TECHNIQUE: A single AP view of the pelvis is obtained. Two views of the right hip are obtained.  

 

FINDINGS:  There is postsurgical changes involving the left hip. Diffuse osteopenia. Degenerative gianni
nge lower lumbar spine. Vascular calcifications noted. There is a lucency through the greater trochan
ter of the left hip. Extends to the level of the prostheses.

 

IMPRESSION:

1. There appears to be a lucency along the greater trochanter extending to the hip prostheses which w
as not seen on the prior exam and suspicious for acute fracture. Nondisplaced. Best seen on the obliq
ue view. Correlate with point tenderness for confirmation.

2. Foreshortening of the right femoral neck may be positional. If the patient has symptoms related to
 the right femoral neck correlate with CT scan pelvis.

## 2020-12-24 VITALS
TEMPERATURE: 97.6 F | SYSTOLIC BLOOD PRESSURE: 96 MMHG | RESPIRATION RATE: 14 BRPM | HEART RATE: 95 BPM | DIASTOLIC BLOOD PRESSURE: 64 MMHG

## 2020-12-24 LAB
PH UR: 6.5 [PH] (ref 5–8)
SP GR UR: 1.02 (ref 1–1.03)
UROBILINOGEN UR QL STRIP: 3 MG/DL (ref ?–2)

## 2020-12-24 RX ADMIN — CEFAZOLIN SCH MLS/HR: 330 INJECTION, POWDER, FOR SOLUTION INTRAMUSCULAR; INTRAVENOUS at 10:24

## 2020-12-24 NOTE — P.DS
Providers


Date of admission: 


12/23/20 13:52





Expected date of discharge: 12/24/20


Attending physician: 


CRISTY Nash





Consults: 





                                        





12/23/20 13:00


Consult Physician Stat 


   Consulting Provider: Meño Neely


   Consult Reason/Comments: hip fracture, non weight bearing, rehab placement


   Do you want consulting provider notified?: Yes











Primary care physician: 


[facility] Course: 


This patient is an 85-year-old male with history of hypertension, 

hyperlipidemia, CVA/TIA, and who is a current every day smoker that presented to

[facility] emergency department today with his daughter for complaints 

of left hip pain and left shoulder pain following a fall. X-rays in the 

emergency department on the left hip revealed a nondisplaced periprosthetic 

fracture of the greater trochanter.  Computed tomography scan of the head and 

cervical spine revealed no acute abnormality, although there was an impacted, 

minimally displaced medial clavicle fracture noted on CT. Non-surgical treatment

was recommended for the femur and clavicle fracture, although there was concern 

from his daughter about the patient being discharged from the ER, as he lives at

[facility], and does not have help. Therefore, he was admitted under the 

care of Dr. Nash with a consult to physical therapy for rehab placement. 

Consult was also placed to internal medicine for medical management. 





Patient is seen and examined bedside this morning. Patient states the pain in 

his left hip and clavicle are well-controlled, he currently is not having pain. 

Patient has been working with physical therapy this morning.  Patient overall 

has no complaints.  He denies chest pain, shortness breath, nausea, vomiting, 

fevers, chills.  He denies numbness or tingling of the left upper extremity or 

the left lower extremity. 





At the time of my exam, the patient is lying in bed in no apparent distress.  He

is alert and oriented 3. 


On inspection of the left hip, there is no erythema, ecchymosis, skin 

discoloration.  No open wounds or lacerations.  Mild pain on palpation of the 

greater trochanter.  Range of motion of the left hip is not tested at this time.

 Motor and sensory function are intact of the left lower extremity.  Dorsalis 

pedis pulse +2, both lower extremities warm and well-perfused with brisk 

capillary refill distally.  Nontender to palpation.


On inspection of the left upper extremity, the extremity is currently in a 

sling.  The left upper extremity is warm and well-perfused with brisk capillary 

refill distally.  Motor and sensory function are intact of the left upper 

extremity.





Patient is discharged to rehab today, patient has been cleared by internal 

medicine for discharge today.  Patient will follow up in the office in 1 week 

for repeat x-rays of the left hip and the left clavicle. 














Patient Condition at Discharge: Stable





Plan - Discharge Summary


New Discharge Prescriptions: 


Continue


   Cholecalciferol [Vitamin D3 (25 Mcg = 1000 Iu)] 1,000 unit PO DAILY


   lisinopriL [Zestril] 10 mg PO DAILY


   Citalopram Hydrobromide [CeleXA] 30 mg PO DAILY


   Multivitamins, Thera [Multivitamin (formulary)] 1 tab PO DAILY


   Fenofibrate Nanocrystallized [Fenofibrate] 145 mg PO DAILY


   Aspirin EC [Ecotrin Low Dose] 81 mg PO DAILY


   Cyanocobalamin (Vitamin B-12) [Vitamin B-12] 1,000 mcg PO DAILY


Discharge Medication List





Cholecalciferol [Vitamin D3 (25 Mcg = 1000 Iu)] 1,000 unit PO DAILY 01/21/15 

[History]


Citalopram Hydrobromide [CeleXA] 30 mg PO DAILY 01/21/15 [History]


lisinopriL [Zestril] 10 mg PO DAILY 01/21/15 [History]


Multivitamins, Thera [Multivitamin (formulary)] 1 tab PO DAILY 02/10/19 

[History]


Aspirin EC [Ecotrin Low Dose] 81 mg PO DAILY 12/23/20 [History]


Cyanocobalamin (Vitamin B-12) [Vitamin B-12] 1,000 mcg PO DAILY 12/23/20 

[History]


Fenofibrate Nanocrystallized [Fenofibrate] 145 mg PO DAILY 12/23/20 [History]








Follow up Appointment(s)/Referral(s): 


José Manuel Flores DO [Primary Care Provider] - 1-2 days


CRISTY Nash DO [Doctor of Osteopathic Medicine] - 1 Week


Activity/Diet/Wound Care/Special Instructions: 


Toe-touch weight bearing on left lower extremity. Up with assistance, up with a 

walker. 


Keep sling on when out of bed. 


Follow-up in the office in 1 week for repeat x-rays. 


Discharge Disposition: TRANSFER TO SNF/ECF

## 2020-12-24 NOTE — P.CNPUL
History of Present Illness


Consult date: 20 (Medical management)


Chief complaint: Medical management


History of present illness: 





This is a 85-year-old male with prior medical history hypertension dyslipidemia 

history of stroke, patient does have a history of smoking and nicotine use, carson

ent came into the hospital with left hip pain and left shoulder pain he has a 

fall about 4 days ago, he is a resident of [facility], patient has 

been uncomfortable, patient underwent computed tomography scan and x-rays in 

emergency department, x-ray of the left hip revealed nondisplaced periprosthetic

fracture of greater greater trochanter computed tomography scan of the head and 

C-spine negative however medially displaced medial clavicle fracture was seen 

patient was evaluated by Dr. Nash, and denies any other active issues except 

the pain in shoulder and hip, shin is hemodynamically stable,  patient is being 

continued home medications,





Review of Systems


All systems: negative





Past Medical History


Past Medical History: CVA/TIA, Hyperlipidemia, Hypertension, Osteoarthritis (OA)


Additional Past Medical History / Comment(s): tia, alcoholic, freq falls. past 

lt humeral/lt femur fx, kidney stone 40 plus years ago. at times tremors, irreg 

sleep pattern


History of Any Multi-Drug Resistant Organisms: None Reported


Past Surgical History: Hernia Repair, Joint Replacement, Orthopedic Surgery


Additional Past Surgical History / Comment(s): SHOULDER LEFT 25 YEARS AGO, lt 

hip hemiarthroplasty


Past Anesthesia/Blood Transfusion Reactions: No Reported Reaction


Past Psychological History: Depression


Additional Psychological History / Comment(s): pt lives at [facility]


Smoking Status: Current every day smoker


Past Alcohol Use History: Daily


Additional Past Alcohol Use History / Comment(s): Patient is a smoker of 3 packs

per day since he was 14 years of age.  He is drinking a 12 pack of beer per day 

for a long period of time. Rare use now residing at [facility]


Past Drug Use History: None Reported





- Past Family History


  ** Mother


Family Medical History: Hypertension





  ** Father


Additional Family Medical History / Comment(s):  [facility] pt was 12 years

old





Medications and Allergies


                                Home Medications











 Medication  Instructions  Recorded  Confirmed  Type


 


Cholecalciferol [Vitamin D3 (25 1,000 unit PO DAILY 01/21/15 12/23/20 History





Mcg = 1000 Iu)]    


 


Citalopram Hydrobromide [CeleXA] 30 mg PO DAILY 01/21/15 12/23/20 History


 


lisinopriL [Zestril] 10 mg PO DAILY 01/21/15 12/23/20 History


 


Multivitamins, Thera [Multivitamin 1 tab PO DAILY 02/10/19 12/23/20 History





(formulary)]    


 


Aspirin EC [Ecotrin Low Dose] 81 mg PO DAILY 20 History


 


Cyanocobalamin (Vitamin B-12) 1,000 mcg PO DAILY 20 History





[Vitamin B-12]    


 


Fenofibrate Nanocrystallized 145 mg PO DAILY 20 History





[Fenofibrate]    








                                    Allergies











Allergy/AdvReac Type Severity Reaction Status Date / Time


 


adhesive tape Allergy  Rash/Hives Verified 20 11:02














Physical Exam


Vitals: 


                                   Vital Signs











  Temp Pulse Pulse Resp BP BP Pulse Ox


 


 20 07:45  97.7 F   76    145/79  91 L


 


 20 02:30  97.6 F   81  18   164/67  94 L


 


 20 22:30    75  18   


 


 20 20:00  97.3 F L   75  18   152/91  93 L


 


 20 19:30     17   


 


 20 15:11     16   


 


 20 14:50  976 F H   76  16   157/76  94 L


 


 20 14:13  97.6 F  72   18  173/93   95








                                Intake and Output











 20





 22:59 06:59 14:59


 


Other:   


 


  Voiding Method Urinal  Urinal


 


  # Voids  1 














- Constitutional


General appearance: average body habitus, disheveled





- EENT


Eyes: PERRLA


ENT: hard of hearing


Ears: bilateral: normal





- Neck


Neck: normal ROM


Carotids: bilateral: upstroke normal





- Respiratory


Respiratory: bilateral: CTA





- Cardiovascular


Rhythm: regular


Heart sounds: normal: S1, S2





- Gastrointestinal


General gastrointestinal: normal bowel sounds, soft





- Neurologic


Neurologic: CNII-XII intact





- Musculoskeletal


Musculoskeletal: generalized weakness, strength equal bilaterally





- Psychiatric


Psychiatric: A&O x's 3, appropriate affect, intact judgment & insight





Results





- Laboratory Findings


CBC and BMP: 


                                 20 11:04





                                 20 11:04


PT/INR, D-dimer











PT  9.7 sec (9.0-12.0)   20  11:04    


 


INR  0.9  (<1.2)   20  11:04    








Abnormal lab findings: 


                                  Abnormal Labs











  20





  11:04 11:04 16:51


 


Neutrophils #  8.4 H  


 


BUN   38 H 


 


Glucose   187 H 


 


POC Glucose (mg/dL)    101 H


 


Calcium   10.6 H 


 


Total Protein   8.3 H 


 


Urine Protein   














  20





  07:35


 


Neutrophils # 


 


BUN 


 


Glucose 


 


POC Glucose (mg/dL) 


 


Calcium 


 


Total Protein 


 


Urine Protein  Trace H














Assessment and Plan


Assessment: 


Nondisplaced left periprosthetic fracture of left greater trochanter





Minimally displaced medial clavicle or fracture





Shoulder and hip pain due to above





Status post fall





History of CVA





Dyslipidemia





Potential hypertensive cardiovascular disease





Osteoarthritis





Smoking and nicotine abuse





Resident of extended care facility





Plan: 





Patient has been cleared from medical standpoint for the discharge will proceed 

into the discharge reconciliation


Time with Patient: Greater than 30

## 2020-12-27 LAB
GLUCOSE BLD-MCNC: 113 MG/DL (ref 75–99)
GLUCOSE BLD-MCNC: 85 MG/DL (ref 75–99)
GLUCOSE BLD-MCNC: 95 MG/DL (ref 75–99)

## 2021-06-08 ENCOUNTER — HOSPITAL ENCOUNTER (INPATIENT)
Dept: HOSPITAL 47 - EC | Age: 86
LOS: 4 days | Discharge: HOME HEALTH | DRG: 177 | End: 2021-06-12
Attending: HOSPITALIST | Admitting: HOSPITALIST
Payer: MEDICARE

## 2021-06-08 VITALS — BODY MASS INDEX: 22.1 KG/M2

## 2021-06-08 DIAGNOSIS — Z87.442: ICD-10-CM

## 2021-06-08 DIAGNOSIS — J15.6: Primary | ICD-10-CM

## 2021-06-08 DIAGNOSIS — R32: ICD-10-CM

## 2021-06-08 DIAGNOSIS — M19.90: ICD-10-CM
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
## 2021-06-08 DIAGNOSIS — J44.1: ICD-10-CM

## 2021-06-08 DIAGNOSIS — Z91.048: ICD-10-CM
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
## 2021-06-08 DIAGNOSIS — F02.80: ICD-10-CM

## 2021-06-08 DIAGNOSIS — I11.9: ICD-10-CM

## 2021-06-08 DIAGNOSIS — E86.0: ICD-10-CM

## 2021-06-08 DIAGNOSIS — Z91.81: ICD-10-CM

## 2021-06-08 DIAGNOSIS — R25.1: ICD-10-CM

## 2021-06-08 DIAGNOSIS — M41.9: ICD-10-CM

## 2021-06-08 DIAGNOSIS — Z87.19: ICD-10-CM

## 2021-06-08 DIAGNOSIS — E53.8: ICD-10-CM

## 2021-06-08 DIAGNOSIS — F32.9: ICD-10-CM

## 2021-06-08 DIAGNOSIS — F17.210: ICD-10-CM

## 2021-06-08 DIAGNOSIS — M48.061: ICD-10-CM

## 2021-06-08 DIAGNOSIS — R62.7: ICD-10-CM

## 2021-06-08 DIAGNOSIS — J44.0: ICD-10-CM

## 2021-06-08 DIAGNOSIS — Z82.49: ICD-10-CM

## 2021-06-08 DIAGNOSIS — M47.812: ICD-10-CM

## 2021-06-08 DIAGNOSIS — R29.6: ICD-10-CM

## 2021-06-08 DIAGNOSIS — Z96.642: ICD-10-CM

## 2021-06-08 DIAGNOSIS — G30.9: ICD-10-CM

## 2021-06-08 DIAGNOSIS — Z79.899: ICD-10-CM

## 2021-06-08 DIAGNOSIS — M47.817: ICD-10-CM

## 2021-06-08 DIAGNOSIS — Z20.822: ICD-10-CM

## 2021-06-08 DIAGNOSIS — Z87.81: ICD-10-CM

## 2021-06-08 DIAGNOSIS — Z86.73: ICD-10-CM

## 2021-06-08 DIAGNOSIS — F10.27: ICD-10-CM

## 2021-06-08 DIAGNOSIS — G93.41: ICD-10-CM

## 2021-06-08 DIAGNOSIS — G89.29: ICD-10-CM

## 2021-06-08 DIAGNOSIS — N32.0: ICD-10-CM

## 2021-06-08 DIAGNOSIS — E44.0: ICD-10-CM

## 2021-06-08 DIAGNOSIS — Z98.890: ICD-10-CM

## 2021-06-08 DIAGNOSIS — G72.1: ICD-10-CM

## 2021-06-08 DIAGNOSIS — G91.2: ICD-10-CM

## 2021-06-08 DIAGNOSIS — Z71.41: ICD-10-CM

## 2021-06-08 DIAGNOSIS — R33.9: ICD-10-CM

## 2021-06-08 DIAGNOSIS — Z79.82: ICD-10-CM

## 2021-06-08 DIAGNOSIS — E78.5: ICD-10-CM

## 2021-06-08 LAB
ALBUMIN SERPL-MCNC: 4.7 G/DL (ref 3.5–5)
ALP SERPL-CCNC: 48 U/L (ref 38–126)
ALT SERPL-CCNC: 18 U/L (ref 4–49)
ANION GAP SERPL CALC-SCNC: 9 MMOL/L
APTT BLD: 24.6 SEC (ref 22–30)
AST SERPL-CCNC: 51 U/L (ref 17–59)
BASOPHILS # BLD AUTO: 0 K/UL (ref 0–0.2)
BASOPHILS NFR BLD AUTO: 0 %
BUN SERPL-SCNC: 36 MG/DL (ref 9–20)
CALCIUM SPEC-MCNC: 10 MG/DL (ref 8.4–10.2)
CHLORIDE SERPL-SCNC: 110 MMOL/L (ref 98–107)
CK SERPL-CCNC: 680 U/L (ref 55–170)
CO2 SERPL-SCNC: 26 MMOL/L (ref 22–30)
EOSINOPHIL # BLD AUTO: 0 K/UL (ref 0–0.7)
EOSINOPHIL NFR BLD AUTO: 0 %
ERYTHROCYTE [DISTWIDTH] IN BLOOD BY AUTOMATED COUNT: 4.7 M/UL (ref 4.3–5.9)
ERYTHROCYTE [DISTWIDTH] IN BLOOD: 14.7 % (ref 11.5–15.5)
GLUCOSE SERPL-MCNC: 147 MG/DL (ref 74–99)
HCT VFR BLD AUTO: 41.9 % (ref 39–53)
HGB BLD-MCNC: 13.6 GM/DL (ref 13–17.5)
INR PPP: 1 (ref ?–1.2)
LYMPHOCYTES # SPEC AUTO: 0.9 K/UL (ref 1–4.8)
LYMPHOCYTES NFR SPEC AUTO: 9 %
MAGNESIUM SPEC-SCNC: 1.8 MG/DL (ref 1.6–2.3)
MCH RBC QN AUTO: 28.9 PG (ref 25–35)
MCHC RBC AUTO-ENTMCNC: 32.5 G/DL (ref 31–37)
MCV RBC AUTO: 89 FL (ref 80–100)
MONOCYTES # BLD AUTO: 0.5 K/UL (ref 0–1)
MONOCYTES NFR BLD AUTO: 5 %
NEUTROPHILS # BLD AUTO: 8.5 K/UL (ref 1.3–7.7)
NEUTROPHILS NFR BLD AUTO: 84 %
PH UR: 7.5 [PH] (ref 5–8)
PLATELET # BLD AUTO: 365 K/UL (ref 150–450)
POTASSIUM SERPL-SCNC: 4.2 MMOL/L (ref 3.5–5.1)
PROT SERPL-MCNC: 7.6 G/DL (ref 6.3–8.2)
PROT UR QL: (no result)
PT BLD: 10.5 SEC (ref 9–12)
SODIUM SERPL-SCNC: 145 MMOL/L (ref 137–145)
SP GR UR: 1.02 (ref 1–1.03)
UROBILINOGEN UR QL STRIP: <2 MG/DL (ref ?–2)
WBC # BLD AUTO: 10.1 K/UL (ref 3.8–10.6)
WBC # UR AUTO: 4 /HPF (ref 0–5)

## 2021-06-08 PROCEDURE — 36415 COLL VENOUS BLD VENIPUNCTURE: CPT

## 2021-06-08 PROCEDURE — 84145 PROCALCITONIN (PCT): CPT

## 2021-06-08 PROCEDURE — 80053 COMPREHEN METABOLIC PANEL: CPT

## 2021-06-08 PROCEDURE — 93005 ELECTROCARDIOGRAM TRACING: CPT

## 2021-06-08 PROCEDURE — 95816 EEG AWAKE AND DROWSY: CPT

## 2021-06-08 PROCEDURE — 94760 N-INVAS EAR/PLS OXIMETRY 1: CPT

## 2021-06-08 PROCEDURE — 82550 ASSAY OF CK (CPK): CPT

## 2021-06-08 PROCEDURE — 96374 THER/PROPH/DIAG INJ IV PUSH: CPT

## 2021-06-08 PROCEDURE — 72125 CT NECK SPINE W/O DYE: CPT

## 2021-06-08 PROCEDURE — 84443 ASSAY THYROID STIM HORMONE: CPT

## 2021-06-08 PROCEDURE — 96375 TX/PRO/DX INJ NEW DRUG ADDON: CPT

## 2021-06-08 PROCEDURE — 71046 X-RAY EXAM CHEST 2 VIEWS: CPT

## 2021-06-08 PROCEDURE — 85025 COMPLETE CBC W/AUTO DIFF WBC: CPT

## 2021-06-08 PROCEDURE — 99285 EMERGENCY DEPT VISIT HI MDM: CPT

## 2021-06-08 PROCEDURE — 83735 ASSAY OF MAGNESIUM: CPT

## 2021-06-08 PROCEDURE — 72131 CT LUMBAR SPINE W/O DYE: CPT

## 2021-06-08 PROCEDURE — 85730 THROMBOPLASTIN TIME PARTIAL: CPT

## 2021-06-08 PROCEDURE — 80320 DRUG SCREEN QUANTALCOHOLS: CPT

## 2021-06-08 PROCEDURE — 81001 URINALYSIS AUTO W/SCOPE: CPT

## 2021-06-08 PROCEDURE — 94640 AIRWAY INHALATION TREATMENT: CPT

## 2021-06-08 PROCEDURE — 51798 US URINE CAPACITY MEASURE: CPT

## 2021-06-08 PROCEDURE — 85610 PROTHROMBIN TIME: CPT

## 2021-06-08 PROCEDURE — 84484 ASSAY OF TROPONIN QUANT: CPT

## 2021-06-08 PROCEDURE — 96361 HYDRATE IV INFUSION ADD-ON: CPT

## 2021-06-08 PROCEDURE — 87635 SARS-COV-2 COVID-19 AMP PRB: CPT

## 2021-06-08 PROCEDURE — 84134 ASSAY OF PREALBUMIN: CPT

## 2021-06-08 PROCEDURE — 72100 X-RAY EXAM L-S SPINE 2/3 VWS: CPT

## 2021-06-08 PROCEDURE — 70450 CT HEAD/BRAIN W/O DYE: CPT

## 2021-06-08 PROCEDURE — 72170 X-RAY EXAM OF PELVIS: CPT

## 2021-06-08 RX ADMIN — BUDESONIDE SCH MG: 1 SUSPENSION RESPIRATORY (INHALATION) at 19:40

## 2021-06-08 RX ADMIN — CEFAZOLIN SCH MLS/HR: 330 INJECTION, POWDER, FOR SOLUTION INTRAMUSCULAR; INTRAVENOUS at 12:21

## 2021-06-08 RX ADMIN — Medication SCH MG: at 20:05

## 2021-06-08 RX ADMIN — FOLIC ACID SCH MG: 1 TABLET ORAL at 20:05

## 2021-06-08 RX ADMIN — ENOXAPARIN SODIUM SCH MG: 40 INJECTION SUBCUTANEOUS at 20:05

## 2021-06-08 RX ADMIN — NICOTINE SCH PATCH: 21 PATCH, EXTENDED RELEASE TRANSDERMAL at 20:05

## 2021-06-08 RX ADMIN — FORMOTEROL FUMARATE DIHYDRATE SCH MCG: 20 SOLUTION RESPIRATORY (INHALATION) at 19:47

## 2021-06-08 RX ADMIN — FENOFIBRATE SCH MG: 160 TABLET ORAL at 20:52

## 2021-06-08 RX ADMIN — IPRATROPIUM BROMIDE AND ALBUTEROL SULFATE SCH ML: .5; 3 SOLUTION RESPIRATORY (INHALATION) at 19:40

## 2021-06-08 NOTE — XR
EXAM TYPE: LUMBAR SPINE X RAY SERIES

 

COMPARISON: NONE

 

HISTORY: Pain

 

TECHNIQUE: 3 views submitted views are submitted.

 

FINDINGS:

Alignment is anatomic.  The pedicles are intact.  The transverse processes are intact.  There is scol
iotic curvature with hypertrophic and degenerative changes. Vascular calcifications are seen. There i
s multilevel severe degenerative disc disease with superior endplate compression fractures of T12 and
 L1 of indeterminate age.

 

IMPRESSION:

1. Scoliosis with multilevel severe degenerative disc disease.

2. There is superior endplate compression fractures of indeterminate age T12 and L1 correlate clinica
lly.

## 2021-06-08 NOTE — XR
EXAMINATION TYPE: XR pelvis AP view

 

DATE OF EXAM: 6/8/2021

 

COMPARISON: 10/10/2016

 

HISTORY: Fall, pain

 

TECHNIQUE: AP pelvis

 

FINDINGS: There is a large fecal bolus at the rectum. Stable appearing bone island is in the symphysi
s pubis. Left hip prosthesis is present.

 

Sacroiliac joints are patent. Femoral head articulates with the acetabulum on right. Some joint space
 narrowing is present. No acute fractures are identified.

 

IMPRESSION:

1.  No acute posttraumatic changes

## 2021-06-08 NOTE — CT
EXAMINATION TYPE: CT lumbar spine wo con

 

DATE OF EXAM: 6/8/2021

 

COMPARISON: Radiographs 6/8/2021

 

HISTORY: 86-year-old male pain after fall, abnormal lumbar xray

 

TECHNIQUE: Contiguous axial scanning of the lumbar spine without IV contrast. Coronal and sagittal re
constructions performed.

 

CT DLP: 667.4 mGycm

Automated exposure control for dose reduction was used.

 

 

FINDINGS: 

Left hip arthroplasty. Degenerated dextro convex scoliosis of the lumbar spine.

 

Tiny hiatal hernia. Prostatic calcifications throughout the abdominal aorta and iliac arteries. Sigmo
id diverticulosis. Severe distention of the urinary bladder is partially visualized. Unable to exclud
e some abnormal mural base thickening along the right posterior aspect of the bladder, partially visu
alized, reference axial image 89.

 

Hypertrophic facet arthropathy throughout. Moderate multilevel degenerative disc disease.

 

Superior endplate deformities of T12 and L1 have a chronic appearance. No paravertebral soft tissue h
ematoma. No retropulsion into the spinal canal.

 

Disc bulge at L1-L2 mildly narrows the spinal canal. Additional disc bulge and ligamentum flavum thic
kening at L4-L5 mildly narrows the spinal canal.

 

No nic canal compromise identified by CT.

 

On the left, there is moderate neuroforaminal narrowing at L1-L2, L2-L3, L3-L4, L4-L5, mild at L5-S1.


 

On the right, there is moderate to severe neuroforaminal narrowing at L4-L5 and L5-S1, moderate at L3
-L4.

 

 

 

IMPRESSION: 

 

1. SUPERIOR ENDPLATE DEFORMITIES OF T12 AND L1 HAVE A CHRONIC APPEARANCE. NO RETROPULSION INTO THE SP
INAL CANAL. NO PARAVERTEBRAL HEMATOMA OR SOFT TISSUE SWELLING. CLINICAL CORRELATION RECOMMENDED.

2. DEGENERATED EXTRA CONVEX SCOLIOSIS. MILD SPINAL CANAL STENOSES AT L1-L2 AND L4-L5. NO HIGH-GRADE C
ANAL COMPROMISE APPRECIATED BY CT.

3. VARIABLE MODERATE NEUROFORAMINAL STENOSES AS OUTLINED ABOVE, MODERATE TO SEVERE ON THE RIGHT AT L4
-L5 AND L5-S1.

4. NOTE, PARTIALLY VISUALIZED SEVERE DISTENTION OF THE URINARY BLADDER. CORRELATE FOR BLADDER OUTLET 
OBSTRUCTION/URINARY RETENTION. IN ADDITION, THERE MAY BE SOME ABNORMAL UROTHELIAL THICKENING ALONG TH
E LEFT POSTERIOR ASPECT OF THE BLADDER, AXIAL IMAGE 89. RELATE WITH URINE CYTOLOGY AND URINALYSIS. DI
RECT VISUALIZATION CAN BE CONSIDERED DEPENDING ON RESULTS.

## 2021-06-08 NOTE — P.HPIM
History of Present Illness


H&P Date: 21


Chief Complaint: Found on the bathroom floor





History of presenting complaint:


This is a 86-year-old patient who follows with Dr. Flores.  Patient has a 

history of frequent falls.  Patient lives in an assisted living where he is 

normally helped with his medications.  The ventilator given his medications this

morning and he was found on the bathroom floor.  They believe he was there from 

8 PM the previous night.  He reported that he had fallen because brakes went out

of his cart.  Denied reporting hitting his head.  Patient normally AO 3 but was

found to be slightly confused.  Was unable to get up.  EMS had to help him out. 

Patient has chronic low back pain.  Has a rather congested cough.  Shortness of 

breath.  Some wheezing.  Patient is not a good historian.  Patient has a long-

standing history of drinking alcohol or smoking.  Has had prior admissions with 

fall.





Review of systems:


GEN.:  Tired


EYES: None


HEENT: None


NECK: None


RESPIRATORY: Short of breath cough congested


CARDIOVASCULAR: None


GASTROINTESTINAL: None


GENITOURINARY: None


MUSCULOSKELETAL: Generalized muscle weakness


LYMPHATICS: None


HEMATOLOGICAL: None  


PSYCHIATRY: None


NEUROLOGICAL: No focal weakness.





Past medical history to include:


COPD, depression, osteoarthritis, chronic alcohol dependence, cigarette smoking,

hypertension, hyperlipidemia, irregular sleep pattern,





Social history:


Lives at [facility].  Long-standing history of 

alcohol intake and also smoking cigarettes.  Has a walker





Physical examination:


VITAL SIGNS: 98.6, 94, 22, 1 40 x 1 03, 92% on room air-upon presentation]


GENERAL: BMI 22.2, muscle muscle mass and subcutaneous fat slightly confused.


EYES: Pupils equal.  Conjunctiva normal.


HEENT: External appearance of nose and ears normal, oral cavity dry.


NECK: JVD not raised; masses not palpable.


HEART: First and second heart sounds are normal;  no edema.  


LUNGS: Respiratory rate increased; decreased breath sound some basal crackles.  


ABDOMEN: Soft,  nontender, liver spleen not palpable, no masses palpable.  


PSYCH: [Only able to answer some questions, altered sensorium  


MUSCULAR skeletal: Decreased muscle mass


NEUROLOGICAL: Cranial nerves grossly intact; no facial asymmetry,   power and 

sensation grossly intact. 


LYMPHATICS: No lymph nodes palpable in the axilla and neck





INVESTIGATIONS, reviewed in the clinical context:


WBC 10.1 hemoglobin 13.6 platelets 365 potassium 4.2 creatinine 0.97


Blood glucose 147.  Serum alcohol less than 10 Coronavirus [PCF]: Not detected


Chest x-ray film personally reviewed by me-hyperinflation.  Possible infiltrate


CT lumbar spine: Supple.  Endplate deformity of T12-L1, scoliosis, neuro 

following last stenosis, bladder distention


Computed tomography scan of the brain: Mild to moderate hydrocephalus, spondylo-

lytic changes








Assessment and plan:





-Fall secondary to acute medical debility on chronic medical debility 

multifactorial.  Patient has alcoholic myopathy, nutritional myopathy.  Fall 

precautions.  Bedrest





-Pneumonia, suspect gram-negative organism


IV ceftriaxone





-Acute delirium and metabolic encephalopathy


Rule out seizures.  We'll do an EEG





-Alcoholic and nutritional myopathy


PTOT





-Acute COPD exacerbation, and a current smoker


DuoNeb, inhaled steroids and long-acting beta agonist





-Chronic B12 deficiency


-Vitamin B12 supplement





-Essential hypertension


Continue with Zestril





-Chronic nicotine dependence


Nicotine patch





-Assess for cognitive impairment and patient is most stable.


Differential includes alcohol-induced dementia, normal pressure hydrocephalus, 

B12 deficiency,





Fall precautions, bedrest, EEG, IV ceftriaxone, nebulized bronchodilators 

steroids, PTOT.  Consult .


Given the complexity and severity of patient's condition expect the patient to 

be in the hospital at least for 2 overnights





Past Medical History


Past Medical History: CVA/TIA, Hyperlipidemia, Hypertension, Osteoarthritis (OA)


Additional Past Medical History / Comment(s): tia, alcoholic, freq falls. past 

lt humeral/lt femur fx, kidney stone 40 plus years ago. at times tremors, irreg 

sleep pattern


History of Any Multi-Drug Resistant Organisms: None Reported


Past Surgical History: Hernia Repair, Joint Replacement, Orthopedic Surgery


Additional Past Surgical History / Comment(s): SHOULDER LEFT 25 YEARS AGO, lt 

hip hemiarthroplasty


Past Anesthesia/Blood Transfusion Reactions: No Reported Reaction


Past Psychological History: Depression


Smoking Status: Current every day smoker


Past Alcohol Use History: Daily


Past Drug Use History: None Reported





- Past Family History


  ** Mother


Family Medical History: Hypertension





  ** Father


Additional Family Medical History / Comment(s):  [facility] [place] was 12 years

 old





Medications and Allergies


                                Home Medications











 Medication  Instructions  Recorded  Confirmed  Type


 


Citalopram Hydrobromide [CeleXA] 30 mg PO DAILY 01/21/15 06/08/21 History


 


lisinopriL [Zestril] 10 mg PO DAILY 01/21/15 06/08/21 History


 


Multivitamins, Thera [Multivitamin 1 tab PO DAILY 02/10/19 06/08/21 History





(formulary)]    


 


Aspirin EC [Ecotrin Low Dose] 81 mg PO DAILY 20 History


 


Cyanocobalamin (Vitamin B-12) 1,000 mcg PO DAILY 20 History





[Vitamin B-12]    


 


Fenofibrate Nanocrystallized 145 mg PO HS 20 History





[Fenofibrate]    


 


Acetaminophen Tab [Tylenol Tab] 500 - 1,000 mg PO Q8H PRN MDD 3gm 21 History


 


Cholecalciferol [Vitamin D3 (25 25 mcg PO DAILY 21 History





Mcg = 1000 Iu)]    


 


Ibuprofen [Motrin] 800 mg PO DAILY PRN 21 History








                                    Allergies











Allergy/AdvReac Type Severity Reaction Status Date / Time


 


adhesive tape Allergy  Rash/Hives Verified 21 10:30














Physical Exam


Vitals: 


                                   Vital Signs











  Temp Pulse Resp BP Pulse Ox


 


 21 14:15   84  18  136/81  96


 


 21 12:26   92  22  139/87  96


 


 21 11:33   93  20  176/100  95


 


 21 10:15   95  20  189/109  95


 


 21 09:06  98.6 F  94  22  140/103  92 L








                                Intake and Output











 21





 06:59 14:59 22:59


 


Output Total  906 


 


Balance  -906 


 


Output:   


 


  Post Void Residual  906 


 


Other:   


 


  Weight  68.039 kg 














Results


CBC & Chem 7: 


                                 21 09:27





                                 21 09:27


Labs: 


                  Abnormal Lab Results - Last 24 Hours (Table)











  21 Range/Units





  09:27 09:27 09:27 


 


Neutrophils #  8.5 H    (1.3-7.7)  k/uL


 


Lymphocytes #  0.9 L    (1.0-4.8)  k/uL


 


Chloride    110 H  ()  mmol/L


 


BUN    36 H  (9-20)  mg/dL


 


Glucose    147 H  (74-99)  mg/dL


 


Creatine Kinase    680 H  ()  U/L


 


Urine Protein   1+ H   (Negative)  


 


Amorphous Sediment   Occasional H   (None)  /hpf


 


Urine Mucus   Rare H   (None)  /hpf

## 2021-06-08 NOTE — XR
EXAMINATION TYPE: XR chest 2V

 

DATE OF EXAM: 6/8/2021

 

COMPARISON: NONE

 

TECHNIQUE: PA and lateral views submitted.

 

HISTORY: Weakness and chest pain

 

FINDINGS:

The lungs are clear and  there is no pneumothorax, pleural effusion, or focal pneumonia.  There are c
hronic appearing rib fractures on the right. Diffuse osteopenia and postsurgical change left humerus.
 Hyperinflation suggests COPD. Heart size normal. No overt failure. Atherosclerotic change aorta. Chr
onic appearing left-sided rib deformities also suggested. Degenerative change of the spine.

 

IMPRESSION: 

1. COPD with bilateral chronic appearing rib deformities..

## 2021-06-08 NOTE — CT
EXAMINATION TYPE: CT brain cspine wo con

 

DATE OF EXAM: 6/8/2021

 

COMPARISON: 12/23/2020

 

HISTORY: 86-year-old male with pain after Fall

 

CT DLP: 1362.4 mGycm

Automated exposure control for dose reduction was used.

 

Technique:  Examination of the head was done in axial plane without intravenous contrast. Coronal and
 sagittal reconstructions performed.

 

CT of the cervical spine was obtained in axial plane without intravenous injection of  contrast mater
ial.  Coronal and sagittal reformatted images were obtained from the axial views for evaluation of  f
ractures, spinal alignment and canal.

 

 

FINDINGS:

 

Head:

There is no evidence of  acute intracranial hemorrhage, acute ischemic changes, mass, mass-effect, or
 extra-axial fluid collection.  There is no effacement of cerebral sulci or basal subarachnoid cister
ns. There is no midline shift.  Gray-white matter distinction is preserved.

 

Rightward septal deviation. Mild mucosal thickening ethmoid air cells. Orbits and globes are intact.

 

Moderate generalized supratentorial volume loss. Mild to moderate hydrocephalus with Todd's ratio of 
0.43 stable to minimally increased from 12/23/2020.

 

Moderate confluent white matter hypodensities in both cerebral hemispheres.

 

 

Cervical spine:

Some septal lines in the visualized upper lungs. Background of at least moderate emphysema. Patchy ch
anges within the left upper lobe are new from 12/23/2020.

 

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 Some heterotopic ossification along the posterio
r midline at the C4-C5 level is unchanged.

 

Moderately advanced disc/endplate degenerative changes especially from C4 through C7 levels.

 

Degenerative grade 1 anterolisthesis C3-C4 and grade 1 retrolisthesis C5-C6.

 

Hypertrophic facet and uncovertebral joint arthropathy, greater on the left.

 

No acute fracture of the cervical spine.

 

Variable moderate bilateral neuroforaminal stenosis

 

Sagittal and coronal reformatted images confirm above findings.

 

 

COMBINED IMPRESSION:

1. Mild to moderate hydrocephalus which may in part relate to central cerebral atrophy. This is relat
ively stable to minimally progressed from 12/23/2020. Correlate for a component of NPH. Confluent bur
den of chronic small vessel ischemic disease.

 

2. No acute intracranial abnormality seen.

 

3. No acute fracture of the cervical spine. 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 Mode
rate to advanced spondylotic change with degenerative grade 1 spondylolisthesis C3-C4 and C5-C6.

 

4. Chest radiograph can further assess changes of COPD, upper lung septal lines, and possible develop
ing infiltrate in the left upper lobe. CHF versus pneumonia are considerations.

## 2021-06-08 NOTE — ED
General Adult HPI





- General


Chief complaint: Fall


Stated complaint: fall


Time Seen by Provider: 21 09:12


Source: patient, EMS


Mode of arrival: EMS


Limitations: no limitations





- History of Present Illness


Initial comments: 





86-year-old male with a past medical history of TIA, hyperlipidemia, 

hypertension, alcoholism, frequent falls presents to the emergency room for 

chief complaint of fall.  Patient apparently lives in an assisted living 

facility where they are able to help him with his medications.  They report that

they went to give him his medications this morning and he was found in the 

bathroom.  Patient believes he was there since about 8:00 PM last night.  He 

reports that he fell because of his walker.  He denies loss of consciousness.  

He does not believe he hit his head.  He was unable to get up off the ground.  

When EMS arrived he was a total two-person assist because of weakness.  He 

stated he was able to bear weight on his legs.  Patient is complaining of low 

back pain but states that is largely chronic in nature.Patient has no other 

complaints at this time including shortness of breath, chest pain, abdominal 

pain, nausea or vomiting, headache, or visual changes.





- Related Data


                                Home Medications











 Medication  Instructions  Recorded  Confirmed


 


Citalopram Hydrobromide [CeleXA] 30 mg PO DAILY 01/21/15 12/23/20


 


lisinopriL [Zestril] 10 mg PO DAILY 01/21/15 12/23/20


 


Multivitamins, Thera [Multivitamin 1 tab PO DAILY 02/10/19 12/23/20





(formulary)]   


 


Aspirin EC [Ecotrin Low Dose] 81 mg PO DAILY 20


 


Cyanocobalamin (Vitamin B-12) 1,000 mcg PO DAILY 20





[Vitamin B-12]   


 


Fenofibrate Nanocrystallized 145 mg PO DAILY 20





[Fenofibrate]   


 


Acetaminophen Tab [Tylenol Tab] 500 - 1,000 mg PO Q8H PRN MDD 3gm 21


 


Cholecalciferol [Vitamin D3 (25 25 mcg PO DAILY 21





Mcg = 1000 Iu)]   


 


Ibuprofen [Motrin] 800 mg PO DAILY PRN 21











                                    Allergies











Allergy/AdvReac Type Severity Reaction Status Date / Time


 


adhesive tape Allergy  Rash/Hives Verified 21 10:30














Review of Systems


ROS Statement: 


Those systems with pertinent positive or pertinent negative responses have been 

documented in the HPI.





ROS Other: All systems not noted in ROS Statement are negative.





Past Medical History


Past Medical History: CVA/TIA, Hyperlipidemia, Hypertension, Osteoarthritis (OA)


Additional Past Medical History / Comment(s): tia, alcoholic, freq falls. past 

lt humeral/lt femur fx, kidney stone 40 plus years ago. at times tremors, irreg 

sleep pattern


History of Any Multi-Drug Resistant Organisms: None Reported


Past Surgical History: Hernia Repair, Joint Replacement, Orthopedic Surgery


Additional Past Surgical History / Comment(s): SHOULDER LEFT 25 YEARS AGO, lt 

hip hemiarthroplasty


Past Anesthesia/Blood Transfusion Reactions: No Reported Reaction


Past Psychological History: Depression


Smoking Status: Current every day smoker


Past Alcohol Use History: Daily


Past Drug Use History: None Reported





- Past Family History


  ** Mother


Family Medical History: Hypertension





  ** Father


Additional Family Medical History / Comment(s):  [place] [facility] [place] was 12 years

 old





General Exam


Limitations: no limitations


General appearance: alert, in no apparent distress


Head exam: Present: atraumatic, normocephalic, normal inspection


Eye exam: Present: normal appearance, PERRL, EOMI.  Absent: periorbital swelling


ENT exam: Present: normal exam, mucous membranes moist


Neck exam: Present: normal inspection, full ROM.  Absent: tenderness


Respiratory exam: Present: normal lung sounds bilaterally.  Absent: respiratory 

distress, wheezes


Cardiovascular Exam: Present: regular rate, normal rhythm, normal heart sounds


GI/Abdominal exam: Present: soft, normal bowel sounds.  Absent: distended, 

tenderness, guarding, rebound, rigid


Extremities exam: Present: full ROM (full ROM of BL hips), normal capillary 

refill (cap refill < 2 seconds), other (no tenderness or evidence of external 

trauma of upper or lower extremities.)


Back exam: Present: vertebral tenderness (upper lumbar spine tenderness )





Course


                                   Vital Signs











  21





  09:06 10:15 11:33


 


Temperature 98.6 F  


 


Pulse Rate 94 95 93


 


Respiratory 22 20 20





Rate   


 


Blood Pressure 140/103 189/109 176/100


 


O2 Sat by Pulse 92 L 95 95





Oximetry   














EKG Findings





- EKG Comments:


EKG Findings:: SR, Pr int 160, Pr int 160, 





Medical Decision Making





- Medical Decision Making





Vitals are stable.Patient does have some mild lumbar tenderness.  No other 

injuries noted.  CBC is unremarkable.  CMP shows mild dehydration, patient was 

given fluids.  Creatine kinase mildly elevated 680 likely from lying on the 

floor throughout the night.  Urinalysis is unremarkable.  Alcohol is negative.  

Patient is a past alcoholic and denies drinking any alcoholic beverages 

recently.  CT brain showed mild to moderate hydrocephalus which may in part 

related to cerebral atrophy.  Relatively stable from 2020.  No other acute

 cranial abnormality seen.  No acute fracture of the cervical spine.  Chest x-

ray does show COPD with bilateral chronic-appearing rib deformities.  On CT of 

the neck they did see some opacities in the lungs which could be related to 

pneumonia or CHF, pt denies fevers but does admit to cough and weakness, will be

 treated. Pelvis x-ray shows no acute posttraumatic changes.  Lumbar spine does 

show superior endplate compression fractures of indeterminate age of T12 and L1.

  Daughter does not think the patient has never had these in the past.  Patient 

does have tenderness in this area.  CT was ordered. CT lumbar spine showed patel

perior endplate deformities to have a chronic appearance.  No retropulsion.  

This is likely not acute.  Of note patient also has a severely distended 

bladder.  We did do a bladder scan and 900 mL's was seen post void.  Therefore 

Morgan catheter was placed.  Patient will be admitted for weakness, failure to 

thrive, debility, pneumonia.  He will likely require rehabilitation as he has in

 the past.





- Lab Data


Result diagrams: 


                                 21 09:27





                                 21 09:27


                                   Lab Results











  21 Range/Units





  09:27 09:27 09: 


 


WBC  10.1    (3.8-10.6)  k/uL


 


RBC  4.70    (4.30-5.90)  m/uL


 


Hgb  13.6    (13.0-17.5)  gm/dL


 


Hct  41.9    (39.0-53.0)  %


 


MCV  89.0    (80.0-100.0)  fL


 


MCH  28.9    (25.0-35.0)  pg


 


MCHC  32.5    (31.0-37.0)  g/dL


 


RDW  14.7    (11.5-15.5)  %


 


Plt Count  365    (150-450)  k/uL


 


MPV  7.9    


 


Neutrophils %  84    %


 


Lymphocytes %  9    %


 


Monocytes %  5    %


 


Eosinophils %  0    %


 


Basophils %  0    %


 


Neutrophils #  8.5 H    (1.3-7.7)  k/uL


 


Lymphocytes #  0.9 L    (1.0-4.8)  k/uL


 


Monocytes #  0.5    (0-1.0)  k/uL


 


Eosinophils #  0.0    (0-0.7)  k/uL


 


Basophils #  0.0    (0-0.2)  k/uL


 


PT   10.5   (9.0-12.0)  sec


 


INR   1.0   (<1.2)  


 


APTT   24.6   (22.0-30.0)  sec


 


Sodium     (137-145)  mmol/L


 


Potassium     (3.5-5.1)  mmol/L


 


Chloride     ()  mmol/L


 


Carbon Dioxide     (22-30)  mmol/L


 


Anion Gap     mmol/L


 


BUN     (9-20)  mg/dL


 


Creatinine     (0.66-1.25)  mg/dL


 


Est GFR (CKD-EPI)AfAm     (>60 ml/min/1.73 sqM)  


 


Est GFR (CKD-EPI)NonAf     (>60 ml/min/1.73 sqM)  


 


Glucose     (74-99)  mg/dL


 


Calcium     (8.4-10.2)  mg/dL


 


Magnesium     (1.6-2.3)  mg/dL


 


Total Bilirubin     (0.2-1.3)  mg/dL


 


AST     (17-59)  U/L


 


ALT     (4-49)  U/L


 


Alkaline Phosphatase     ()  U/L


 


Creatine Kinase     ()  U/L


 


Troponin I     (0.000-0.034)  ng/mL


 


Total Protein     (6.3-8.2)  g/dL


 


Albumin     (3.5-5.0)  g/dL


 


TSH     (0.465-4.680)  mIU/L


 


Urine Color    Yellow  


 


Urine Appearance    Cloudy  (Clear)  


 


Urine pH    7.5  (5.0-8.0)  


 


Ur Specific Gravity    1.021  (1.001-1.035)  


 


Urine Protein    1+ H  (Negative)  


 


Urine Glucose (UA)    Negative  (Negative)  


 


Urine Ketones    Negative  (Negative)  


 


Urine Blood    Negative  (Negative)  


 


Urine Nitrite    Negative  (Negative)  


 


Urine Bilirubin    Negative  (Negative)  


 


Urine Urobilinogen    <2.0  (<2.0)  mg/dL


 


Ur Leukocyte Esterase    Negative  (Negative)  


 


Urine WBC    4  (0-5)  /hpf


 


Amorphous Sediment    Occasional H  (None)  /hpf


 


Urine Mucus    Rare H  (None)  /hpf


 


Serum Alcohol     mg/dL


 


Coronavirus (PCR)     (Not Detectd)  














  21 Range/Units





  09:27 09:27 09:27 


 


WBC     (3.8-10.6)  k/uL


 


RBC     (4.30-5.90)  m/uL


 


Hgb     (13.0-17.5)  gm/dL


 


Hct     (39.0-53.0)  %


 


MCV     (80.0-100.0)  fL


 


MCH     (25.0-35.0)  pg


 


MCHC     (31.0-37.0)  g/dL


 


RDW     (11.5-15.5)  %


 


Plt Count     (150-450)  k/uL


 


MPV     


 


Neutrophils %     %


 


Lymphocytes %     %


 


Monocytes %     %


 


Eosinophils %     %


 


Basophils %     %


 


Neutrophils #     (1.3-7.7)  k/uL


 


Lymphocytes #     (1.0-4.8)  k/uL


 


Monocytes #     (0-1.0)  k/uL


 


Eosinophils #     (0-0.7)  k/uL


 


Basophils #     (0-0.2)  k/uL


 


PT     (9.0-12.0)  sec


 


INR     (<1.2)  


 


APTT     (22.0-30.0)  sec


 


Sodium  145    (137-145)  mmol/L


 


Potassium  4.2    (3.5-5.1)  mmol/L


 


Chloride  110 H    ()  mmol/L


 


Carbon Dioxide  26    (22-30)  mmol/L


 


Anion Gap  9    mmol/L


 


BUN  36 H    (9-20)  mg/dL


 


Creatinine  0.97    (0.66-1.25)  mg/dL


 


Est GFR (CKD-EPI)AfAm  83    (>60 ml/min/1.73 sqM)  


 


Est GFR (CKD-EPI)NonAf  72    (>60 ml/min/1.73 sqM)  


 


Glucose  147 H    (74-99)  mg/dL


 


Calcium  10.0    (8.4-10.2)  mg/dL


 


Magnesium  1.8    (1.6-2.3)  mg/dL


 


Total Bilirubin  0.9    (0.2-1.3)  mg/dL


 


AST  51    (17-59)  U/L


 


ALT  18    (4-49)  U/L


 


Alkaline Phosphatase  48    ()  U/L


 


Creatine Kinase  680 H    ()  U/L


 


Troponin I   0.031   (0.000-0.034)  ng/mL


 


Total Protein  7.6    (6.3-8.2)  g/dL


 


Albumin  4.7    (3.5-5.0)  g/dL


 


TSH  1.850    (0.465-4.680)  mIU/L


 


Urine Color     


 


Urine Appearance     (Clear)  


 


Urine pH     (5.0-8.0)  


 


Ur Specific Gravity     (1.001-1.035)  


 


Urine Protein     (Negative)  


 


Urine Glucose (UA)     (Negative)  


 


Urine Ketones     (Negative)  


 


Urine Blood     (Negative)  


 


Urine Nitrite     (Negative)  


 


Urine Bilirubin     (Negative)  


 


Urine Urobilinogen     (<2.0)  mg/dL


 


Ur Leukocyte Esterase     (Negative)  


 


Urine WBC     (0-5)  /hpf


 


Amorphous Sediment     (None)  /hpf


 


Urine Mucus     (None)  /hpf


 


Serum Alcohol  <10    mg/dL


 


Coronavirus (PCR)    Not Detected  (Not Detectd)  














Disposition


Clinical Impression: 


 Weakness, Cough, Fall, Failure to thrive





Disposition: ADMITTED AS IP TO THIS HOSP


Is patient prescribed a controlled substance at d/c from ED?: No


Referrals: 


José Manuel Flores DO [Primary Care Provider] - 1-2 days


Time of Disposition: 11:51

## 2021-06-09 RX ADMIN — FORMOTEROL FUMARATE DIHYDRATE SCH MCG: 20 SOLUTION RESPIRATORY (INHALATION) at 20:53

## 2021-06-09 RX ADMIN — CITALOPRAM HYDROBROMIDE SCH MG: 10 TABLET ORAL at 08:29

## 2021-06-09 RX ADMIN — FORMOTEROL FUMARATE DIHYDRATE SCH MCG: 20 SOLUTION RESPIRATORY (INHALATION) at 09:00

## 2021-06-09 RX ADMIN — FOLIC ACID SCH MG: 1 TABLET ORAL at 08:29

## 2021-06-09 RX ADMIN — IPRATROPIUM BROMIDE AND ALBUTEROL SULFATE SCH ML: .5; 3 SOLUTION RESPIRATORY (INHALATION) at 08:59

## 2021-06-09 RX ADMIN — IPRATROPIUM BROMIDE AND ALBUTEROL SULFATE SCH ML: .5; 3 SOLUTION RESPIRATORY (INHALATION) at 20:53

## 2021-06-09 RX ADMIN — IPRATROPIUM BROMIDE AND ALBUTEROL SULFATE SCH: .5; 3 SOLUTION RESPIRATORY (INHALATION) at 12:41

## 2021-06-09 RX ADMIN — CYANOCOBALAMIN TAB 500 MCG SCH MCG: 500 TAB at 08:29

## 2021-06-09 RX ADMIN — Medication SCH MCG: at 08:29

## 2021-06-09 RX ADMIN — BUDESONIDE SCH MG: 1 SUSPENSION RESPIRATORY (INHALATION) at 08:59

## 2021-06-09 RX ADMIN — THERA TABS SCH EACH: TAB at 08:29

## 2021-06-09 RX ADMIN — CEFAZOLIN SCH MLS/HR: 330 INJECTION, POWDER, FOR SOLUTION INTRAMUSCULAR; INTRAVENOUS at 11:15

## 2021-06-09 RX ADMIN — ASPIRIN 81 MG CHEWABLE TABLET SCH MG: 81 TABLET CHEWABLE at 08:29

## 2021-06-09 RX ADMIN — BUDESONIDE SCH MG: 1 SUSPENSION RESPIRATORY (INHALATION) at 20:53

## 2021-06-09 RX ADMIN — Medication SCH MG: at 08:29

## 2021-06-09 RX ADMIN — NICOTINE SCH PATCH: 21 PATCH, EXTENDED RELEASE TRANSDERMAL at 08:30

## 2021-06-09 RX ADMIN — FENOFIBRATE SCH MG: 160 TABLET ORAL at 21:47

## 2021-06-09 RX ADMIN — ENOXAPARIN SODIUM SCH MG: 40 INJECTION SUBCUTANEOUS at 08:30

## 2021-06-09 RX ADMIN — IPRATROPIUM BROMIDE AND ALBUTEROL SULFATE SCH ML: .5; 3 SOLUTION RESPIRATORY (INHALATION) at 16:44

## 2021-06-09 NOTE — P.CNNES
History of Present Illness


Consult date: 21


Requesting physician: Matt Gonzalez


Reason for Consult: altered sensorium


History of Present Illness: 


This is an 86-year-old gentleman with history of hypertension, hyperlipidemia, 

chronic alcohol use, frequent falls who presented to the emergency department on

2021 for a fall episode that.  Some of the history is obtained from 

medical records.  It seems that the patient lives in assisted living facility 

where they are able to help him with medications.  The staff at the assisted 

living facility were about to give him his AM medication on 2021 and he 

was found on the bathroom.  Patient believes he fell since 8 PM the last night 

prior and it's reported that he felt he fell because of his walker.  He said he 

lost his prior walker and his daughter had to buy another one that did not have 

brakes.  He denies any loss of consciousness per the ED note but per primary 

team it was felt he was found confused by primary team.  But since he fell he 

could not get up off the ground.  As a result EMS was called and he was a 2 

person assist and he stated prior to that he was a able to put weight on his 

legs.  He does state that he has chronic back pain but nothing acute.





I spoke with the patient's daughter (Kathleen) via phone:  She stated the patient 

has been falling episodes for the past 3 years.  He had a fall and as result 

injured left upper extremity and was on the floor for few days and result has 

injury to muscles of the left upper extremity. She denied patient having history

of stroke or TIA to her knowledge.  The patient notified daughter that he had 

urinary accident in last couple months.  His memory has been declining and per 

daughter feels he is having remember known people.  He has severe alcohol use 

for ears and was drinking under the influence but they she stated since being on

the assitant living facility she believes he has not been drinking.  Patient 

feels he is drinking 2 cans of beer per day but daughter stated she buys him 

nonalcoholic drinks.





Some other workup in the hospital consisted of:


Initial vital signs: Blood pressure of 140/103, heart rate of 94, respiratory of

22, temperature of 98.6 Fahrenheit oral and pulse ox of 92% on 2 L of nasal 

cannula.


CT of the head is reported as mild to moderate hydrocephalus which may be in 

part related to central cerebral atrophy.  This is relatively stable to 

minimally progressed from 2020.  Correlate for component of NPH.  

Confluent burden of chronic small vessel ischemic disease.  No acute into her 

intracranial abnormality seen.


While the CT of the cervical spine was reported as no acute fracture of the 

cervical spine.  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  Moderate 

to advanced spondylitic changes with degenerative grade 1 spondylolysis C3 to C4

and C5 to C6.


Chest radiograph can further assess changes of COPD, upper or long septal lines,

and possible developing infiltrate in the left upper lobe.  Congestive heart 

fair versus pneumonia our consideration.


CT lumbar is reported as superior endplate deformities of T12 and L1 have a 

chronic appearance.  No retropulsion into the spinal canal.  No paravertebral 

hematoma or soft tissue swelling.  Clinical correlation recommended.  Degenera

tive extra convex scoliosis.  Mild spinal canal stenosis at L1 to L2 and L4 to 

L5.  No high-grade canal compromise appreciated by the CT.  Variable moderate 

neuroforaminal stenosis at L1 above, moderate to severe on the right at the L4-

L5 and L5-S1.  Note partially visualized severe distention of the urinary 

bladder correlate for bladder outlet obstruction/urinary retention.... 


Initial CBC with differential seems unremarkable. 


Chemistry panel the glucose is 147 and the CK is 680 which is slightly elevated 

otherwise the chemistry panel is unremarkable.


TSH is 1.850 which is normal.  Calcium is 10.0, magnesium is 1.8, AST 51 in the 

ALTs 18 which are all normal.








Review of Systems


Review of system:  The 12 point system was reviewed and apparent positive and 

negative per HPI.








Past Medical History


Past Medical History: CVA/TIA, Hyperlipidemia, Hypertension, Osteoarthritis (OA)


Additional Past Medical History / Comment(s): tia, alcoholic, freq falls. past 

lt humeral/lt femur fx, kidney stone 40 plus years ago. at times tremors, irreg 

sleep pattern


History of Any Multi-Drug Resistant Organisms: None Reported


Past Surgical History: Hernia Repair, Joint Replacement, Orthopedic Surgery


Additional Past Surgical History / Comment(s): SHOULDER LEFT 25 YEARS AGO, lt 

hip hemiarthroplasty


Past Anesthesia/Blood Transfusion Reactions: No Reported Reaction


Past Psychological History: Depression


Additional Psychological History / Comment(s): pt lives at [facility]


Smoking Status: Current every day smoker


Past Alcohol Use History: Daily


Additional Past Alcohol Use History / Comment(s): Patient is a smoker of 3 packs

per day since he was 14 years of age.  He is drinking a 12 pack of beer per day 

for a long period of time. Rare use now residing at [facility]


Past Drug Use History: None Reported





- Past Family History


  ** Mother


Family Medical History: Hypertension





  ** Father


Additional Family Medical History / Comment(s):  in [facility] when pt was 12 years

old





Medications and Allergies


                                Home Medications











 Medication  Instructions  Recorded  Confirmed  Type


 


Citalopram Hydrobromide [CeleXA] 30 mg PO DAILY 01/21/15 06/08/21 History


 


lisinopriL [Zestril] 10 mg PO DAILY 01/21/15 06/08/21 History


 


Multivitamins, Thera [Multivitamin 1 tab PO DAILY 02/10/19 06/08/21 History





(formulary)]    


 


Aspirin EC [Ecotrin Low Dose] 81 mg PO DAILY 20 History


 


Cyanocobalamin (Vitamin B-12) 1,000 mcg PO DAILY 20 History





[Vitamin B-12]    


 


Fenofibrate Nanocrystallized 145 mg PO HS 20 History





[Fenofibrate]    


 


Acetaminophen Tab [Tylenol Tab] 500 - 1,000 mg PO Q8H PRN MDD 3gm 21 History


 


Cholecalciferol [Vitamin D3 (25 25 mcg PO DAILY 21 History





Mcg = 1000 Iu)]    


 


Ibuprofen [Motrin] 800 mg PO DAILY PRN 21 History








                                    Allergies











Allergy/AdvReac Type Severity Reaction Status Date / Time


 


adhesive tape Allergy  Rash/Hives Verified 21 10:30














Physical Examination





- Vital Signs


Vital Signs: 


                                   Vital Signs











  Temp Pulse Pulse Resp BP BP Pulse Ox


 


 21 09:23   77     


 


 21 09:14   86     


 


 21 09:01   86     


 


 21 08:35  98.0 F   81  17   166/89  95


 


 21 02:00  98.2 F   83  18   126/68  91 L


 


 21 19:51   85     


 


 21 19:47   85     


 


 21 19:42   84     


 


 21 19:37  98.6 F  92   18  143/74   96


 


 21 17:30  98.1 F  96   18  117/79   95


 


 21 14:15   84   18  136/81   96


 


 21 12:26   92   22  139/87   96


 


 21 11:33   93   20  176/100   95








                                Intake and Output











 21





 22:59 06:59 14:59


 


Intake Total   210


 


Output Total 1000 1 


 


Balance -1000 -1 210


 


Intake:   


 


  Intake, IV Titration   210





  Amount   


 


    Sodium Chloride 0.9% 1,   160





    000 ml @ 20 mls/hr IV .   





    Q24H [facility] Rx#:[id number]   


 


    cefTRIAXone 1 gm In   50





    Sodium Chloride 0.9% 50   





    ml @ 100 mls/hr IVPB Q12H   





    [facility] Rx#:[id number]   


 


Output:   


 


  Urine 1000  


 


  Stool  1 


 


Other:   


 


  Voiding Method   Indwelling Catheter


 


  Weight 68.039 kg  











GENERAL: The patient is lying in bed and is not in acute distress.


CHEST: The heart rate is regular rate rhythm.   No murmurs to auscultation.  No 

carotid bruit bilaterally.


LUNG: Clear to auscultation bilaterally no wheezing noted throughout.  Not 

labored breathing.


ABDOMEN/GI: Bowel sounds present in all 4 quadrants. No tenderness to palpation 

throughout.





NEUROLOGICAL:


Higher mental function: The patient is awake, alert, oriented to self, place.  

He stated the month is  but the year is .  He is able to identify 

objects such as pen, watch and glasses. .  Patient is following commands.  No 

aphasia and no neglect.


Cranial nerves: The pupils are round, equal and reactive to light and 

accommodation.  Visual fields are full to confrontation throughout.  Extraocular

 movement is intact no nystagmus is noted.  Facial sensation is normal to touch 

throughout.  The facial strength is normal throughout.  Hearing is moderately 

decreased bilaterally to hand rub.  Tongue is midline and moved side-to-side 

without any difficulty.  No dysarthria is noted.  Shoulder shrug is normal 

bilaterally.


Motor: Gait is deferred since he is getting a test.  The strength is spastic 

over the left forearm/hand with atrophy (old per daughter from old injury to 

left upper extremity and not stroke).  Otherwise 5 over 5 throughout.  Normal 

tone and bulk.  


Cerebellum: Normal finger to nose bilaterally.


Sensation: Sensation is normal to touch throughout.


Reflexes (right/left):3++ throughout upper but could not assess lower because of

 his cooperation.


Plantars are downgoing bilaterally. 








Results


Urinalysis is negative for urinary tract infection.


Serum alcohol was less than 10.


Corona virus PCR was not detected








- Laboratory Findings


CBC and BMP: 


                                 21 09:27





                                 21 09:27


Abnormal Lab Findings: 


                                  Abnormal Labs











  21





  09:27 09:27 09:27


 


Neutrophils #  8.5 H  


 


Lymphocytes #  0.9 L  


 


Chloride    110 H


 


BUN    36 H


 


Glucose    147 H


 


Creatine Kinase    680 H


 


Urine Protein   1+ H 


 


Amorphous Sediment   Occasional H 


 


Urine Mucus   Rare H 














Assessment and Plan


Assessment: 








* Altered mental status.  Possibly due to septic encpehalpathy (from underlying 

  suspected pneumonia)--mentation improved


* Recurrent falls.  Seems multifactorial: Due to component of Normal pressure 

  hydrocephalus, chronic Alcohol use, compoenent cervical and lumbosacral 

  spondylosis.


* Seems to have possible Normal pressure hydrocephalus (has 3 year history of 

  falls, in last couple month has urinary incontinence and in last one month 

  some memory loss).


* Old left upper extremity spasticity and atrophy.  He had a old fall leading to

   spasticity and atrophy (per daughter he had a fall and injured left upper 

  extremity and was on floor for days.  She stated he does not have hx of 

  stroke)


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  

  Moderate to advanced spondylitic changes with degenerative grade 1 

  spondylolysis C3 to C4 and C5 to C6)


* Lumbosacral spondylosis (moderate to severe on the right at the L4-L5 and L5-

  S1)


* Suspected hydroneprhrosis


* Hypertension


* Hyperlipidemia


* Chronic Alcohol use (per daughter she stated he has not been drinking to her 

  knowledge in past 2 years)


* Chronic nicotine dependence


Plan: 





* An EEG is ordered by the primary team.  I will not start the patient on 

  antiepileptic drugs since there is no epileptiform discharges or seizure on 

  the EEG.


* I ordered vitamin B12 and folate level.  TSH is 1.850 which is normal.  It is 

  likely the patient has vitamin B12 deficiency and is on vitamin B 12 1000 g 

  daily and the patient is also on folic acid 0.5 mg daily.  Not sure when the 

  patient was given that diagnosis and what was his lab values.


* Patient is on thiamine 100 mg daily and this should be continued.


* PT and OT are consulted.


* Regarding the possible normal pressure hydrocephalus: The patient and his 

  daughter do no want to pursue with any surgical intervention.


* Regarding the cervical and lumbar spondylosis, also patient and his daughter 

  do not want to pursue with any surgical intervention.


* Patient was counseled on continous alcohol cessation.


* The patient was given Ativan 0.5 mg as well as morphine 2 mg once.  Please 

  avoids any sedations/narcotic/opiate that would affect the patient's 

  neurologist on examination as well as it increases the risk of falls.


* We'll defer the rest of the medical measure to the primary team.





The plan is discussed with the patient's daughter (Kathleen via phone) and the 

primary team.


Thank you for the consultation.





Ezio Hsieh M.D.


Neuro-hospitalist








Time with Patient: Greater than 30

## 2021-06-09 NOTE — CDI
Documentation Clarification Form



Date: 06/09/2021 02:28:22 PM

From: Kayleigh Hendrix CCS, CCDS

Phone: [phone]

MRN: [id number]

Admit Date: 06/08/2021 11:58:00 AM

Patient Name: Oneil Ellis

Visit Number: [id number]

Discharge Date:  





ATTENTION: The Clinical Documentation Specialists (CDI) and [facility] Coding Staff 
appreciate your assistance in clarifying documentation. Please respond to the 
clarification below the line at the bottom and electronically sign. The CDI & 
[facility] Coding staff will review the response and follow-up if needed. Please note: 
Queries are made part of the Legal Health Record. If you have any questions, 
please contact the author of this message via ITS.



Dr. Matt Gonzalez:



Failure to thrive is documented in the 6/8 ED Note and Nutritional Myopathy is 
documented in the 6/8 H/P.



Based on this information and the findings below, is there an additional 
diagnosis that is clinically appropriate for this patient?



History/Risk Factors per the 6/8 H/P: Resides in [facility] and has a history of 
Frequent Falls, Chronic low back pain, COPD, Depression, Osteoarthritis, Chronic
Alcohol Dependence, Hypertension, Hyperlipidemia, Irregular sleep pattern, 
Cigarette Smoker.



Clinical Indicators: Presented to the ED on 6/8 via EMS after a fall at the [facility],
found on the bathroom floor, may have been on the floor since the previous 
evening. 

ED Clinical Impression, Weakness, Cough, Fall, Failure to Thrive

6/8 H/P: Decreased power in all muscles. Left arm claw-hand. Patient has 
alcoholic myopathy, nutritional myopathy. 



6/8 VS: T 98.6, P 94, R 22, /103, PO 92 2Lnc

BMI: 22.2

6/8 LAB: Neut 8.5, Lymph 0.9, Cl 110, BUN 36, Cr 0.97, Glucose 147, Cr Kinase 
680, Total Protein 7.6, Albumin 4.7

6/8 Serum Alcohol <10

6/8 CT Brain: Mild - moderate hydrocephalus. CXR: COPD with bilateral chronic 
appearing rib deformities. Spine XR: Scoliosis w/multilevel severe DDD, Superior
endplate compression fractures of T12 & L1. Pelvic XR: negative. 



Treatment: IV fluid bolus 500 mls @ 999 mls/hr q31M, IV Morphine, IV Ativan, IV 
Rocephin, IV fluid 1,000 mls @ 20 mls/hr q24H, IV Doxycycline Hyclate.

6/9 Dietitian consult: Appetite: Fair. Diet: Regular. Nutritional Intake: Poor, 
consumed -25%. Unable to use his left hand. 

Oral Supplements: Ensure Complete TID. 

Weight: 68.039 kg (bedscale), Height 5 ft 9 in. 

BMI: 22.1

Calculate IBW: 72.7

% IBW: 94%. 

Nutrition Diagnosis: Moderate Malnutrition related to decreased appetite. 



Is there an additional diagnosis that is clinically appropriate for this 
patient?



[  ]  Mild Protein-Calorie Malnutrition 

[  ]  Moderate Protein-Calorie Malnutrition

[  ]  Other condition, please specify ___________________

[  ]  Unable to Determine





(Template Last Revised: March 2021)



Moderate protein calorie malnutrition, from decreased oral intake

___________________________________________________________________________

MTDD

## 2021-06-09 NOTE — EEG
ELECTROENCEPHALOGRAM REPORT



DATE OF SERVICE:

06/09/2021



CLINICAL HISTORY:

This is an 86-year-old gentleman with episode of altered mental status.  This 
video EEG

is obtained to evaluate for seizure and epileptiform activity.



RELEVANT MEDICATION:

The patient is not on any antiepileptic drugs.



EEG TYPE:

A routine 21 channel EEG is performed with video using the 10/20 electrode 
placement

system.



DESCRIPTION:

Wakefulness is only obtained.  During wakefulness, there is a posterior dominant
rhythm

of low to moderate voltage, reactive, well modulated, of 7 hertz activity.  
There is no

physiological sleep architecture seen.



There is no focal slowing seen.



Interictal or ictal is none.



ACTIVATION PROCEDURE:

Photic stimulation did not evoke a posterior driving response.

Hyperventilation is not performed.



CLINICAL INTERPRETATION:

This is an abnormal routine EEG.  The mild slowing is suggestive of mild

encephalopathy.  There are no focal slowings, epileptiform discharges or seizure
on the

EEG.  Clinical correlation is recommended.





STIVEN / MELLYN: [id number] / Job#: [id number]

ORI

## 2021-06-09 NOTE — P.PN
Progress Note - Text


Progress Note Date: 06/09/21





Chief Complaint: Found on the bathroom floor





History of presenting complaint:


This is a 86-year-old patient who follows with Dr. Flores.  Patient has a 

history of frequent falls.  Patient lives in an assisted living where he is 

normally helped with his medications.  The ventilator given his medications this

morning and he was found on the bathroom floor.  They believe he was there from 

8 PM the previous night.  He reported that he had fallen because brakes went out

of his cart.  Denied reporting hitting his head.  Patient normally AO 3 but was

found to be slightly confused.  Was unable to get up.  EMS had to help him out. 

Patient has chronic low back pain.  Has a rather congested cough.  Shortness of 

breath.  Some wheezing.  Patient is not a good historian.  Patient has a long-

standing history of drinking alcohol or smoking.  Has had prior admissions with 

fall.


Admitted with acute delirium, metabolic encephalopathy, pneumonia, acute COPD 

exacerbation.  Started on bronchodilators, IV ceftriaxone.


Today: Sitting up in a chair.  Tired.  On seeing some questions.  Short of 

breath.





Review of systems: Was done for constitutional, cardiovascular, GI, pulmonary. 

relevant finding as above





Active Medications





Acetaminophen (Acetaminophen Tab 500 Mg Tab)  1,000 mg PO Q8H PRN


   PRN Reason: Pain


Albuterol/Ipratropium (Ipratropium-Albuterol 3 Ml Neb)  3 ml INHALATION RT-QID 

[facility]


   Last Admin: 06/09/21 16:44 Dose:  3 ml


   Documented by: 


Aspirin (Aspirin 81 Mg)  81 mg PO DAILY [facility]


   Last Admin: 06/09/21 08:29 Dose:  81 mg


   Documented by: 


Budesonide (Budesonide 1 Mg/2 Ml Nebu)  1 mg INHALATION RT-BID [facility]


   Last Admin: 06/09/21 08:59 Dose:  1 mg


   Documented by: 


Cholecalciferol (Cholecalciferol 25 Mcg (1000 Iu) Tablet)  25 mcg PO DAILY [facility]


   Last Admin: 06/09/21 08:29 Dose:  25 mcg


   Documented by: 


Citalopram Hydrobromide (Citalopram Hydrobromide 10 Mg Tab)  30 mg PO DAILY [facility]


   Last Admin: 06/09/21 08:29 Dose:  30 mg


   Documented by: 


Cyanocobalamin (Cyanocobalamin 500 Mcg Tab)  1,000 mcg PO DAILY [facility]


   Last Admin: 06/09/21 08:29 Dose:  1,000 mcg


   Documented by: 


Enoxaparin Sodium (Enoxaparin 40 Mg/0.4 Ml Syringe)  40 mg SQ DAILY [facility]


   Last Admin: 06/09/21 08:30 Dose:  40 mg


   Documented by: 


Fenofibrate (Fenofibrate 160 Mg Tab)  160 mg PO HS [facility]


   Last Admin: 06/08/21 20:52 Dose:  160 mg


   Documented by: 


Folic Acid (Folic Acid 1 Mg Tab)  0.5 mg PO DAILY [facility]


   Last Admin: 06/09/21 08:29 Dose:  0.5 mg


   Documented by: 


Formoterol Fumarate (Formoterol Fumarate 20 Mcg/2 Ml Nebu)  20 mcg INHALATION 

RT-BID [facility]


   Last Admin: 06/09/21 09:00 Dose:  20 mcg


   Documented by: 


Guaifenesin (Guaifenesin 600 Mg Tablet.Er)  600 mg PO QID [facility]


   Last Admin: 06/09/21 17:03 Dose:  600 mg


   Documented by: 


Sodium Chloride (Saline 0.9%)  1,000 mls @ 20 mls/hr IV .Q24H [facility]


   Last Admin: 06/09/21 11:15 Dose:  20 mls/hr


   Documented by: 


Ceftriaxone Sodium 1 gm/ (Sodium Chloride)  50 mls @ 100 mls/hr IVPB Q12H [facility]


   Last Admin: 06/09/21 11:15 Dose:  100 mls/hr


   Documented by: 


Ibuprofen (Ibuprofen 800 Mg Tab)  800 mg PO DAILY PRN


   PRN Reason: Pain


Lisinopril (Lisinopril 10 Mg Tab)  10 mg PO DAILY [facility]


   Last Admin: 06/09/21 08:29 Dose:  10 mg


   Documented by: 


Multivitamins (Multivitamins, Thera 1 Each Tab)  1 each PO DAILY [facility]


   Last Admin: 06/09/21 08:29 Dose:  1 each


   Documented by: 


Naloxone HCl (Naloxone 0.4 Mg/Ml 1 Ml Vial)  0.2 mg IV Q2M PRN


   PRN Reason: Opioid Reversal


Nicotine (Nicotine 21mg/24hr Patch)  1 patch TRANSDERM DAILY [facility]


   Last Admin: 06/09/21 08:30 Dose:  1 patch


   Documented by: 


Thiamine HCl (Thiamine 100 Mg Tab)  100 mg PO DAILY [facility]


   Last Admin: 06/09/21 08:29 Dose:  100 mg


   Documented by: 











Past medical history to include:


COPD, depression, osteoarthritis, chronic alcohol dependence, cigarette smoking,

hypertension, hyperlipidemia, irregular sleep pattern,





Social history:


[facility] [place]  Long-standing history of 

alcohol intake and also smoking cigarettes.  Has a walker





Physical examination:


VITAL SIGNS: 98, 78, 18, 150/60, 94% room air


GENERAL: BMI 22.2, decreased muscle mass and subcutaneous fat decreased.  Less 

confused today.  Leaning over sitting in a chair


EYES: Pupils equal.  Conjunctiva normal.


NECK: JVD not raised; masses not palpable.


HEART: First and second heart sounds are normal;  no edema.  


LUNGS: Respiratory rate increased; decreased breath sound some basal crackles.  


ABDOMEN: Soft,  nontender, liver spleen not palpable, no masses palpable.  


PSYCH: Answering questions better


MUSCULAR skeletal: Decreased muscle mass


NEUROLOGICAL: Cranial nerves grossly intact; no facial asymmetry,   decreased 

generalize power.  Left arm claw-hand 








INVESTIGATIONS, reviewed in the clinical context:


EEG: Evidence of encephalopathy.  No epilepsy


WBC 10.1 hemoglobin 13.6 platelets 365 potassium 4.2 creatinine 0.97


Blood glucose 147.  Serum alcohol less than 10 Coronavirus [PCF]: Not detected


Chest x-ray film personally reviewed by me-hyperinflation.  Possible infiltrate


CT lumbar spine: Supple.  Endplate deformity of T12-L1, scoliosis, neuro 

following last stenosis, bladder distention


Computed tomography scan of the brain: Mild to moderate hydrocephalus, spondylo-

lytic changes








Assessment and plan:





-Fall secondary to multifactorial: acute medical debility on chronic medical 

debility multifactorial.   alcoholic myopathy, nutritional myopathy.  Possible 

NPH


 Fall precautions. 





-Pneumonia, suspect gram-negative organism


IV ceftriaxone





-Acute delirium and metabolic encephalopathy-slow to respond








-Alcoholic and nutritional myopathy


PTOT





-Acute COPD exacerbation, and a current smoker-slow to respond


DuoNeb, inhaled steroids and long-acting beta agonist





-Chronic B12 deficiency


-Vitamin B12 supplement





-Essential hypertension


Continue with Zestril





-Chronic nicotine dependence


Nicotine patch





-Assess for cognitive impairment when patient is most stable.


Differential includes alcohol-induced dementia, normal pressure hydrocephalus, 

B12 deficiency,





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


Discussed with patient in the presence with neurologist Dr. Hsieh.  Patient 

present time does not want any further intervention





Continue PTOT.  IV ceftriaxone.  Other medications to continue.  Patient will 

most likely need inpatient rehab.  Discussed with Dr. Brand from neurology

## 2021-06-09 NOTE — P.CNPUL
History of Present Illness


Consult date: 21


Reason for consult: COPD


Chief complaint: Altered mental status and fall


History of present illness: 





Patient is a pleasant 86-year-old male, admitted into the hospital for falls and

generalized weakness patient has ongoing chronic history of shortness of breath 

or better and cough, overall poor historian not much data can be obtained from 

him, patient has a long-standing history of smoking and ethanol use, chest x-ray

performed revealed infiltrate and hyperinflation also rib fracture on the right 

side likely old and chronic





Review of Systems


All systems: negative





Past Medical History


Past Medical History: CVA/TIA, Hyperlipidemia, Hypertension, Osteoarthritis (OA)


Additional Past Medical History / Comment(s): tia, alcoholic, freq falls. past 

lt humeral/lt femur fx, kidney stone 40 plus years ago. at times tremors, irreg 

sleep pattern


History of Any Multi-Drug Resistant Organisms: None Reported


Past Surgical History: Hernia Repair, Joint Replacement, Orthopedic Surgery


Additional Past Surgical History / Comment(s): SHOULDER LEFT 25 YEARS AGO, lt 

hip hemiarthroplasty


Past Anesthesia/Blood Transfusion Reactions: No Reported Reaction


Past Psychological History: Depression


Additional Psychological History / Comment(s): pt lives at [facility]


Smoking Status: Current every day smoker


Past Alcohol Use History: Daily


Additional Past Alcohol Use History / Comment(s): Patient is a smoker of 3 packs

per day since he was 14 years of age.  He is drinking a 12 pack of beer per day 

for a long period of time. Rare use now residing at [facility]


Past Drug Use History: None Reported





- Past Family History


  ** Mother


Family Medical History: Hypertension





  ** Father


Additional Family Medical History / Comment(s):  in [place] when pt was 12 years

old





Medications and Allergies


                                Home Medications











 Medication  Instructions  Recorded  Confirmed  Type


 


Citalopram Hydrobromide [CeleXA] 30 mg PO DAILY 01/21/15 06/08/21 History


 


lisinopriL [Zestril] 10 mg PO DAILY 01/21/15 06/08/21 History


 


Multivitamins, Thera [Multivitamin 1 tab PO DAILY 02/10/19 06/08/21 History





(formulary)]    


 


Aspirin EC [Ecotrin Low Dose] 81 mg PO DAILY 20 History


 


Cyanocobalamin (Vitamin B-12) 1,000 mcg PO DAILY 20 History





[Vitamin B-12]    


 


Fenofibrate Nanocrystallized 145 mg PO HS 20 History





[Fenofibrate]    


 


Acetaminophen Tab [Tylenol Tab] 500 - 1,000 mg PO Q8H PRN MDD 3gm 21 History


 


Cholecalciferol [Vitamin D3 (25 25 mcg PO DAILY 21 History





Mcg = 1000 Iu)]    


 


Ibuprofen [Motrin] 800 mg PO DAILY PRN 21 History








                                    Allergies











Allergy/AdvReac Type Severity Reaction Status Date / Time


 


adhesive tape Allergy  Rash/Hives Verified 21 10:30














Physical Exam


Vitals: 


                                   Vital Signs











  Temp Pulse Pulse Resp BP BP Pulse Ox


 


 21 16:55   80   16   


 


 21 16:45   78   16    94 L


 


 21 14:00  98.0 F   78  18   150/60  94 L


 


 21 09:23   77     


 


 21 09:14   86     


 


 21 09:01   86     


 


 21 08:35  98.0 F   81  17   166/89  95


 


 21 02:00  98.2 F   83  18   126/68  91 L


 


 21 19:51   85     


 


 21 19:47   85     


 


 21 19:42   84     


 


 21 19:37  98.6 F  92   18  143/74   96








                                Intake and Output











 21





 06:59 14:59 22:59


 


Intake Total  210 


 


Output Total 1  


 


Balance -1 210 


 


Intake:   


 


  Intake, IV Titration  210 





  Amount   


 


    Sodium Chloride 0.9% 1,  160 





    000 ml @ 20 mls/hr IV .   





    Q24H [facility] Rx#:[id number]   


 


    cefTRIAXone 1 gm In  50 





    Sodium Chloride 0.9% 50   





    ml @ 100 mls/hr IVPB Q12H   





    [facility] Rx#:[id number]   


 


Output:   


 


  Stool 1  


 


Other:   


 


  Voiding Method  Indwelling Catheter 


 


  # Bowel Movements   1


 


  Weight  68.039 kg 














- Constitutional


General appearance: average body habitus, disheveled





- EENT


Eyes: EOMI, PERRLA


ENT: normal oropharynx


Ears: bilateral: normal





- Neck


Carotids: bilateral: upstroke normal





- Respiratory


Respiratory: bilateral: diminished





- Cardiovascular


Rhythm: regular


Heart sounds: normal: S1, S2





- Gastrointestinal


General gastrointestinal: normal bowel sounds





- Integumentary


Integumentary: normal, normal turgor





- Musculoskeletal


Musculoskeletal: generalized weakness, strength equal bilaterally





- Psychiatric


Psychiatric: A&O x's 3, appropriate affect, intact judgment & insight





Results





- Laboratory Findings


CBC and BMP: 


                                 21 09:27





                                 21 09:27


PT/INR, D-dimer











PT  10.5 sec (9.0-12.0)   21  09:27    


 


INR  1.0  (<1.2)   21  09:27    








Abnormal lab findings: 


                                  Abnormal Labs











  21





  09:27 09:27 09:27


 


Neutrophils #  8.5 H  


 


Lymphocytes #  0.9 L  


 


Chloride    110 H


 


BUN    36 H


 


Glucose    147 H


 


Creatine Kinase    680 H


 


Urine Protein   1+ H 


 


Amorphous Sediment   Occasional H 


 


Urine Mucus   Rare H 














- Diagnostic Findings


Chest x-ray: report reviewed, image reviewed (Finding as noted above)





Assessment and Plan


Assessment: 


Altered mental status/encephalopathy





Right basal pneumonia





COPD with exacerbation





Recurrent falls





Advanced dementia and Alzheimer's disease





Hypertension hypertensive cardiovascular disease





Dyslipidemia





Chronic long-standing use of alcohol and smoking


Plan: 


Broad-spectrum antibiotics





Bronchodilators





We'll hold on steroids for now





Agree with rehab evaluation and physical therapy likely will require ECF 

placement





Further plan of care as per clinical response of the patient


Time with Patient: Greater than 30

## 2021-06-10 RX ADMIN — IPRATROPIUM BROMIDE AND ALBUTEROL SULFATE SCH: .5; 3 SOLUTION RESPIRATORY (INHALATION) at 12:15

## 2021-06-10 RX ADMIN — Medication SCH MG: at 08:02

## 2021-06-10 RX ADMIN — Medication SCH MCG: at 08:02

## 2021-06-10 RX ADMIN — BUDESONIDE SCH MG: 1 SUSPENSION RESPIRATORY (INHALATION) at 20:25

## 2021-06-10 RX ADMIN — IPRATROPIUM BROMIDE AND ALBUTEROL SULFATE SCH: .5; 3 SOLUTION RESPIRATORY (INHALATION) at 17:52

## 2021-06-10 RX ADMIN — CEFAZOLIN SCH MLS/HR: 330 INJECTION, POWDER, FOR SOLUTION INTRAMUSCULAR; INTRAVENOUS at 11:40

## 2021-06-10 RX ADMIN — FENOFIBRATE SCH MG: 160 TABLET ORAL at 21:51

## 2021-06-10 RX ADMIN — BUDESONIDE SCH MG: 1 SUSPENSION RESPIRATORY (INHALATION) at 08:35

## 2021-06-10 RX ADMIN — FORMOTEROL FUMARATE DIHYDRATE SCH MCG: 20 SOLUTION RESPIRATORY (INHALATION) at 08:35

## 2021-06-10 RX ADMIN — NICOTINE SCH PATCH: 21 PATCH, EXTENDED RELEASE TRANSDERMAL at 08:02

## 2021-06-10 RX ADMIN — CYANOCOBALAMIN TAB 500 MCG SCH MCG: 500 TAB at 08:01

## 2021-06-10 RX ADMIN — IPRATROPIUM BROMIDE AND ALBUTEROL SULFATE SCH ML: .5; 3 SOLUTION RESPIRATORY (INHALATION) at 20:25

## 2021-06-10 RX ADMIN — FOLIC ACID SCH MG: 1 TABLET ORAL at 08:02

## 2021-06-10 RX ADMIN — ENOXAPARIN SODIUM SCH MG: 40 INJECTION SUBCUTANEOUS at 08:01

## 2021-06-10 RX ADMIN — ASPIRIN 81 MG CHEWABLE TABLET SCH MG: 81 TABLET CHEWABLE at 08:02

## 2021-06-10 RX ADMIN — THERA TABS SCH EACH: TAB at 08:01

## 2021-06-10 RX ADMIN — IPRATROPIUM BROMIDE AND ALBUTEROL SULFATE SCH ML: .5; 3 SOLUTION RESPIRATORY (INHALATION) at 08:35

## 2021-06-10 RX ADMIN — CITALOPRAM HYDROBROMIDE SCH MG: 10 TABLET ORAL at 08:01

## 2021-06-10 RX ADMIN — FORMOTEROL FUMARATE DIHYDRATE SCH MCG: 20 SOLUTION RESPIRATORY (INHALATION) at 20:25

## 2021-06-10 NOTE — P.PN
Progress Note - Text


Progress Note Date: 06/10/21





Chief Complaint: Found on the bathroom floor





History of presenting complaint:


This is a 86-year-old patient who follows with Dr. Flores.  Patient has a 

history of frequent falls.  Patient lives in an assisted living where he is 

normally helped with his medications.  The ventilator given his medications this

morning and he was found on the bathroom floor.  They believe he was there from 

8 PM the previous night.  He reported that he had fallen because brakes went out

of his cart.  Denied reporting hitting his head.  Patient normally AO 3 but was

found to be slightly confused.  Was unable to get up.  EMS had to help him out. 

Patient has chronic low back pain.  Has a rather congested cough.  Shortness of 

breath.  Some wheezing.  Patient is not a good historian.  Patient has a long-

standing history of drinking alcohol or smoking.  Has had prior admissions with 

fall.


Admitted with acute delirium, metabolic encephalopathy, pneumonia, acute COPD 

exacerbation.  Started on bronchodilators, IV ceftriaxone.  Gait dysfunction 

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  Also found to underlying

dementia.


Today: Laying in bed.  Less short of breath.  Answered questions better today.  

Forgetful at times.  Oral intake variable





Review of systems: Was done for constitutional, cardiovascular, GI, pulmonary. 

relevant finding as above





Active Medications





Acetaminophen (Acetaminophen Tab 500 Mg Tab)  1,000 mg PO Q8H PRN


   PRN Reason: Pain


Albuterol/Ipratropium (Ipratropium-Albuterol 3 Ml Neb)  3 ml INHALATION RT-QID 

Formerly [facility]


   Last Admin: 06/10/21 12:15 Dose:  Not Given


   Documented by: 


Aspirin (Aspirin 81 Mg)  81 mg PO DAILY [facility]


   Last Admin: 06/10/21 08:02 Dose:  81 mg


   Documented by: 


Budesonide (Budesonide 1 Mg/2 Ml Nebu)  1 mg INHALATION RT-BID [facility]


   Last Admin: 06/10/21 08:35 Dose:  1 mg


   Documented by: 


Cholecalciferol (Cholecalciferol 25 Mcg (1000 Iu) Tablet)  25 mcg PO DAILY [facility]


   Last Admin: 06/10/21 08:02 Dose:  25 mcg


   Documented by: 


Citalopram Hydrobromide (Citalopram Hydrobromide 10 Mg Tab)  30 mg PO DAILY [facility]


   Last Admin: 06/10/21 08:01 Dose:  30 mg


   Documented by: 


Cyanocobalamin (Cyanocobalamin 500 Mcg Tab)  1,000 mcg PO DAILY [facility]


   Last Admin: 06/10/21 08:01 Dose:  1,000 mcg


   Documented by: 


Enoxaparin Sodium (Enoxaparin 40 Mg/0.4 Ml Syringe)  40 mg SQ DAILY [facility]


   Last Admin: 06/10/21 08:01 Dose:  40 mg


   Documented by: 


Fenofibrate (Fenofibrate 160 Mg Tab)  160 mg PO HS [facility]


   Last Admin: 06/09/21 21:47 Dose:  160 mg


   Documented by: 


Folic Acid (Folic Acid 1 Mg Tab)  0.5 mg PO DAILY [facility]


   Last Admin: 06/10/21 08:02 Dose:  0.5 mg


   Documented by: 


Formoterol Fumarate (Formoterol Fumarate 20 Mcg/2 Ml Nebu)  20 mcg INHALATION 

RT-BID [facility]


   Last Admin: 06/10/21 08:35 Dose:  20 mcg


   Documented by: 


Guaifenesin (Guaifenesin 600 Mg Tablet.Er)  600 mg PO QID [facility]


   Last Admin: 06/10/21 11:40 Dose:  600 mg


   Documented by: 


Sodium Chloride (Saline 0.9%)  1,000 mls @ 20 mls/hr IV .Q24H [facility]


   Last Admin: 06/10/21 11:40 Dose:  20 mls/hr


   Documented by: 


Ceftriaxone Sodium 1 gm/ (Sodium Chloride)  50 mls @ 100 mls/hr IVPB Q12H [facility]


   Last Admin: 06/10/21 11:39 Dose:  100 mls/hr


   Documented by: 


Ibuprofen (Ibuprofen 800 Mg Tab)  800 mg PO DAILY PRN


   PRN Reason: Pain


Lisinopril (Lisinopril 10 Mg Tab)  10 mg PO DAILY [facility]


   Last Admin: 06/10/21 08:02 Dose:  10 mg


   Documented by: 


Multivitamins (Multivitamins, Thera 1 Each Tab)  1 each PO DAILY [facility]


   Last Admin: 06/10/21 08:01 Dose:  1 each


   Documented by: 


Naloxone HCl (Naloxone 0.4 Mg/Ml 1 Ml Vial)  0.2 mg IV Q2M PRN


   PRN Reason: Opioid Reversal


Nicotine (Nicotine 21mg/24hr Patch)  1 patch TRANSDERM DAILY [facility]


   Last Admin: 06/10/21 08:02 Dose:  1 patch


   Documented by: 


Thiamine HCl (Thiamine 100 Mg Tab)  100 mg PO DAILY [facility]


   Last Admin: 06/10/21 08:02 Dose:  100 mg


   Documented by: 








Past medical history to include:


COPD, depression, osteoarthritis, chronic alcohol dependence, cigarette smoking,

hypertension, hyperlipidemia, irregular sleep pattern,





Social history:


Lives at [facility] living [place].  Long-standing history of 

alcohol intake and also smoking cigarettes.  Has a walker





Physical examination:


VITAL SIGNS: 98, 92, 18, 148/81, 95% room air


GENERAL: BMI 22.2, decreased muscle mass and subcutaneous fat decreased.  

Communicating better today


EYES: Pupils equal.  Conjunctiva normal.


NECK: JVD not raised; masses not palpable.


HEART: First and second heart sounds are normal;  no edema.  


LUNGS: Respiratory rate increased; decreased breath sound some basal crackles.  


ABDOMEN: Soft,  nontender, liver spleen not palpable, no masses palpable.  


PSYCH: Answering questions better


MUSCULAR skeletal: Decreased muscle mass


NEUROLOGICAL: Cranial nerves grossly intact; no facial asymmetry,   decreased 

generalize power.  Left arm claw-hand 








INVESTIGATIONS, reviewed in the clinical context:


EEG: Evidence of encephalopathy.  No epilepsy


WBC 10.1 hemoglobin 13.6 platelets 365 potassium 4.2 creatinine 0.97


Blood glucose 147.  Serum alcohol less than 10 Coronavirus [PCF]: Not detected


Chest x-ray film personally reviewed by me-hyperinflation.  Possible infiltrate


CT lumbar spine: Supple.  Endplate deformity of T12-L1, scoliosis, neuro 

following last stenosis, bladder distention


Computed tomography scan of the brain: Mild to moderate hydrocephalus, spondylo-

lytic changes








Assessment and plan:





-Fall secondary to multifactorial: acute medical debility on chronic medical 

debility multifactorial.   alcoholic myopathy, nutritional myopathy.  Possible 

NPH.


 Fall precautions. 





-Pneumonia, suspect gram-negative organism-improving


IV ceftriaxone





-Acute delirium and metabolic encephalopathy- improving








-Alcoholic and nutritional myopathy


PTOT





-Acute COPD exacerbation, and a current smoker improving


DuoNeb, inhaled steroids and long-acting beta agonist





-Chronic B12 deficiency


-Vitamin B12 supplement





-Essential hypertension


Continue with Zestril





-Chronic nicotine dependence


Nicotine patch





-Assess for cognitive impairment when patient is most stable.


Differential includes alcohol-induced dementia, normal pressure hydrocephalus, 

B12 deficiency,





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


Discussed with patient in the presence with neurologist Dr. Hsieh.  Patient 

present time does not want any further intervention





Dr. conway higher function cognitive assessment including Montral testing by speech.

  Other medications to continue.  Switch to oral antibiotics in the morning.  

Hopefully discharged to [facility] tomorrow.

## 2021-06-10 NOTE — P.PN
Subjective


Progress Note Date: 06/10/21


Patient seen at bedside and he feels is back to baseline.  Denies any further 

confusion.  Denies of any focal weakness, numbness, visual disturbance are new. 

Denies slurring of speech.





Routine EEG on 06/09/2021 and the EEG is abnormal routine EEG.  The mild slowing

suggestive of mild encephalopathy.  There are no focal slowing, epileptiform 

discharges or seizure on EEG











Objective





- Vital Signs


Vital signs: 


                                   Vital Signs











Temp  98.3 F   06/10/21 07:46


 


Pulse  84   06/10/21 08:53


 


Resp  18   06/10/21 07:46


 


BP  157/72   06/10/21 07:46


 


Pulse Ox  96   06/10/21 07:46








                                 Intake & Output











 06/09/21 06/10/21 06/10/21





 18:59 06:59 18:59


 


Intake Total 210  50


 


Output Total  1100 


 


Balance 210 -1100 50


 


Weight 68.039 kg  


 


Intake:   


 


  Intake, IV Titration 210  50





  Amount   


 


    Sodium Chloride 0.9% 1, 160  





    000 ml @ 20 mls/hr IV .   





    Q24H [facility] Rx#:[id number]   


 


    cefTRIAXone 1 gm In 50  50





    Sodium Chloride 0.9% 50   





    ml @ 100 mls/hr IVPB Q12H   





    [facility] Rx#:[id number]   


 


Output:   


 


  Urine  1100 


 


Other:   


 


  Voiding Method Indwelling Catheter Indwelling Catheter Indwelling Catheter


 


  # Bowel Movements 1  














- Exam


GENERAL: The patient is lying in bed and is not in acute distress.





NEUROLOGICAL:


Higher mental function: The patient is awake, alert, oriented to self, place.  

He stated the month is June but the year is 2020.  He is able to identify 

objects such as pen, watch and glasses. .  Patient is following commands.  No 

aphasia and no neglect.


Cranial nerves: The pupils are round, equal and reactive to light and 

accommodation.  Visual fields are full to confrontation throughout.  Extraocular

movement is intact no nystagmus is noted.  Facial sensation is normal to touch 

throughout.  The facial strength is normal throughout.  Hearing is moderately 

decreased bilaterally to hand rub.  Tongue is midline and moved side-to-side 

without any difficulty.  No dysarthria is noted.  Shoulder shrug is normal 

bilaterally.


Motor: Gait is deferred since he is getting a test.  The strength is spastic 

over the left forearm/hand with atrophy (old per daughter from old injury to 

left upper extremity and not stroke).  Otherwise 5 over 5 throughout.  Normal 

tone and bulk.  


Cerebellum: Normal finger to nose bilaterally.


Sensation: Sensation is normal to touch throughout.


Reflexes (right/left):3++ throughout upper but could not assess lower because of

his cooperation.


Plantars are downgoing bilaterally. 








- Labs


CBC & Chem 7: 


                                 06/08/21 09:27





                                 06/08/21 09:27


Labs: 


                  Abnormal Lab Results - Last 24 Hours (Table)











  06/10/21 Range/Units





  05:49 


 


Prealbumin  14.0 L  (18.0-42.0)  mg/dL














Assessment and Plan


Assessment: 








* Altered mental status.  Possibly due to septic encpehalpathy (from underlying 

  suspected pneumonia)--mentation improved


* Recurrent falls.  Seems multifactorial: Due to component of Normal pressure h

  ydrocephalus, chronic Alcohol use, compoenent cervical and lumbosacral 

  spondylosis.


* Seems to have possible Normal pressure hydrocephalus (has 3 year history of 

  falls, in last couple month has urinary incontinence and in last one month 

  some memory loss).


* Old left upper extremity spasticity and atrophy.  He had a old fall leading to

  spasticity and atrophy (per daughter he had a fall and injured left upper 

  extremity and was on floor for days.  She stated he does not have hx of 

  stroke)


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  

  Moderate to advanced spondylitic changes with degenerative grade 1 

  spondylolysis C3 to C4 and C5 to C6)


* Lumbosacral spondylosis (moderate to severe on the right at the L4-L5 and L5-

  S1)


* Suspected hydroneprhrosis


* Hypertension


* Hyperlipidemia


* Chronic Alcohol use (per daughter she stated he has not been drinking to her 

  knowledge in past 2 years)


* Chronic nicotine dependence


Plan: 





* Routine EEG on 06/09/2021 and the EEG is abnormal routine EEG.  The mild 

  slowing suggestive of mild encephalopathy.  There are no focal slowing, 

  epileptiform discharges or seizure on EEG


* TSH is 1.850 which is normal.  It is likely the patient has vitamin B12 

  deficiency and is on vitamin B 12 1000 g daily and the patient is also on 

  folic acid 0.5 mg daily.  Not sure when the patient was given that diagnosis 

  and what was his lab values.


* Patient is on thiamine 100 mg daily and this should be continued.


* PT and OT are consulted.


* Regarding the possible normal pressure hydrocephalus: The patient and his 

  daughter do no want to pursue with any surgical intervention.


* Regarding the cervical and lumbar spondylosis, also patient and his daughter 

  do not want to pursue with any surgical intervention.


* Patient was counseled on continous alcohol cessation.


* The patient was given Ativan 0.5 mg as well as morphine 2 mg once.  Please 

  avoids any sedations/narcotic/opiate that would affect the patient's 

  neurologist on examination as well as it increases the risk of falls.


* We'll defer the rest of the medical measure to the primary team.


* Upon discharge the patient needs to follow-up with a neurologist as outpatient

  within 1-2 weeks.





The plan is discussed with the patient's daughter (Kathleen via phone) and the 

primary team.


He is clear from neurological stand point.  Neurology will sign off.


Please reconsult if needed.





Ezio Hsieh M.D.


Neuro-hospitalist








Time with Patient: Less than 30

## 2021-06-10 NOTE — P.PN
Subjective


Progress Note Date: 06/10/21


Principal diagnosis: 





Right basal pneumonia





COPD with exacerbation





Recurrent falls





Advanced dementia and Alzheimer's disease





Hypertension hypertensive cardiovascular disease





Dyslipidemia





Chronic long-standing use of alcohol and smoking





06/10/2021, patient currently undergoing physical therapy, on room air, mildly 

short of breath, denies any chest pain, patient remains on broad-spectrum 

antibiotics, as well as fall precautions





Patient is a pleasant 86-year-old male, admitted into the hospital for falls and

generalized weakness patient has ongoing chronic history of shortness of breath 

or better and cough, overall poor historian not much data can be obtained from 

him, patient has a long-standing history of smoking and ethanol use, chest x-ray

performed revealed infiltrate and hyperinflation also rib fracture on the right 

side likely old and chronic











Objective





- Vital Signs


Vital signs: 


                                   Vital Signs











Temp  98.3 F   06/10/21 07:46


 


Pulse  84   06/10/21 08:53


 


Resp  18   06/10/21 07:46


 


BP  157/72   06/10/21 07:46


 


Pulse Ox  96   06/10/21 07:46








                                 Intake & Output











 06/09/21 06/10/21 06/10/21





 18:59 06:59 18:59


 


Intake Total 210  50


 


Output Total  1100 


 


Balance 210 -1100 50


 


Weight 68.039 kg  


 


Intake:   


 


  Intake, IV Titration 210  50





  Amount   


 


    Sodium Chloride 0.9% 1, 160  





    000 ml @ 20 mls/hr IV .   





    Q24H [facility] Rx#:[id number]   


 


    cefTRIAXone 1 gm In 50  50





    Sodium Chloride 0.9% 50   





    ml @ 100 mls/hr IVPB Q12H   





    [facility] Rx#:[id number]   


 


Output:   


 


  Urine  1100 


 


Other:   


 


  Voiding Method Indwelling Catheter Indwelling Catheter Indwelling Catheter


 


  # Bowel Movements 1  














- Exam





 Constitutional


General appearance: average body habitus, disheveled





- EENT


Eyes: EOMI, PERRLA


ENT: normal oropharynx


Ears: bilateral: normal





- Neck


Carotids: bilateral: upstroke normal





- Respiratory


Respiratory: bilateral: diminished





- Cardiovascular


Rhythm: regular


Heart sounds: normal: S1, S2





- Gastrointestinal


General gastrointestinal: normal bowel sounds





- Integumentary


Integumentary: normal, normal turgor





- Musculoskeletal


Musculoskeletal: generalized weakness, strength equal bilaterally





- Psychiatric


Psychiatric: A&O x's 3, appropriate affect, intact judgment & insight








- Labs


CBC & Chem 7: 


                                 06/08/21 09:27





                                 06/08/21 09:27


Labs: 


                  Abnormal Lab Results - Last 24 Hours (Table)











  06/10/21 Range/Units





  05:49 


 


Prealbumin  14.0 L  (18.0-42.0)  mg/dL














Assessment and Plan


Assessment: 


Altered mental status/encephalopathy





Right basal pneumonia





COPD with exacerbation





Recurrent falls





Advanced dementia and Alzheimer's disease





Hypertension hypertensive cardiovascular disease





Dyslipidemia





Chronic long-standing use of alcohol and smoking


Plan: 


Broad-spectrum antibiotics





Bronchodilators





Observe off of steroids for now





Agree with rehab evaluation and physical therapy likely will require ECF 

placement





Further plan of care as per clinical response of the patient


Time with Patient: Greater than 30

## 2021-06-11 LAB
BASOPHILS # BLD AUTO: 0 K/UL (ref 0–0.2)
BASOPHILS NFR BLD AUTO: 0 %
EOSINOPHIL # BLD AUTO: 0.2 K/UL (ref 0–0.7)
EOSINOPHIL NFR BLD AUTO: 2 %
ERYTHROCYTE [DISTWIDTH] IN BLOOD BY AUTOMATED COUNT: 4.17 M/UL (ref 4.3–5.9)
ERYTHROCYTE [DISTWIDTH] IN BLOOD: 14.3 % (ref 11.5–15.5)
HCT VFR BLD AUTO: 36.7 % (ref 39–53)
HGB BLD-MCNC: 12.4 GM/DL (ref 13–17.5)
LYMPHOCYTES # SPEC AUTO: 1.2 K/UL (ref 1–4.8)
LYMPHOCYTES NFR SPEC AUTO: 14 %
MCH RBC QN AUTO: 29.7 PG (ref 25–35)
MCHC RBC AUTO-ENTMCNC: 33.7 G/DL (ref 31–37)
MCV RBC AUTO: 88 FL (ref 80–100)
MONOCYTES # BLD AUTO: 0.6 K/UL (ref 0–1)
MONOCYTES NFR BLD AUTO: 7 %
NEUTROPHILS # BLD AUTO: 6.5 K/UL (ref 1.3–7.7)
NEUTROPHILS NFR BLD AUTO: 75 %
PLATELET # BLD AUTO: 302 K/UL (ref 150–450)
WBC # BLD AUTO: 8.6 K/UL (ref 3.8–10.6)

## 2021-06-11 RX ADMIN — CITALOPRAM HYDROBROMIDE SCH MG: 10 TABLET ORAL at 09:49

## 2021-06-11 RX ADMIN — FOLIC ACID SCH MG: 1 TABLET ORAL at 09:50

## 2021-06-11 RX ADMIN — IPRATROPIUM BROMIDE AND ALBUTEROL SULFATE SCH ML: .5; 3 SOLUTION RESPIRATORY (INHALATION) at 07:12

## 2021-06-11 RX ADMIN — Medication SCH MG: at 09:50

## 2021-06-11 RX ADMIN — BUDESONIDE SCH MG: 1 SUSPENSION RESPIRATORY (INHALATION) at 19:05

## 2021-06-11 RX ADMIN — IPRATROPIUM BROMIDE AND ALBUTEROL SULFATE SCH: .5; 3 SOLUTION RESPIRATORY (INHALATION) at 15:06

## 2021-06-11 RX ADMIN — CEFAZOLIN SCH MLS/HR: 330 INJECTION, POWDER, FOR SOLUTION INTRAMUSCULAR; INTRAVENOUS at 15:46

## 2021-06-11 RX ADMIN — ASPIRIN 81 MG CHEWABLE TABLET SCH MG: 81 TABLET CHEWABLE at 09:48

## 2021-06-11 RX ADMIN — FORMOTEROL FUMARATE DIHYDRATE SCH MCG: 20 SOLUTION RESPIRATORY (INHALATION) at 19:22

## 2021-06-11 RX ADMIN — IPRATROPIUM BROMIDE AND ALBUTEROL SULFATE SCH ML: .5; 3 SOLUTION RESPIRATORY (INHALATION) at 19:05

## 2021-06-11 RX ADMIN — THERA TABS SCH EACH: TAB at 09:48

## 2021-06-11 RX ADMIN — NICOTINE SCH: 21 PATCH, EXTENDED RELEASE TRANSDERMAL at 11:11

## 2021-06-11 RX ADMIN — CITALOPRAM HYDROBROMIDE SCH MG: 10 TABLET ORAL at 11:11

## 2021-06-11 RX ADMIN — IPRATROPIUM BROMIDE AND ALBUTEROL SULFATE SCH: .5; 3 SOLUTION RESPIRATORY (INHALATION) at 10:47

## 2021-06-11 RX ADMIN — FORMOTEROL FUMARATE DIHYDRATE SCH MCG: 20 SOLUTION RESPIRATORY (INHALATION) at 07:12

## 2021-06-11 RX ADMIN — FENOFIBRATE SCH MG: 160 TABLET ORAL at 22:57

## 2021-06-11 RX ADMIN — Medication SCH MCG: at 09:49

## 2021-06-11 RX ADMIN — BUDESONIDE SCH MG: 1 SUSPENSION RESPIRATORY (INHALATION) at 07:13

## 2021-06-11 RX ADMIN — ENOXAPARIN SODIUM SCH MG: 40 INJECTION SUBCUTANEOUS at 09:51

## 2021-06-11 RX ADMIN — CYANOCOBALAMIN TAB 500 MCG SCH MCG: 500 TAB at 09:49

## 2021-06-11 NOTE — P.PN
Subjective


Progress Note Date: 06/11/21


Principal diagnosis: 





Right basal pneumonia





COPD with exacerbation





Recurrent falls





Advanced dementia and Alzheimer's disease





Hypertension hypertensive cardiovascular disease





Dyslipidemia





Chronic long-standing use of alcohol and smoking


06/11/2021, patient seen eval examined during the rounds labs reviewed 

medications reviewed care plan discussed, patient continued to get physical 

therapy, respiratory status stable, oxygen saturation 94% on room air, patient 

is being eval by physical therapy for placement, gait remains an issue due to 

multiple factors including chronic alcoholism and possible myopathy as well as 

normal pressure hydrocephalus





06/10/2021, patient currently undergoing physical therapy, on room air, mildly 

short of breath, denies any chest pain, patient remains on broad-spectrum 

antibiotics, as well as fall precautions





Patient is a pleasant 86-year-old male, admitted into the hospital for falls and

generalized weakness patient has ongoing chronic history of shortness of breath 

or better and cough, overall poor historian not much data can be obtained from 

him, patient has a long-standing history of smoking and ethanol use, chest x-ray

performed revealed infiltrate and hyperinflation also rib fracture on the right 

side likely old and chronic











Objective





- Vital Signs


Vital signs: 


                                   Vital Signs











Temp  97.6 F   06/11/21 07:42


 


Pulse  78   06/11/21 07:42


 


Resp  17   06/11/21 07:42


 


BP  153/84   06/11/21 07:42


 


Pulse Ox  94 L  06/11/21 07:42








                                 Intake & Output











 06/10/21 06/11/21 06/11/21





 18:59 06:59 18:59


 


Intake Total 50  


 


Output Total  1000 


 


Balance 50 -1000 


 


Intake:   


 


  Intake, IV Titration 50  





  Amount   


 


    cefTRIAXone 1 gm In 50  





    Sodium Chloride 0.9% 50   





    ml @ 100 mls/hr IVPB Q12H   





    [facility] Rx#:[id number]   


 


Output:   


 


  Urine  1000 


 


Other:   


 


  Voiding Method Indwelling Catheter Indwelling Catheter Indwelling Catheter














- Exam





 Constitutional


General appearance: average body habitus, disheveled





- EENT


Eyes: EOMI, PERRLA


ENT: normal oropharynx


Ears: bilateral: normal





- Neck


Carotids: bilateral: upstroke normal





- Respiratory


Respiratory: bilateral: diminished





- Cardiovascular


Rhythm: regular


Heart sounds: normal: S1, S2





- Gastrointestinal


General gastrointestinal: normal bowel sounds





- Integumentary


Integumentary: normal, normal turgor





- Musculoskeletal


Musculoskeletal: generalized weakness, strength equal bilaterally





- Psychiatric


Psychiatric: A&O x's 3, appropriate affect, intact judgment & insight








- Labs


CBC & Chem 7: 


                                 06/11/21 06:02





                                 06/08/21 09:27


Labs: 


                  Abnormal Lab Results - Last 24 Hours (Table)











  06/11/21 Range/Units





  06:02 


 


RBC  4.17 L  (4.30-5.90)  m/uL


 


Hgb  12.4 L  (13.0-17.5)  gm/dL


 


Hct  36.7 L  (39.0-53.0)  %














Assessment and Plan


Assessment: 


Altered mental status/encephalopathy





Right basal pneumonia





COPD with exacerbation





Generalized weakness and gait dysfunction due to multiple factors including 

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





Recurrent falls





Advanced dementia and Alzheimer's disease





Hypertension hypertensive cardiovascular disease





Dyslipidemia





Chronic long-standing use of alcohol and smoking


Plan: 


Broad-spectrum antibiotics





Bronchodilators





Observe off of steroids for now





Agree with rehab evaluation and physical therapy likely will require ECF 

placement





Further plan of care as per clinical response of the patient


Time with Patient: Greater than 30

## 2021-06-11 NOTE — P.PN
Progress Note - Text


Progress Note Date: 06/11/21





Chief Complaint: Found on the bathroom floor





History of presenting complaint:


This is a 86-year-old patient who follows with Dr. Flores.  Patient has a 

history of frequent falls.  Patient lives in an assisted living where he is 

normally helped with his medications.  The ventilator given his medications this

morning and he was found on the bathroom floor.  They believe he was there from 

8 PM the previous night.  He reported that he had fallen because brakes went out

of his cart.  Denied reporting hitting his head.  Patient normally AO 3 but was

found to be slightly confused.  Was unable to get up.  EMS had to help him out. 

Patient has chronic low back pain.  Has a rather congested cough.  Shortness of 

breath.  Some wheezing.  Patient is not a good historian.  Patient has a long-

standing history of drinking alcohol or smoking.  Has had prior admissions with 

fall.


Admitted with acute delirium, metabolic encephalopathy, pneumonia, acute COPD 

exacerbation.  Started on bronchodilators, IV ceftriaxone.  Gait dysfunction 

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  Also found to underlying

dementia.


Today: Doing better.  Communicating better.  Did not eat any breakfast.  8 about

50% of lunch.  This confused.  Did walk in the hallway with a walker.





Review of systems: Was done for constitutional, cardiovascular, GI, pulmonary. 

relevant finding as above





Active Medications





Acetaminophen (Acetaminophen Tab 500 Mg Tab)  1,000 mg PO Q8H PRN


   PRN Reason: Pain


Albuterol/Ipratropium (Ipratropium-Albuterol 3 Ml Neb)  3 ml INHALATION RT-QID 

[facility]


   Last Admin: 06/11/21 19:05 Dose:  3 ml


   Documented by: 


Aspirin (Aspirin 81 Mg)  81 mg PO DAILY [facility]


   Last Admin: 06/11/21 09:48 Dose:  81 mg


   Documented by: 


Bacitracin (Bacitracin Oint 1 Each Packet)  1 each TOPICAL QID PRN


   PRN Reason: abraision


   Last Admin: 06/10/21 21:56 Dose:  1 each


   Documented by: 


Budesonide (Budesonide 1 Mg/2 Ml Nebu)  1 mg INHALATION RT-BID [facility]


   Last Admin: 06/11/21 19:05 Dose:  1 mg


   Documented by: 


Cholecalciferol (Cholecalciferol 25 Mcg (1000 Iu) Tablet)  25 mcg PO DAILY [facility]


   Last Admin: 06/11/21 09:49 Dose:  25 mcg


   Documented by: 


Citalopram Hydrobromide (Citalopram Hydrobromide 10 Mg Tab)  30 mg PO DAILY [facility]


   Last Admin: 06/11/21 11:11 Dose:  30 mg


   Documented by: 


Cyanocobalamin (Cyanocobalamin 500 Mcg Tab)  1,000 mcg PO DAILY [facility]


   Last Admin: 06/11/21 09:49 Dose:  1,000 mcg


   Documented by: 


Enoxaparin Sodium (Enoxaparin 40 Mg/0.4 Ml Syringe)  40 mg SQ DAILY [facility]


   Last Admin: 06/11/21 09:51 Dose:  40 mg


   Documented by: 


Fenofibrate (Fenofibrate 160 Mg Tab)  160 mg PO HS [facility]


   Last Admin: 06/10/21 21:51 Dose:  160 mg


   Documented by: 


Folic Acid (Folic Acid 1 Mg Tab)  0.5 mg PO DAILY [facility]


   Last Admin: 06/11/21 09:50 Dose:  0.5 mg


   Documented by: 


Formoterol Fumarate (Formoterol Fumarate 20 Mcg/2 Ml Nebu)  20 mcg INHALATION 

RT-BID [facility]


   Last Admin: 06/11/21 19:22 Dose:  20 mcg


   Documented by: 


Guaifenesin (Guaifenesin 600 Mg Tablet.Er)  600 mg PO QID [facility]


   Last Admin: 06/11/21 19:05 Dose:  600 mg


   Documented by: 


Sodium Chloride (Saline 0.9%)  1,000 mls @ 20 mls/hr IV .Q24H [facility]


   Last Admin: 06/11/21 15:46 Dose:  20 mls/hr


   Documented by: 


Ceftriaxone Sodium 1 gm/ (Sodium Chloride)  50 mls @ 100 mls/hr IVPB Q12H [facility]


   Last Admin: 06/11/21 13:13 Dose:  100 mls/hr


   Documented by: 


Ibuprofen (Ibuprofen 800 Mg Tab)  800 mg PO DAILY PRN


   PRN Reason: Pain


Lisinopril (Lisinopril 10 Mg Tab)  10 mg PO DAILY [facility]


   Last Admin: 06/11/21 09:50 Dose:  10 mg


   Documented by: 


Multivitamins (Multivitamins, Thera 1 Each Tab)  1 each PO DAILY [facility]


   Last Admin: 06/11/21 09:48 Dose:  1 each


   Documented by: 


Naloxone HCl (Naloxone 0.4 Mg/Ml 1 Ml Vial)  0.2 mg IV Q2M PRN


   PRN Reason: Opioid Reversal


Nicotine (Nicotine 21mg/24hr Patch)  1 patch TRANSDERM DAILY [facility]


   Last Admin: 06/11/21 11:11 Dose:  Not Given


   Documented by: 


Thiamine HCl (Thiamine 100 Mg Tab)  100 mg PO DAILY [facility]


   Last Admin: 06/11/21 09:50 Dose:  100 mg


   Documented by: 














Past medical history to include:


COPD, depression, osteoarthritis, chronic alcohol dependence, cigarette smoking,

hypertension, hyperlipidemia, irregular sleep pattern,





Social history:


Lives at [facility].  Long-standing history of 

alcohol intake and also smoking cigarettes.  Has a walker





Physical examination:


VITAL SIGNS: 97.5, 86, 18, 161/77, 94% room air


GENERAL: BMI 22.2, decreased muscle mass and subcutaneous fat decreased.  

Communicating better


EYES: Pupils equal.  Conjunctiva normal.


NECK: JVD not raised; masses not palpable.


HEART: First and second heart sounds are normal;  no edema.  


LUNGS: Respiratory rate increased; decreased breath sound some basal crackles.  


ABDOMEN: Soft,  nontender, liver spleen not palpable, no masses palpable.  


PSYCH: Answering questions 


MUSCULAR skeletal: Decreased muscle mass


NEUROLOGICAL: Cranial nerves grossly intact; no facial asymmetry,   decreased 

generalize power.  Left arm claw-hand .  Using a walker to ambulate








INVESTIGATIONS, reviewed in the clinical context:


June 11: WBC 8.6 hemoglobin 12.4.  Prealbumin 14 pro-calcitonin 0.04


EEG: Evidence of encephalopathy.  No epilepsy


WBC 10.1 hemoglobin 13.6 platelets 365 potassium 4.2 creatinine 0.97


Blood glucose 147.  Serum alcohol less than 10 Coronavirus [PCF]: Not detected


Chest x-ray film personally reviewed by me-hyperinflation.  Possible infiltrate


CT lumbar spine: Supple.  Endplate deformity of T12-L1, scoliosis, neuro 

following last stenosis, bladder distention


Computed tomography scan of the brain: Mild to moderate hydrocephalus, spondylo-

lytic changes








Assessment and plan:





-Fall secondary to multifactorial: acute medical debility on chronic medical 

debility multifactorial.   alcoholic myopathy, nutritional myopathy.  Possible 

NPH.


 Fall precautions. 





-Pneumonia, suspect gram-negative organism-improving


IV ceftriaxone





-Acute delirium and metabolic encephalopathy- improving








-Alcoholic and nutritional myopathy


PTOT





-Acute COPD exacerbation, and a current smoker improving


DuoNeb, inhaled steroids and long-acting beta agonist





-Chronic B12 deficiency


-Vitamin B12 supplement





-Essential hypertension


Continue with Zestril





-Chronic nicotine dependence


Nicotine patch





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 

contribution from possible normal pressure hydrocephalus


Patient had a full Montral cognitive and assessment testing in which she 

obtained a score of 13/30.  This can be followed worked up as an outpatient





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


Discussed with patient in the presence with neurologist Dr. Hsieh.  Patient 

present time does not want any further intervention





Patient will not qualify for inpatient rehab.  Has shown improvement since she's

 been him.  We'll watch for another 24 hours.  Also spoke to patient's daughter 

Kathleen Mac had a lengthy discussion about overall physical and mental state.  

Did explain the results of the Montral cognitive assessment and plan the 

clinical implication of the same.  And that patient be requiring close 

monitoring.  Total time spent today about 45 minutes with over 25 minutes of 

discussion

## 2021-06-12 VITALS — TEMPERATURE: 97.3 F | RESPIRATION RATE: 18 BRPM | SYSTOLIC BLOOD PRESSURE: 123 MMHG | DIASTOLIC BLOOD PRESSURE: 75 MMHG

## 2021-06-12 VITALS — HEART RATE: 88 BPM

## 2021-06-12 RX ADMIN — CITALOPRAM HYDROBROMIDE SCH MG: 10 TABLET ORAL at 08:47

## 2021-06-12 RX ADMIN — IPRATROPIUM BROMIDE AND ALBUTEROL SULFATE SCH ML: .5; 3 SOLUTION RESPIRATORY (INHALATION) at 08:46

## 2021-06-12 RX ADMIN — Medication SCH MG: at 08:46

## 2021-06-12 RX ADMIN — Medication SCH MCG: at 08:46

## 2021-06-12 RX ADMIN — CEFAZOLIN SCH: 330 INJECTION, POWDER, FOR SOLUTION INTRAMUSCULAR; INTRAVENOUS at 12:05

## 2021-06-12 RX ADMIN — ENOXAPARIN SODIUM SCH MG: 40 INJECTION SUBCUTANEOUS at 08:46

## 2021-06-12 RX ADMIN — IPRATROPIUM BROMIDE AND ALBUTEROL SULFATE SCH ML: .5; 3 SOLUTION RESPIRATORY (INHALATION) at 15:23

## 2021-06-12 RX ADMIN — BUDESONIDE SCH MG: 1 SUSPENSION RESPIRATORY (INHALATION) at 08:46

## 2021-06-12 RX ADMIN — NICOTINE SCH PATCH: 21 PATCH, EXTENDED RELEASE TRANSDERMAL at 08:46

## 2021-06-12 RX ADMIN — ASPIRIN 81 MG CHEWABLE TABLET SCH MG: 81 TABLET CHEWABLE at 08:47

## 2021-06-12 RX ADMIN — CYANOCOBALAMIN TAB 500 MCG SCH MCG: 500 TAB at 08:46

## 2021-06-12 RX ADMIN — THERA TABS SCH EACH: TAB at 08:46

## 2021-06-12 RX ADMIN — FOLIC ACID SCH MG: 1 TABLET ORAL at 08:46

## 2021-06-12 RX ADMIN — IPRATROPIUM BROMIDE AND ALBUTEROL SULFATE SCH ML: .5; 3 SOLUTION RESPIRATORY (INHALATION) at 11:36

## 2021-06-12 RX ADMIN — FORMOTEROL FUMARATE DIHYDRATE SCH MCG: 20 SOLUTION RESPIRATORY (INHALATION) at 08:46

## 2021-06-12 NOTE — P.PN
Subjective


Progress Note Date: 06/12/21


Principal diagnosis: 





Right basal pneumonia





COPD with exacerbation





Recurrent falls





Advanced dementia and Alzheimer's disease





Hypertension hypertensive cardiovascular disease





Dyslipidemia





Chronic long-standing use of alcohol and smoking


06/12/2021, patient seen eval examined her respiratory status remains stable 

mostly on room air, patient actively participating in therapy, continued to have

generalized weakness, patient remains on antibiotics for pneumonia remains 

afebrile, intermittent cough is present,





06/11/2021, patient seen eval examined during the rounds labs reviewed 

medications reviewed care plan discussed, patient continued to get physical 

therapy, respiratory status stable, oxygen saturation 94% on room air, patient 

is being eval by physical therapy for placement, gait remains an issue due to 

multiple factors including chronic alcoholism and possible myopathy as well as 

normal pressure hydrocephalus





06/10/2021, patient currently undergoing physical therapy, on room air, mildly 

short of breath, denies any chest pain, patient remains on broad-spectrum 

antibiotics, as well as fall precautions





Patient is a pleasant 86-year-old male, admitted into the hospital for falls and

generalized weakness patient has ongoing chronic history of shortness of breath 

or better and cough, overall poor historian not much data can be obtained from 

him, patient has a long-standing history of smoking and ethanol use, chest x-ray

performed revealed infiltrate and hyperinflation also rib fracture on the right 

side likely old and chronic











Objective





- Vital Signs


Vital signs: 


                                   Vital Signs











Temp  97.5 F L  06/12/21 07:54


 


Pulse  90   06/12/21 09:05


 


Resp  17   06/12/21 07:54


 


BP  168/87   06/12/21 07:54


 


Pulse Ox  90 L  06/12/21 07:54








                                 Intake & Output











 06/11/21 06/12/21 06/12/21





 18:59 06:59 18:59


 


Output Total 675 1001 


 


Balance -675 -1001 


 


Weight 68.039 kg  


 


Output:   


 


  Urine 675 1000 


 


  Stool  1 


 


Other:   


 


  Voiding Method Indwelling Catheter Indwelling Catheter 














- Exam





 Constitutional


General appearance: average body habitus, disheveled





- EENT


Eyes: EOMI, PERRLA


ENT: normal oropharynx


Ears: bilateral: normal





- Neck


Carotids: bilateral: upstroke normal





- Respiratory


Respiratory: bilateral: diminished





- Cardiovascular


Rhythm: regular


Heart sounds: normal: S1, S2





- Gastrointestinal


General gastrointestinal: normal bowel sounds





- Integumentary


Integumentary: normal, normal turgor





- Musculoskeletal


Musculoskeletal: generalized weakness, strength equal bilaterally





- Psychiatric


Psychiatric: A&O x's 3, appropriate affect, intact judgment & insight








- Labs


CBC & Chem 7: 


                                 06/11/21 06:02





                                 06/08/21 09:27





Assessment and Plan


Assessment: 


Altered mental status/encephalopathy





Right basal pneumonia





COPD with exacerbation





Generalized weakness and gait dysfunction due to multiple factors including 

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





Recurrent falls





Advanced dementia and Alzheimer's disease





Hypertension hypertensive cardiovascular disease





Dyslipidemia





Chronic long-standing use of alcohol and smoking


Plan: 


Broad-spectrum antibiotics





Bronchodilators





Observe off of steroids for now





Agree with rehab evaluation and physical therapy likely will require ECF place

ment





Further plan of care as per clinical response of the patient


Time with Patient: Greater than 30

## 2021-06-12 NOTE — P.DS
Providers


Date of admission: 


06/08/21 11:58





Expected date of discharge: 06/12/21


Attending physician: 


Matt Gonzalez





Consults: 





                                        





06/08/21 17:36


Consult Physician Routine 


   Consulting Provider: Meño Neely


   Consult Reason/Comments: COPD


   Do you want consulting provider notified?: Yes





06/08/21 18:36


Consult Physician Routine 


   Consulting Provider: Ezio Hsieh


   Consult Reason/Comments: Altered sensorium


   Do you want consulting provider notified?: Yes











Primary care physician: 


[facility] Course: 





Chief Complaint: Found on the bathroom floor





History of presenting complaint:


This is a 86-year-old patient who follows with Dr. Flores.  Patient has a 

history of frequent falls.  Patient lives in an assisted living where he is 

normally helped with his medications.  The ventilator given his medications this

morning and he was found on the bathroom floor.  They believe he was there from 

8 PM the previous night.  He reported that he had fallen because brakes went out

of his cart.  Denied reporting hitting his head.  Patient normally AO 3 but was

found to be slightly confused.  Was unable to get up.  EMS had to help him out. 

Patient has chronic low back pain.  Has a rather congested cough.  Shortness of 

breath.  Some wheezing.  Patient is not a good historian.  Patient has a long-

standing history of drinking alcohol or smoking.  Has had prior admissions with 

fall.


Admitted with acute delirium, metabolic encephalopathy, pneumonia, acute COPD 

exacerbation.  Started on bronchodilators, IV ceftriaxone.  Gait dysfunction 

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 -Moderate cognitive 

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 

normal pressure hydrocephalus. had a full Montral cognitive  assessment testing

in which he obtained a score of 13/30.  This can be followed worked up as an 

outpatient.  Care was discussed in detail with patient's daughter.  It was 

explained that patient needs close supervision.  She understands the same.


Today: Tolerating diet.  Laying in bed.  Comfortable.  Breathing better.  We'll 

be discharging her home with nebulizer.  Prescription on for the same.  He'll be

going back to his assisted living.  Patient's completed antibiotics.


Discussion and discharge planning more than 35 minutes








Consultation:


Dr. Neely from pulmonary


Dr. Hsieh from neurology








Past medical history to include:


COPD, depression, osteoarthritis, chronic alcohol dependence, cigarette smoking,

hypertension, hyperlipidemia, irregular sleep pattern,





Social history:


[facility] [place]  Long-standing history of 

alcohol intake and also smoking cigarettes.  Has a walker





Physical examination:


VITAL SIGNS: 97.3, 86, 18, 123.75, 94% room air


GENERAL: BMI 22.2, decreased muscle mass and subcutaneous fat decreased.  

Communicating better


EYES: Pupils equal.  Conjunctiva normal.


NECK: JVD not raised; masses not palpable.


HEART: First and second heart sounds are normal;  no edema.  


LUNGS: Respiratory rate increased; decreased breath sound some basal crackles.  


ABDOMEN: Soft,  nontender, liver spleen not palpable, no masses palpable.  


PSYCH: Answering questions 


MUSCULAR skeletal: Decreased muscle mass


NEUROLOGICAL: Cranial nerves grossly intact; no facial asymmetry,   decreased 

generalize power.  Left arm claw-hand .  Using a walker to ambulate








INVESTIGATIONS, reviewed in the clinical context:


June 11: WBC 8.6 hemoglobin 12.4.  Prealbumin 14 pro-calcitonin 0.04


EEG: Evidence of encephalopathy.  No epilepsy


WBC 10.1 hemoglobin 13.6 platelets 365 potassium 4.2 creatinine 0.97


Blood glucose 147.  Serum alcohol less than 10 Coronavirus [PCF]: Not detected


Chest x-ray film personally reviewed by me-hyperinflation.  Possible infiltrate


CT lumbar spine: Supple.  Endplate deformity of T12-L1, scoliosis, neuro 

following last stenosis, bladder distention


Computed tomography scan of the brain: Mild to moderate hydrocephalus, spondylo-

lytic changes








Assessment and plan:





-Fall secondary to multifactorial: acute medical debility on chronic medical 

debility multifactorial.   alcoholic myopathy, nutritional myopathy.  Possible 

NPH.


 Fall precautions. 





-Pneumonia, suspect gram-negative organism-improving


IV ceftriaxone-completed course





-Acute delirium and metabolic encephalopathy- improving








-Alcoholic and nutritional myopathy


PTOT





-Acute COPD exacerbation, and a current smoker improving


DuoNeb, inhaled steroids and long-acting beta agonist.  Discharged home on 

DuoNeb twice daily





-Chronic B12 deficiency


-Vitamin B12 supplement





-Essential hypertension


Continue with Zestril





-Chronic nicotine dependence


Nicotine patch





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 

contribution from possible normal pressure hydrocephalus


Patient had a full Montral cognitive and assessment testing in which she 

obtained a score of 13/30.  This can be followed worked up as an outpatient





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


Discussed with patient in the presence with neurologist Dr. Hsieh.  Patient 

present time does not want any further intervention








Disposition:


Assisted-living/[facility]





Plan - Discharge Summary


Discharge Rx Participant: No


New Discharge Prescriptions: 


New


   Folic Acid 0.5 mg PO DAILY #30 tab


   Nicotine 21Mg/24Hr Patch [Habitrol] 1 patch TRANSDERM DAILY #14 patch


   Thiamine [Vitamin B-1] 100 mg PO DAILY #30 tab


   Ipratropium-Albuterol Nebulize [Duoneb 0.5 mg-3 mg/3 ml Soln] 3 ml INHALATION

BID #60 ml





Continue


   lisinopriL [Zestril] 10 mg PO DAILY


   Citalopram Hydrobromide [CeleXA] 30 mg PO DAILY


   Multivitamins, Thera [Multivitamin (formulary)] 1 tab PO DAILY


   Fenofibrate Nanocrystallized [Fenofibrate] 145 mg PO HS


   Aspirin EC [Ecotrin Low Dose] 81 mg PO DAILY


   Cyanocobalamin (Vitamin B-12) [Vitamin B-12] 1,000 mcg PO DAILY


   Cholecalciferol [Vitamin D3 (25 Mcg = 1000 Iu)] 25 mcg PO DAILY


   Acetaminophen Tab [Tylenol] 500 - 1,000 mg PO Q8H PRN MDD 3gm


     PRN Reason: Pain





Discontinued


   Ibuprofen [Motrin] 800 mg PO DAILY PRN


     PRN Reason: Pain


Discharge Medication List





Citalopram Hydrobromide [CeleXA] 30 mg PO DAILY 01/21/15 [History]


lisinopriL [Zestril] 10 mg PO DAILY 01/21/15 [History]


Multivitamins, Thera [Multivitamin (formulary)] 1 tab PO DAILY 02/10/19 

[History]


Aspirin EC [Ecotrin Low Dose] 81 mg PO DAILY 12/23/20 [History]


Cyanocobalamin (Vitamin B-12) [Vitamin B-12] 1,000 mcg PO DAILY 12/23/20 

[History]


Fenofibrate Nanocrystallized [Fenofibrate] 145 mg PO HS 12/23/20 [History]


Acetaminophen Tab [Tylenol] 500 - 1,000 mg PO Q8H PRN MDD 3gm 06/08/21 [History]


Cholecalciferol [Vitamin D3 (25 Mcg = 1000 Iu)] 25 mcg PO DAILY 06/08/21 

[History]


Folic Acid 0.5 mg PO DAILY #30 tab 06/12/21 [Rx]


Ipratropium-Albuterol Nebulize [Duoneb 0.5 mg-3 mg/3 ml Soln] 3 ml INHALATION 

BID #60 ml 06/12/21 [Rx]


Nicotine 21Mg/24Hr Patch [Habitrol] 1 patch TRANSDERM DAILY #14 patch 06/12/21 

[Rx]


Thiamine [Vitamin B-1] 100 mg PO DAILY #30 tab 06/12/21 [Rx]








Follow up Appointment(s)/Referral(s): 


José Manuel Flores DO [Primary Care Provider] - 1-2 days


[facility], [NON-STAFF] - 1 Week


Meño Neely MD [STAFF PHYSICIAN] - 1 Week


Patient Instructions/Handouts:  Fall Prevention for Older Adults (DC), COPD 

(Chronic Obstructive Pulmonary Disease) (DC)

## 2021-07-01 ENCOUNTER — HOSPITAL ENCOUNTER (EMERGENCY)
Dept: HOSPITAL 47 - EC | Age: 86
Discharge: HOME | End: 2021-07-01
Payer: MEDICARE

## 2021-07-01 VITALS — SYSTOLIC BLOOD PRESSURE: 115 MMHG | TEMPERATURE: 97.9 F | HEART RATE: 56 BPM | DIASTOLIC BLOOD PRESSURE: 70 MMHG

## 2021-07-01 VITALS — RESPIRATION RATE: 16 BRPM

## 2021-07-01 DIAGNOSIS — I10: ICD-10-CM

## 2021-07-01 DIAGNOSIS — Z79.82: ICD-10-CM

## 2021-07-01 DIAGNOSIS — I25.10: ICD-10-CM

## 2021-07-01 DIAGNOSIS — F32.9: ICD-10-CM

## 2021-07-01 DIAGNOSIS — Z87.442: ICD-10-CM

## 2021-07-01 DIAGNOSIS — S82.002A: Primary | ICD-10-CM

## 2021-07-01 DIAGNOSIS — Z86.73: ICD-10-CM

## 2021-07-01 DIAGNOSIS — E78.5: ICD-10-CM

## 2021-07-01 DIAGNOSIS — M19.90: ICD-10-CM
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
## 2021-07-01 DIAGNOSIS — Z96.642: ICD-10-CM

## 2021-07-01 DIAGNOSIS — F17.200: ICD-10-CM

## 2021-07-01 LAB
ALBUMIN SERPL-MCNC: 3.6 G/DL (ref 3.5–5)
ALP SERPL-CCNC: 39 U/L (ref 38–126)
ALT SERPL-CCNC: 9 U/L (ref 4–49)
ANION GAP SERPL CALC-SCNC: 4 MMOL/L
AST SERPL-CCNC: 22 U/L (ref 17–59)
BASOPHILS # BLD AUTO: 0 K/UL (ref 0–0.2)
BASOPHILS NFR BLD AUTO: 1 %
BUN SERPL-SCNC: 18 MG/DL (ref 9–20)
CALCIUM SPEC-MCNC: 9.3 MG/DL (ref 8.4–10.2)
CHLORIDE SERPL-SCNC: 107 MMOL/L (ref 98–107)
CO2 SERPL-SCNC: 27 MMOL/L (ref 22–30)
EOSINOPHIL # BLD AUTO: 0.2 K/UL (ref 0–0.7)
EOSINOPHIL NFR BLD AUTO: 3 %
ERYTHROCYTE [DISTWIDTH] IN BLOOD BY AUTOMATED COUNT: 3.95 M/UL (ref 4.3–5.9)
ERYTHROCYTE [DISTWIDTH] IN BLOOD: 13.9 % (ref 11.5–15.5)
GLUCOSE SERPL-MCNC: 98 MG/DL (ref 74–99)
HCT VFR BLD AUTO: 34.7 % (ref 39–53)
HGB BLD-MCNC: 11.9 GM/DL (ref 13–17.5)
LYMPHOCYTES # SPEC AUTO: 0.8 K/UL (ref 1–4.8)
LYMPHOCYTES NFR SPEC AUTO: 14 %
MCH RBC QN AUTO: 30.3 PG (ref 25–35)
MCHC RBC AUTO-ENTMCNC: 34.4 G/DL (ref 31–37)
MCV RBC AUTO: 88.1 FL (ref 80–100)
MONOCYTES # BLD AUTO: 0.4 K/UL (ref 0–1)
MONOCYTES NFR BLD AUTO: 7 %
NEUTROPHILS # BLD AUTO: 4.5 K/UL (ref 1.3–7.7)
NEUTROPHILS NFR BLD AUTO: 74 %
PLATELET # BLD AUTO: 346 K/UL (ref 150–450)
POTASSIUM SERPL-SCNC: 4.5 MMOL/L (ref 3.5–5.1)
PROT SERPL-MCNC: 6.1 G/DL (ref 6.3–8.2)
SODIUM SERPL-SCNC: 138 MMOL/L (ref 137–145)
WBC # BLD AUTO: 6.1 K/UL (ref 3.8–10.6)

## 2021-07-01 PROCEDURE — 73562 X-RAY EXAM OF KNEE 3: CPT

## 2021-07-01 PROCEDURE — 99284 EMERGENCY DEPT VISIT MOD MDM: CPT

## 2021-07-01 PROCEDURE — 80053 COMPREHEN METABOLIC PANEL: CPT

## 2021-07-01 PROCEDURE — 85025 COMPLETE CBC W/AUTO DIFF WBC: CPT

## 2021-07-01 PROCEDURE — 36415 COLL VENOUS BLD VENIPUNCTURE: CPT

## 2021-07-01 NOTE — XR
EXAMINATION TYPE: XR knee complete LT

 

DATE OF EXAM: 7/1/2021

 

COMPARISON: NONE

 

HISTORY: Pain

 

TECHNIQUE:

Three views are submitted.

 

FINDINGS:

Diffuse osteopenia with narrowing of the medial compartment of the knee joint and patellofemoral join
t. Hypertrophic spurring of the patella noted and there are vascular calcifications. No erosive goldstein
es. No acute fracture. There is a slight deformity of the patella.. Space

 

  

IMPRESSION:

1. Slight cortical step-off involving the patella on the lateral view near the articular surface ann-marie
mmend CT scan to exclude fracture..

## 2021-07-01 NOTE — ED
Fall HPI





- General


Chief Complaint: Fall


Stated Complaint: Fall


Time Seen by Provider: 21 12:28


Source: patient, EMS, RN notes reviewed


Mode of arrival: EMS





- History of Present Illness


Initial Comments: 





86-year-old male presenting to the emergency room complaining of left knee pain.

 He notes that he was outside of his assisted living facility walking.  He notes

that his walker got out ahead of him and he fell on his left knee.  He came to 

the emergency room to get evaluated for some mild knee pain.  He denied any 

tenderness or decreased range of motion or strength in his left lower extremity 

and knee.  He was a well-appearing well-hydrated 86-year-old male in no apparent

distress.  He denied any chest pain first breath headache nausea vomiting 

diarrhea constipation fever fatigue chills.





- Related Data


                                Home Medications











 Medication  Instructions  Recorded  Confirmed


 


Citalopram Hydrobromide [CeleXA] 30 mg PO DAILY 01/21/15 07/01/21


 


lisinopriL [Zestril] 10 mg PO DAILY 01/21/15 07/01/21


 


Multivitamins, Thera [Multivitamin 1 tab PO DAILY 02/10/19 07/01/21





(formulary)]   


 


Aspirin EC [Ecotrin Low Dose] 81 mg PO DAILY 20


 


Cyanocobalamin (Vitamin B-12) 1,000 mcg PO DAILY 20





[Vitamin B-12]   


 


Fenofibrate Nanocrystallized 145 mg PO HS 20





[Fenofibrate]   


 


Acetaminophen Tab [Tylenol] 500 - 1,000 mg PO Q8H PRN 21


 


Cholecalciferol [Vitamin D3 (25 25 mcg PO DAILY 21





Mcg = 1000 Iu)]   


 


Ibuprofen [Motrin] 800 mg PO DAILY PRN 21


 


Ipratropium-Albuterol Nebulize 3 ml INHALATION RT-BID 21





[Duoneb 0.5 mg-3 mg/3 ml Soln]   








                                  Previous Rx's











 Medication  Instructions  Recorded


 


Folic Acid 0.5 mg PO DAILY #30 tab 21


 


Thiamine [Vitamin B-1] 100 mg PO DAILY #30 tab 21











                                    Allergies











Allergy/AdvReac Type Severity Reaction Status Date / Time


 


adhesive tape Allergy  Rash/Hives Verified 21 14:00














Review of Systems


ROS Statement: 


Those systems with pertinent positive or pertinent negative responses have been 

documented in the HPI.





ROS Other: All systems not noted in ROS Statement are negative.





Past Medical History


Past Medical History: CVA/TIA, Hyperlipidemia, Hypertension, Osteoarthritis (OA)


Additional Past Medical History / Comment(s): tia, alcoholic, freq falls. past 

lt humeral/lt femur fx, kidney stone 40 plus years ago. at times tremors, irreg 

sleep pattern


History of Any Multi-Drug Resistant Organisms: None Reported


Past Surgical History: Hernia Repair, Joint Replacement, Orthopedic Surgery


Additional Past Surgical History / Comment(s): SHOULDER LEFT 25 YEARS AGO, lt 

hip hemiarthroplasty


Past Anesthesia/Blood Transfusion Reactions: No Reported Reaction


Past Psychological History: Depression


Smoking Status: Current every day smoker


Past Alcohol Use History: Daily


Past Drug Use History: None Reported





- Past Family History


  ** Mother


Family Medical History: Hypertension





  ** Father


Additional Family Medical History / Comment(s):  [facility] pt was 12 years

old





General Exam


Limitations: no limitations


General appearance: alert, in no apparent distress


Head exam: Present: atraumatic, normocephalic, normal inspection


Eye exam: Present: normal appearance, PERRL, EOMI.  Absent: scleral icterus, 

conjunctival injection, periorbital swelling


Neck exam: Present: normal inspection


Respiratory exam: Present: normal lung sounds bilaterally.  Absent: respiratory 

distress, wheezes, rales, rhonchi, stridor


Cardiovascular Exam: Present: regular rate, normal rhythm, normal heart sounds. 

Absent: systolic murmur, diastolic murmur, rubs, gallop, clicks


  ** Left


Knee exam: Present: normal inspection, full ROM, abrasion (Minimal over the  

knee).  Absent: tenderness, swelling


Neurological exam: Present: alert, oriented X3


Psychiatric exam: Present: normal affect, normal mood


Skin exam: Present: warm, dry, intact, normal color.  Absent: rash





Course


                                   Vital Signs











  21





  12:17


 


Temperature 97.7 F


 


Pulse Rate 54 L


 


Respiratory 16





Rate 


 


Blood Pressure 122/80


 


O2 Sat by Pulse 95





Oximetry 














Medical Decision Making





- Medical Decision Making





86-year-old male complaining of left knee pain after falling on it all being 

outside assisted living facility.


Basic labs, x-ray left knee ordered.


Labs unremarkable.


X-ray shows cortical step-off at the patella.


Case discussed with Dr. Victor, patient can discharge home with follow-up to 

orthopedist for potential computed tomography scan of the left knee.


Knee immobilizer ordered.








- Lab Data


Result diagrams: 


                                 21 13:14





                                 21 13:14


                                   Lab Results











  21 Range/Units





  13:14 13:14 


 


WBC  6.1   (3.8-10.6)  k/uL


 


RBC  3.95 L   (4.30-5.90)  m/uL


 


Hgb  11.9 L   (13.0-17.5)  gm/dL


 


Hct  34.7 L   (39.0-53.0)  %


 


MCV  88.1   (80.0-100.0)  fL


 


MCH  30.3   (25.0-35.0)  pg


 


MCHC  34.4   (31.0-37.0)  g/dL


 


RDW  13.9   (11.5-15.5)  %


 


Plt Count  346   (150-450)  k/uL


 


MPV  7.6   


 


Neutrophils %  74   %


 


Lymphocytes %  14   %


 


Monocytes %  7   %


 


Eosinophils %  3   %


 


Basophils %  1   %


 


Neutrophils #  4.5   (1.3-7.7)  k/uL


 


Lymphocytes #  0.8 L   (1.0-4.8)  k/uL


 


Monocytes #  0.4   (0-1.0)  k/uL


 


Eosinophils #  0.2   (0-0.7)  k/uL


 


Basophils #  0.0   (0-0.2)  k/uL


 


Sodium   138  (137-145)  mmol/L


 


Potassium   4.5  (3.5-5.1)  mmol/L


 


Chloride   107  ()  mmol/L


 


Carbon Dioxide   27  (22-30)  mmol/L


 


Anion Gap   4  mmol/L


 


BUN   18  (9-20)  mg/dL


 


Creatinine   1.06  (0.66-1.25)  mg/dL


 


Est GFR (CKD-EPI)AfAm   75  (>60 ml/min/1.73 sqM)  


 


Est GFR (CKD-EPI)NonAf   65  (>60 ml/min/1.73 sqM)  


 


Glucose   98  (74-99)  mg/dL


 


Calcium   9.3  (8.4-10.2)  mg/dL


 


Total Bilirubin   0.6  (0.2-1.3)  mg/dL


 


AST   22  (17-59)  U/L


 


ALT   9  (4-49)  U/L


 


Alkaline Phosphatase   39  ()  U/L


 


Total Protein   6.1 L  (6.3-8.2)  g/dL


 


Albumin   3.6  (3.5-5.0)  g/dL














- Radiology Data


Radiology results: report reviewed, image reviewed





Left knee x-ray: Slight cortical step-off involving the patella on the lateral 

view near the articular surface recommend computed tomography scan to fracture.





Disposition


Clinical Impression: 


 Left knee pain, Patellar fracture





Disposition: HOME SELF-CARE


Condition: Stable


Instructions (If sedation given, give patient instructions):  Fall Prevention 

for Older Adults (ED)


Additional Instructions: 


Please return to the Emergency Department if symptoms worsen or any other 

concerns.


Follow-up primary care and orthopedist as soon as possible.


Take Tylenol and/or Motrin as needed for pain control.


Her knee immobilizer throughout the day.





Is patient prescribed a controlled substance at d/c from ED?: No


Referrals: 


José Manuel Flores DO [Primary Care Provider] - 1-2 days


Mitchell Faye DO [Doctor of Osteopathic Medicine] - 1-2 days


Time of Disposition: 14:22

## 2021-10-24 ENCOUNTER — HOSPITAL ENCOUNTER (INPATIENT)
Dept: HOSPITAL 47 - EC | Age: 86
LOS: 3 days | Discharge: NURSING, REHAB OR LONG-TERM CARE | DRG: 177 | End: 2021-10-27
Attending: HOSPITALIST | Admitting: HOSPITALIST
Payer: MEDICARE

## 2021-10-24 VITALS — BODY MASS INDEX: 22.1 KG/M2

## 2021-10-24 DIAGNOSIS — E78.5: ICD-10-CM

## 2021-10-24 DIAGNOSIS — I10: ICD-10-CM

## 2021-10-24 DIAGNOSIS — J44.0: ICD-10-CM

## 2021-10-24 DIAGNOSIS — G72.1: ICD-10-CM

## 2021-10-24 DIAGNOSIS — Z91.81: ICD-10-CM

## 2021-10-24 DIAGNOSIS — Z66: ICD-10-CM

## 2021-10-24 DIAGNOSIS — G93.41: ICD-10-CM

## 2021-10-24 DIAGNOSIS — F10.27: ICD-10-CM

## 2021-10-24 DIAGNOSIS — E87.2: ICD-10-CM

## 2021-10-24 DIAGNOSIS — Z79.82: ICD-10-CM

## 2021-10-24 DIAGNOSIS — Z71.6: ICD-10-CM

## 2021-10-24 DIAGNOSIS — Z87.442: ICD-10-CM

## 2021-10-24 DIAGNOSIS — E87.5: ICD-10-CM

## 2021-10-24 DIAGNOSIS — J15.6: Primary | ICD-10-CM

## 2021-10-24 DIAGNOSIS — F32.9: ICD-10-CM

## 2021-10-24 DIAGNOSIS — J44.1: ICD-10-CM

## 2021-10-24 DIAGNOSIS — F17.210: ICD-10-CM

## 2021-10-24 DIAGNOSIS — M19.90: ICD-10-CM

## 2021-10-24 DIAGNOSIS — Z86.73: ICD-10-CM

## 2021-10-24 DIAGNOSIS — N17.0: ICD-10-CM

## 2021-10-24 DIAGNOSIS — Z79.899: ICD-10-CM

## 2021-10-24 DIAGNOSIS — Z82.49: ICD-10-CM

## 2021-10-24 DIAGNOSIS — Z20.822: ICD-10-CM

## 2021-10-24 LAB
ALBUMIN SERPL-MCNC: 4.7 G/DL (ref 3.5–5)
ALP SERPL-CCNC: 38 U/L (ref 38–126)
ALT SERPL-CCNC: 53 U/L (ref 4–49)
ANION GAP SERPL CALC-SCNC: 17 MMOL/L
APTT BLD: 22.4 SEC (ref 22–30)
AST SERPL-CCNC: 167 U/L (ref 17–59)
BASOPHILS # BLD AUTO: 0 K/UL (ref 0–0.2)
BASOPHILS NFR BLD AUTO: 0 %
BUN SERPL-SCNC: 68 MG/DL (ref 9–20)
CALCIUM SPEC-MCNC: 10.4 MG/DL (ref 8.4–10.2)
CHLORIDE SERPL-SCNC: 102 MMOL/L (ref 98–107)
CK SERPL-CCNC: 2292 U/L (ref 55–170)
CO2 SERPL-SCNC: 20 MMOL/L (ref 22–30)
EOSINOPHIL # BLD AUTO: 0.1 K/UL (ref 0–0.7)
EOSINOPHIL NFR BLD AUTO: 1 %
ERYTHROCYTE [DISTWIDTH] IN BLOOD BY AUTOMATED COUNT: 4.42 M/UL (ref 4.3–5.9)
ERYTHROCYTE [DISTWIDTH] IN BLOOD: 14.7 % (ref 11.5–15.5)
GLUCOSE SERPL-MCNC: 107 MG/DL (ref 74–99)
HCT VFR BLD AUTO: 40.2 % (ref 39–53)
HGB BLD-MCNC: 12.8 GM/DL (ref 13–17.5)
INR PPP: 1.2 (ref ?–1.2)
LYMPHOCYTES # SPEC AUTO: 0.7 K/UL (ref 1–4.8)
LYMPHOCYTES NFR SPEC AUTO: 6 %
MAGNESIUM SPEC-SCNC: 2.5 MG/DL (ref 1.6–2.3)
MCH RBC QN AUTO: 29 PG (ref 25–35)
MCHC RBC AUTO-ENTMCNC: 31.9 G/DL (ref 31–37)
MCV RBC AUTO: 90.9 FL (ref 80–100)
MONOCYTES # BLD AUTO: 0.9 K/UL (ref 0–1)
MONOCYTES NFR BLD AUTO: 8 %
NEUTROPHILS # BLD AUTO: 9.8 K/UL (ref 1.3–7.7)
NEUTROPHILS NFR BLD AUTO: 84 %
PH UR: 5.5 [PH] (ref 5–8)
PLATELET # BLD AUTO: 432 K/UL (ref 150–450)
POTASSIUM SERPL-SCNC: 5.5 MMOL/L (ref 3.5–5.1)
POTASSIUM SERPL-SCNC: 6 MMOL/L (ref 3.5–5.1)
PROT SERPL-MCNC: 8.3 G/DL (ref 6.3–8.2)
PROT UR QL: (no result)
PT BLD: 12.5 SEC (ref 9–12)
RBC UR QL: 4 /HPF (ref 0–5)
SODIUM SERPL-SCNC: 139 MMOL/L (ref 137–145)
SP GR UR: 1.02 (ref 1–1.03)
SQUAMOUS UR QL AUTO: <1 /HPF (ref 0–4)
UROBILINOGEN UR QL STRIP: <2 MG/DL (ref ?–2)
WBC # BLD AUTO: 11.6 K/UL (ref 3.8–10.6)
WBC # UR AUTO: 7 /HPF (ref 0–5)

## 2021-10-24 PROCEDURE — 80053 COMPREHEN METABOLIC PANEL: CPT

## 2021-10-24 PROCEDURE — 71046 X-RAY EXAM CHEST 2 VIEWS: CPT

## 2021-10-24 PROCEDURE — 85610 PROTHROMBIN TIME: CPT

## 2021-10-24 PROCEDURE — 80048 BASIC METABOLIC PNL TOTAL CA: CPT

## 2021-10-24 PROCEDURE — 99285 EMERGENCY DEPT VISIT HI MDM: CPT

## 2021-10-24 PROCEDURE — 36415 COLL VENOUS BLD VENIPUNCTURE: CPT

## 2021-10-24 PROCEDURE — 83735 ASSAY OF MAGNESIUM: CPT

## 2021-10-24 PROCEDURE — 84132 ASSAY OF SERUM POTASSIUM: CPT

## 2021-10-24 PROCEDURE — 94640 AIRWAY INHALATION TREATMENT: CPT

## 2021-10-24 PROCEDURE — 81001 URINALYSIS AUTO W/SCOPE: CPT

## 2021-10-24 PROCEDURE — 93005 ELECTROCARDIOGRAM TRACING: CPT

## 2021-10-24 PROCEDURE — 87636 SARSCOV2 & INF A&B AMP PRB: CPT

## 2021-10-24 PROCEDURE — 85730 THROMBOPLASTIN TIME PARTIAL: CPT

## 2021-10-24 PROCEDURE — 82550 ASSAY OF CK (CPK): CPT

## 2021-10-24 PROCEDURE — 85025 COMPLETE CBC W/AUTO DIFF WBC: CPT

## 2021-10-24 RX ADMIN — CEFAZOLIN SCH MLS/HR: 330 INJECTION, POWDER, FOR SOLUTION INTRAMUSCULAR; INTRAVENOUS at 12:47

## 2021-10-24 RX ADMIN — CEFAZOLIN SCH MLS/HR: 330 INJECTION, POWDER, FOR SOLUTION INTRAMUSCULAR; INTRAVENOUS at 19:50

## 2021-10-24 NOTE — ED
General Adult HPI





- General


Chief complaint: Weakness


Stated complaint: AMS


Time Seen by Provider: 10/24/21 09:16


Source: RN/MD, EMS


Mode of arrival: EMS


Limitations: no limitations





- History of Present Illness


Initial comments: 


Dictation was produced using dragon dictation software. please excuse any 

grammatical, word or spelling errors. 











Chief Complaint: 86-year-old male transferred by EMS from assisted living 

facility for weakness





History of Present Illness: 86-year-old male he lives at assisted living 

facility.  He was allegedly was found sleeping on the toilet.  Assisted living 

facility staff went to bring patient his morning medications when he was found 

sleeping on the toilet.  Patient reports that he was too weak to get off the 

toilet go back into bed.  Patient denies any pain complaints.  Denies any 

nausea, vomiting.  No abdominal pain or chest pain.  Patient states he has a 

slight cough that is nonproductive.  No obvious sick exposures.  Denies any 

numbness and paresthesias to the arms or legs.








The ROS documented in this emergency department record has been reviewed and 

confirmed by me.  Those systems with pertinent positive or negative responses 

have been documented in the HPI.  All other systems are other negative and/or 

noncontributory.








PHYSICAL EXAM:


General Impression: Alert and oriented x3, not in acute distress


HEENT: Normocephalic atraumatic, extra-ocular movements intact, pupils equal and

reactive to light bilaterally, mucous membranes moist.


Cardiovascular: Heart regular rate and rhythm


Chest: Able to complete full sentences, no retractions, no tachypnea


Abdomen: abdomen soft, non-tender, non-distended, no organomegaly


Musculoskeletal: Pulses present and equal in all extremities, no peripheral 

edema


Motor:  no focal deficits noted


Neurological: CN II-XII grossly intact, no focal motor or sensory deficits noted


Skin: Intact with no visualized rashes


Psych: Normal affect and mood





ED course: 86-year-old male transferred from assisted living facility for 

weakness.  vital signs upon arrival are within acceptable limits.  Patient 

reports it is a mild cough.  Patient has no focal neurologic deficits.  Doesn't 

appear to be in any acute distress.





Laboratory evaluation obtained.  Mild leukocytosis of 11.6.  Coag panel is 

unremarkable.  Metabolic panel shows 6.0 potassium with slight hemolysis.  Mild 

gap acidosis.  Elevated renal markers cranial 2.64 and a BUN of 68.  Patient do

es not have a history of kidney issues.  Labs from 3 months ago shows normal 

creatinine.  Rest of labs are within acceptable limits.  Negative for influenza,

coronavirus or RSV.  Patient reevaluated at bedside 11:50 AM found to be in 

stable medical condition.  He is resting comfortably.  Patient given intravenous

fluids he'll be admitted for acute kidney injury.  Consultation to nephrology.


EKG interpretation: Ventricular rate 90, normal sinus rhythm,.  160, QRS 80, QTC

474. No TX prolongation, no QTC prolongation, no ST or T-wave changes noted.  

EKG compared to 2021 showing no changes.  Overall, this EKG is 

unremarkable








- Related Data


                                Home Medications











 Medication  Instructions  Recorded  Confirmed


 


Citalopram Hydrobromide [CeleXA] 30 mg PO DAILY 01/21/15 07/01/21


 


lisinopriL [Zestril] 10 mg PO DAILY 01/21/15 07/01/21


 


Multivitamins, Thera [Multivitamin 1 tab PO DAILY 02/10/19 07/01/21





(formulary)]   


 


Aspirin EC [Ecotrin Low Dose] 81 mg PO DAILY 20


 


Cyanocobalamin (Vitamin B-12) 1,000 mcg PO DAILY 20





[Vitamin B-12]   


 


Fenofibrate Nanocrystallized 145 mg PO HS 20





[Fenofibrate]   


 


Acetaminophen Tab [Tylenol] 500 - 1,000 mg PO Q8H PRN 21


 


Cholecalciferol [Vitamin D3 (25 25 mcg PO DAILY 21





Mcg = 1000 Iu)]   


 


Ibuprofen [Motrin] 800 mg PO DAILY PRN 21


 


Ipratropium-Albuterol Nebulize 3 ml INHALATION RT-BID 21





[Duoneb 0.5 mg-3 mg/3 ml Soln]   








                                  Previous Rx's











 Medication  Instructions  Recorded


 


Folic Acid 0.5 mg PO DAILY #30 tab 21


 


Thiamine [Vitamin B-1] 100 mg PO DAILY #30 tab 21











                                    Allergies











Allergy/AdvReac Type Severity Reaction Status Date / Time


 


adhesive tape Allergy  Rash/Hives Verified 21 14:00














Review of Systems


ROS Statement: 


Those systems with pertinent positive or pertinent negative responses have been 

documented in the HPI.





ROS Other: All systems not noted in ROS Statement are negative.





Past Medical History


Past Medical History: CVA/TIA, Hyperlipidemia, Hypertension, Osteoarthritis (OA)


Additional Past Medical History / Comment(s): tia, alcoholic, freq falls. past 

lt humeral/lt femur fx, kidney stone 40 plus years ago. at times tremors, irreg 

sleep pattern


History of Any Multi-Drug Resistant Organisms: None Reported


Past Surgical History: Hernia Repair, Joint Replacement, Orthopedic Surgery


Additional Past Surgical History / Comment(s): SHOULDER LEFT 25 YEARS AGO, lt 

hip hemiarthroplasty


Past Anesthesia/Blood Transfusion Reactions: No Reported Reaction


Past Psychological History: Depression


Smoking Status: Current every day smoker


Past Alcohol Use History: None Reported


Past Drug Use History: None Reported





- Past Family History


  ** Mother


Family Medical History: Hypertension





  ** Father


Additional Family Medical History / Comment(s):  [facility] pt was 12 years

old





General Exam


Limitations: no limitations





Course


                                   Vital Signs











  10/24/21 10/24/21 10/24/21





  09:20 10:32 11:42


 


Temperature 96.9 F L  


 


Pulse Rate 94 98 98


 


Respiratory 18 18 18





Rate   


 


Blood Pressure 123/85 122/89 119/95


 


O2 Sat by Pulse 95 95 95





Oximetry   














Medical Decision Making





- Lab Data


Result diagrams: 


                                 10/24/21 10:10





                                 10/24/21 10:10


                                   Lab Results











  10/24/21 10/24/21 10/24/21 Range/Units





  10:10 10:10 10:10 


 


WBC  11.6 H    (3.8-10.6)  k/uL


 


RBC  4.42    (4.30-5.90)  m/uL


 


Hgb  12.8 L    (13.0-17.5)  gm/dL


 


Hct  40.2    (39.0-53.0)  %


 


MCV  90.9    (80.0-100.0)  fL


 


MCH  29.0    (25.0-35.0)  pg


 


MCHC  31.9    (31.0-37.0)  g/dL


 


RDW  14.7    (11.5-15.5)  %


 


Plt Count  432    (150-450)  k/uL


 


MPV  8.4    


 


Neutrophils %  84    %


 


Lymphocytes %  6    %


 


Monocytes %  8    %


 


Eosinophils %  1    %


 


Basophils %  0    %


 


Neutrophils #  9.8 H    (1.3-7.7)  k/uL


 


Lymphocytes #  0.7 L    (1.0-4.8)  k/uL


 


Monocytes #  0.9    (0-1.0)  k/uL


 


Eosinophils #  0.1    (0-0.7)  k/uL


 


Basophils #  0.0    (0-0.2)  k/uL


 


PT   12.5 H   (9.0-12.0)  sec


 


INR   1.2 H   (<1.2)  


 


APTT   22.4   (22.0-30.0)  sec


 


Sodium    139  (137-145)  mmol/L


 


Potassium    6.0 H  (3.5-5.1)  mmol/L


 


Chloride    102  ()  mmol/L


 


Carbon Dioxide    20 L  (22-30)  mmol/L


 


Anion Gap    17  mmol/L


 


BUN    68 H  (9-20)  mg/dL


 


Creatinine    3.64 H  (0.66-1.25)  mg/dL


 


Est GFR (CKD-EPI)AfAm    17  (>60 ml/min/1.73 sqM)  


 


Est GFR (CKD-EPI)NonAf    14  (>60 ml/min/1.73 sqM)  


 


Glucose    107 H  (74-99)  mg/dL


 


Calcium    10.4 H  (8.4-10.2)  mg/dL


 


Magnesium    2.5 H  (1.6-2.3)  mg/dL


 


Total Bilirubin    1.1  (0.2-1.3)  mg/dL


 


AST    167 H  (17-59)  U/L


 


ALT    53 H  (4-49)  U/L


 


Alkaline Phosphatase    38  ()  U/L


 


Total Protein    8.3 H  (6.3-8.2)  g/dL


 


Albumin    4.7  (3.5-5.0)  g/dL


 


Influenza Type A (PCR)     (Not Detectd)  


 


Influenza Type B (PCR)     (Not Detectd)  


 


RSV (PCR)     (Not Detectd)  


 


SARS-CoV-2 (PCR)     (Not Detectd)  














  10/24/21 Range/Units





  10:10 


 


WBC   (3.8-10.6)  k/uL


 


RBC   (4.30-5.90)  m/uL


 


Hgb   (13.0-17.5)  gm/dL


 


Hct   (39.0-53.0)  %


 


MCV   (80.0-100.0)  fL


 


MCH   (25.0-35.0)  pg


 


MCHC   (31.0-37.0)  g/dL


 


RDW   (11.5-15.5)  %


 


Plt Count   (150-450)  k/uL


 


MPV   


 


Neutrophils %   %


 


Lymphocytes %   %


 


Monocytes %   %


 


Eosinophils %   %


 


Basophils %   %


 


Neutrophils #   (1.3-7.7)  k/uL


 


Lymphocytes #   (1.0-4.8)  k/uL


 


Monocytes #   (0-1.0)  k/uL


 


Eosinophils #   (0-0.7)  k/uL


 


Basophils #   (0-0.2)  k/uL


 


PT   (9.0-12.0)  sec


 


INR   (<1.2)  


 


APTT   (22.0-30.0)  sec


 


Sodium   (137-145)  mmol/L


 


Potassium   (3.5-5.1)  mmol/L


 


Chloride   ()  mmol/L


 


Carbon Dioxide   (22-30)  mmol/L


 


Anion Gap   mmol/L


 


BUN   (9-20)  mg/dL


 


Creatinine   (0.66-1.25)  mg/dL


 


Est GFR (CKD-EPI)AfAm   (>60 ml/min/1.73 sqM)  


 


Est GFR (CKD-EPI)NonAf   (>60 ml/min/1.73 sqM)  


 


Glucose   (74-99)  mg/dL


 


Calcium   (8.4-10.2)  mg/dL


 


Magnesium   (1.6-2.3)  mg/dL


 


Total Bilirubin   (0.2-1.3)  mg/dL


 


AST   (17-59)  U/L


 


ALT   (4-49)  U/L


 


Alkaline Phosphatase   ()  U/L


 


Total Protein   (6.3-8.2)  g/dL


 


Albumin   (3.5-5.0)  g/dL


 


Influenza Type A (PCR)  Not Detected  (Not Detectd)  


 


Influenza Type B (PCR)  Not Detected  (Not Detectd)  


 


RSV (PCR)  Not Detected  (Not Detectd)  


 


SARS-CoV-2 (PCR)  Not Detected  (Not Detectd)  














Disposition


Clinical Impression: 


 Acute kidney injury





Disposition: ADMITTED AS IP TO THIS [facility]


Condition: Fair


Referrals: 


José Manuel Flores DO [Primary Care Provider] - 1-2 days

## 2021-10-25 LAB
ANION GAP SERPL CALC-SCNC: 13 MMOL/L
BUN SERPL-SCNC: 68 MG/DL (ref 9–20)
CALCIUM SPEC-MCNC: 10 MG/DL (ref 8.4–10.2)
CHLORIDE SERPL-SCNC: 105 MMOL/L (ref 98–107)
CO2 SERPL-SCNC: 22 MMOL/L (ref 22–30)
GLUCOSE SERPL-MCNC: 94 MG/DL (ref 74–99)
POTASSIUM SERPL-SCNC: 4.8 MMOL/L (ref 3.5–5.1)
SODIUM SERPL-SCNC: 140 MMOL/L (ref 137–145)

## 2021-10-25 RX ADMIN — FORMOTEROL FUMARATE DIHYDRATE SCH MCG: 20 SOLUTION RESPIRATORY (INHALATION) at 20:19

## 2021-10-25 RX ADMIN — IPRATROPIUM BROMIDE AND ALBUTEROL SULFATE SCH: .5; 3 SOLUTION RESPIRATORY (INHALATION) at 20:08

## 2021-10-25 RX ADMIN — BUDESONIDE SCH MG: 1 SUSPENSION RESPIRATORY (INHALATION) at 20:09

## 2021-10-25 RX ADMIN — FENOFIBRATE SCH MG: 160 TABLET ORAL at 19:51

## 2021-10-25 RX ADMIN — CEFAZOLIN SCH MLS/HR: 330 INJECTION, POWDER, FOR SOLUTION INTRAMUSCULAR; INTRAVENOUS at 05:52

## 2021-10-25 RX ADMIN — ENOXAPARIN SODIUM SCH MG: 30 INJECTION SUBCUTANEOUS at 17:42

## 2021-10-25 RX ADMIN — ASPIRIN 81 MG CHEWABLE TABLET SCH: 81 TABLET CHEWABLE at 17:38

## 2021-10-25 RX ADMIN — CEFAZOLIN SCH: 330 INJECTION, POWDER, FOR SOLUTION INTRAMUSCULAR; INTRAVENOUS at 15:19

## 2021-10-25 RX ADMIN — CEFAZOLIN SCH MLS/HR: 330 INJECTION, POWDER, FOR SOLUTION INTRAMUSCULAR; INTRAVENOUS at 19:52

## 2021-10-25 RX ADMIN — CITALOPRAM HYDROBROMIDE SCH: 10 TABLET ORAL at 17:38

## 2021-10-25 RX ADMIN — IPRATROPIUM BROMIDE AND ALBUTEROL SULFATE SCH ML: .5; 3 SOLUTION RESPIRATORY (INHALATION) at 23:30

## 2021-10-25 RX ADMIN — IPRATROPIUM BROMIDE AND ALBUTEROL SULFATE SCH ML: .5; 3 SOLUTION RESPIRATORY (INHALATION) at 20:09

## 2021-10-25 NOTE — P.HPIM
History of Present Illness


H&P Date: 10/25/21


Chief Complaint: Weak





This is a 86-year-old patient who follows with Dr. Flores.   history of 

frequent falls.  lives in an assisted living where he is normally helped with hi

s medications.  With the staff came in the morning the patient was found to be 

sleeping on the toilet seat.  He said he was too tired to go back to his bed.  

Chronic stable medical conditions include COPD, possible normal pressure 

hydrocephalus, alcoholic myopathy, moderate cognitive impairment.  Left claw 

hand.


Patient stated his appetite is poor.  He has a rather congested cough.  Some 

shortness of breath.  Bit tired.








Review of systems:


GEN.:  Tired, decreased appetite


EYES: None


HEENT: None


NECK: None


RESPIRATORY: Short of breath cough congested


CARDIOVASCULAR: None


GASTROINTESTINAL: None


GENITOURINARY: None


MUSCULOSKELETAL: Generalized muscle weakness, left  claw hand


LYMPHATICS: None


HEMATOLOGICAL: None  


PSYCHIATRY: None


NEUROLOGICAL: Forgetful.





Past medical history to include:


COPD, depression, osteoarthritis, chronic alcohol dependence, cigarette smoking,

hypertension, hyperlipidemia, irregular sleep pattern, NPH,





Social history:


Lives at [facility].  Long-standing history of 

alcohol intake and also smoking cigarettes.  Has a walker





Physical examination:


VITAL SIGNS: 96.9, 94, 18, 123 with 85, 95% room air


GENERAL: Resting in bed, tired.  Bouts of coughing.  Intermittent.  Lethargic


EYES: Pupils equal.  Conjunctiva normal.


HEENT: External appearance of nose and ears normal, oral cavity dry.


NECK: JVD not raised; masses not palpable.


HEART: First and second heart sounds are normal;  no edema.  


LUNGS: Respiratory rate increased; decreased breath sound some  crackles.  


ABDOMEN: Soft,  nontender, liver spleen not palpable, no masses palpable.  


PSYCH: Able to answer simple questions.  Knows the place.  He thinks the year is




MUSCULAR skeletal: Decreased muscle mass.  Left claw hand


NEUROLOGICAL: Cranial nerves grossly intact; no facial asymmetry,   power and 

sensation grossly intact. 


LYMPHATICS: No lymph nodes palpable in the axilla and neck








INVESTIGATIONS, reviewed in the clinical context:


Sodium 140, potassium 4.8, BUN 68, creatinine 2.91


Admission labs:


White count 11.6 hemoglobin 12.8 platelets 432


Sodium 139 potassium 6 BUN 68 creatinine 3.64


CPK 1566, 2292


UA positive for leukoesterase trace, bacteria 7


Influenza type A, type B, RSV, COVID 19 [PCR]: Not detected


EKG tracing personally reviewed by me-normal sinus rhythm, rate 90/m


Chest x-ray film personally reviewed by me-right basal infiltrate.  Old right-

sided and left-sided rib fractures noted.


Previous labs:


BUN 18 creatinine 1.06








Assessment and plan:





-Right lower lobe pneumonia, suspect gram-negative organism


IV ceftriaxone 2 g daily.  Sputum for Gram stain and culture.  Mucinex 600 mg 4 

times a day





-Acute metabolic encephalopathy-from pneumonia


Follow clinically








-Alcoholic  myopathy


PTOT.  Fall precautions





-Acute COPD exacerbation, in a current smoker


DuoNeb, inhaled steroids and long-acting beta agonist. 





-Essential hypertension


Hold Zestril.  Follow blood pressure closely.





-Acute kidney injury combination of ATN, prerenal.


IV fluids.  Follow renal function





-Severe hyperkalemia secondary to acute kidney injury and Zestril


Patient received Kayexalate.  Renal diet.  IV fluids.  Follow renal function





-Acute rhabdomyolysis in the setting of renal failure


IV fluids.  Follow CPK





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 

possible normal pressure hydrocephalus


[facility] in which she 

obtained a score of 13/30.  [This was done in ]





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


Patient has declined further intervention in the past.





IV ceftriaxone 2 g.  Sputum for Gram stain.  Mucinex.  DuoNeb.  Steroids.  PTOT.

 IV fluids.  Chopped diet.  Follow labs.  Fall precaution.


Given the complexity and severity of patient's condition expect the patient to 

be in the hospital at least for 2 overnights





Past Medical History


Past Medical History: CVA/TIA, Hyperlipidemia, Hypertension, Osteoarthritis (OA)


Additional Past Medical History / Comment(s): tia, alcoholic, freq falls. past 

lt humeral/lt femur fx, kidney stone 40 plus years ago. at times tremors, irreg 

sleep pattern


History of Any Multi-Drug Resistant Organisms: None Reported


Past Surgical History: Hernia Repair, Joint Replacement, Orthopedic Surgery


Additional Past Surgical History / Comment(s): SHOULDER LEFT 25 YEARS AGO, lt 

hip hemiarthroplasty


Past Anesthesia/Blood Transfusion Reactions: No Reported Reaction


Past Psychological History: Depression


Smoking Status: Current every day smoker


Past Alcohol Use History: None Reported


Past Drug Use History: None Reported





- Past Family History


  ** Mother


Family Medical History: Hypertension





  ** Father


Additional Family Medical History / Comment(s):  in [place] when pt was 12 years

old





Medications and Allergies


                                Home Medications











 Medication  Instructions  Recorded  Confirmed  Type


 


Citalopram Hydrobromide [CeleXA] 30 mg PO DAILY 01/21/15 10/24/21 History


 


lisinopriL [Zestril] 10 mg PO DAILY 01/21/15 10/24/21 History


 


Aspirin EC [Ecotrin Low Dose] 81 mg PO DAILY 12/23/20 10/24/21 History


 


Fenofibrate Nanocrystallized 145 mg PO HS 12/23/20 10/24/21 History





[Fenofibrate]    


 


Ipratropium-Albuterol Nebulize 3 ml INHALATION RT-QID PRN 07/01/21 10/24/21 

History





[Duoneb 0.5 mg-3 mg/3 ml Soln]    








                                    Allergies











Allergy/AdvReac Type Severity Reaction Status Date / Time


 


adhesive tape Allergy  Rash/Hives Verified 10/25/21 14:51














Physical Exam


Vitals: 


                                   Vital Signs











  Temp Pulse Resp BP Pulse Ox


 


 10/25/21 07:00  97.8 F  64  16  119/88  94 L


 


 10/25/21 04:00   95  18  108/75  96


 


 10/25/21 02:00   94  18   94 L


 


 10/25/21 00:00  98.4 F  97  18  120/79  95


 


 10/24/21 21:58   98  18   95


 


 10/24/21 19:50  98.7 F  105 H  18  133/88  92 L


 


 10/24/21 17:00   95  18  131/80  95


 


 10/24/21 16:00   92   133/95  95


 


 10/24/21 15:00   95  18  130/92  95


 


 10/24/21 14:00   95  18  142/93  95


 


 10/24/21 13:00   95  18  142/93  95


 


 10/24/21 12:00   98  18   95


 


 10/24/21 11:42   98  18  119/95  95














Results


CBC & Chem 7: 


                                 10/24/21 10:10





                                 10/25/21 03:28


Labs: 


                  Abnormal Lab Results - Last 24 Hours (Table)











  10/24/21 10/24/21 10/24/21 Range/Units





  10:10 10:10 10:10 


 


PT  12.5 H    (9.0-12.0)  sec


 


INR  1.2 H    (<1.2)  


 


Potassium   6.0 H   (3.5-5.1)  mmol/L


 


Carbon Dioxide   20 L   (22-30)  mmol/L


 


BUN   68 H   (9-20)  mg/dL


 


Creatinine   3.64 H   (0.66-1.25)  mg/dL


 


Glucose   107 H   (74-99)  mg/dL


 


Calcium   10.4 H   (8.4-10.2)  mg/dL


 


Magnesium   2.5 H   (1.6-2.3)  mg/dL


 


AST   167 H   (17-59)  U/L


 


ALT   53 H   (4-49)  U/L


 


Creatine Kinase    1566 H*  ()  U/L


 


Total Protein   8.3 H   (6.3-8.2)  g/dL


 


Urine Protein     (Negative)  


 


Urine Ketones     (Negative)  


 


Ur Leukocyte Esterase     (Negative)  


 


Urine WBC     (0-5)  /hpf


 


Urine Bacteria     (None)  /hpf


 


Urine Mucus     (None)  /hpf














  10/24/21 10/24/21 10/25/21 Range/Units





  13:23 13:54 03:28 


 


PT     (9.0-12.0)  sec


 


INR     (<1.2)  


 


Potassium   5.5 H   (3.5-5.1)  mmol/L


 


Carbon Dioxide     (22-30)  mmol/L


 


BUN    68 H  (9-20)  mg/dL


 


Creatinine    2.91 H  (0.66-1.25)  mg/dL


 


Glucose     (74-99)  mg/dL


 


Calcium     (8.4-10.2)  mg/dL


 


Magnesium     (1.6-2.3)  mg/dL


 


AST     (17-59)  U/L


 


ALT     (4-49)  U/L


 


Creatine Kinase   2292 H*   ()  U/L


 


Total Protein     (6.3-8.2)  g/dL


 


Urine Protein  1+ H    (Negative)  


 


Urine Ketones  Trace H    (Negative)  


 


Ur Leukocyte Esterase  Trace H    (Negative)  


 


Urine WBC  7 H    (0-5)  /hpf


 


Urine Bacteria  Rare H    (None)  /hpf


 


Urine Mucus  Rare H    (None)  /hpf

## 2021-10-25 NOTE — XR
EXAMINATION TYPE: XR chest 2V

 

DATE OF EXAM: 10/25/2021

 

COMPARISON: Chest x-ray 6/8/2021

 

HISTORY: Cough

 

TECHNIQUE:  Frontal and lateral views of the chest are obtained.

 

FINDINGS:  There is probable calcified left hilar nodes, question some airspace disease in the right 
lower lobe.  The cardiac silhouette size is within normal limits.  There are overlying artifacts. The
 osseous structures are stable, old right-sided and left rib fractures are again noted, there may be 
chronic pleural reaction on the right. The aorta is dense. Right shoulder shows probable calcified te
ndinous insertion on the humeral head, arthropathy with marginal spurring at the glenohumeral joint, 
areas chronic distal clavicular fracture change with postoperative changes in the proximal left humer
us

 

IMPRESSION:  Correlate for possible pneumonia right lower lobe, there may be associated effusion

## 2021-10-26 LAB
ANION GAP SERPL CALC-SCNC: 8 MMOL/L
BASOPHILS # BLD AUTO: 0 K/UL (ref 0–0.2)
BASOPHILS NFR BLD AUTO: 0 %
BUN SERPL-SCNC: 64 MG/DL (ref 9–20)
CALCIUM SPEC-MCNC: 9.3 MG/DL (ref 8.4–10.2)
CHLORIDE SERPL-SCNC: 107 MMOL/L (ref 98–107)
CK SERPL-CCNC: 933 U/L (ref 55–170)
CO2 SERPL-SCNC: 22 MMOL/L (ref 22–30)
EOSINOPHIL # BLD AUTO: 0.1 K/UL (ref 0–0.7)
EOSINOPHIL NFR BLD AUTO: 1 %
ERYTHROCYTE [DISTWIDTH] IN BLOOD BY AUTOMATED COUNT: 4 M/UL (ref 4.3–5.9)
ERYTHROCYTE [DISTWIDTH] IN BLOOD: 15 % (ref 11.5–15.5)
GLUCOSE SERPL-MCNC: 90 MG/DL (ref 74–99)
HCT VFR BLD AUTO: 36.7 % (ref 39–53)
HGB BLD-MCNC: 11.7 GM/DL (ref 13–17.5)
LYMPHOCYTES # SPEC AUTO: 1 K/UL (ref 1–4.8)
LYMPHOCYTES NFR SPEC AUTO: 9 %
MCH RBC QN AUTO: 29.1 PG (ref 25–35)
MCHC RBC AUTO-ENTMCNC: 31.8 G/DL (ref 31–37)
MCV RBC AUTO: 91.7 FL (ref 80–100)
MONOCYTES # BLD AUTO: 0.7 K/UL (ref 0–1)
MONOCYTES NFR BLD AUTO: 7 %
NEUTROPHILS # BLD AUTO: 9.1 K/UL (ref 1.3–7.7)
NEUTROPHILS NFR BLD AUTO: 82 %
PLATELET # BLD AUTO: 364 K/UL (ref 150–450)
POTASSIUM SERPL-SCNC: 5.5 MMOL/L (ref 3.5–5.1)
SODIUM SERPL-SCNC: 137 MMOL/L (ref 137–145)
WBC # BLD AUTO: 11.1 K/UL (ref 3.8–10.6)

## 2021-10-26 RX ADMIN — METHYLPREDNISOLONE SODIUM SUCCINATE SCH MG: 40 INJECTION, POWDER, FOR SOLUTION INTRAMUSCULAR; INTRAVENOUS at 23:18

## 2021-10-26 RX ADMIN — IPRATROPIUM BROMIDE AND ALBUTEROL SULFATE SCH ML: .5; 3 SOLUTION RESPIRATORY (INHALATION) at 09:07

## 2021-10-26 RX ADMIN — BUDESONIDE SCH MG: 1 SUSPENSION RESPIRATORY (INHALATION) at 09:18

## 2021-10-26 RX ADMIN — METHYLPREDNISOLONE SODIUM SUCCINATE SCH MG: 40 INJECTION, POWDER, FOR SOLUTION INTRAMUSCULAR; INTRAVENOUS at 17:17

## 2021-10-26 RX ADMIN — ENOXAPARIN SODIUM SCH MG: 30 INJECTION SUBCUTANEOUS at 09:15

## 2021-10-26 RX ADMIN — CEFAZOLIN SCH: 330 INJECTION, POWDER, FOR SOLUTION INTRAMUSCULAR; INTRAVENOUS at 17:18

## 2021-10-26 RX ADMIN — CEFAZOLIN SCH: 330 INJECTION, POWDER, FOR SOLUTION INTRAMUSCULAR; INTRAVENOUS at 03:43

## 2021-10-26 RX ADMIN — IPRATROPIUM BROMIDE AND ALBUTEROL SULFATE SCH ML: .5; 3 SOLUTION RESPIRATORY (INHALATION) at 03:30

## 2021-10-26 RX ADMIN — BUDESONIDE SCH MG: 1 SUSPENSION RESPIRATORY (INHALATION) at 18:58

## 2021-10-26 RX ADMIN — FORMOTEROL FUMARATE DIHYDRATE SCH MCG: 20 SOLUTION RESPIRATORY (INHALATION) at 18:58

## 2021-10-26 RX ADMIN — FENOFIBRATE SCH MG: 160 TABLET ORAL at 20:48

## 2021-10-26 RX ADMIN — ASPIRIN 81 MG CHEWABLE TABLET SCH MG: 81 TABLET CHEWABLE at 09:15

## 2021-10-26 RX ADMIN — IPRATROPIUM BROMIDE AND ALBUTEROL SULFATE SCH ML: .5; 3 SOLUTION RESPIRATORY (INHALATION) at 18:58

## 2021-10-26 RX ADMIN — CITALOPRAM HYDROBROMIDE SCH MG: 10 TABLET ORAL at 09:14

## 2021-10-26 RX ADMIN — IPRATROPIUM BROMIDE AND ALBUTEROL SULFATE SCH ML: .5; 3 SOLUTION RESPIRATORY (INHALATION) at 23:51

## 2021-10-26 RX ADMIN — CEFAZOLIN SCH: 330 INJECTION, POWDER, FOR SOLUTION INTRAMUSCULAR; INTRAVENOUS at 11:28

## 2021-10-26 RX ADMIN — IPRATROPIUM BROMIDE AND ALBUTEROL SULFATE SCH: .5; 3 SOLUTION RESPIRATORY (INHALATION) at 12:14

## 2021-10-26 RX ADMIN — FORMOTEROL FUMARATE DIHYDRATE SCH MCG: 20 SOLUTION RESPIRATORY (INHALATION) at 09:07

## 2021-10-26 RX ADMIN — IPRATROPIUM BROMIDE AND ALBUTEROL SULFATE SCH ML: .5; 3 SOLUTION RESPIRATORY (INHALATION) at 15:34

## 2021-10-26 NOTE — P.PN
Progress Note - Text


Progress Note Date: 10/26/21





Chief Complaint: Weak





This is a 86-year-old patient who follows with Dr. Flores.   history of 

frequent falls.  lives in an assisted living where he is normally helped with 

his medications.  With the staff came in the morning the patient was found to be

sleeping on the toilet seat.  He said he was too tired to go back to his bed.  

Chronic stable medical conditions include COPD, possible normal pressure 

hydrocephalus, alcoholic myopathy, moderate cognitive impairment.  Left claw 

hand.


Patient stated his appetite is poor.  He has a rather congested cough.  Some 

shortness of breath.  Bit tired.


Admitted with pneumonia, COPD exacerbation, acute kidney injury, acute metabolic

encephalopathy.


10/26/2021: Laying in bed.  Tired.  Bit more awake.  Cough.  Some shortness of 

breath.  Breathing of sputum.  Didn't eat any breakfast.  8 about 50% of his 

lunch.  Spoke to patient's legal guardian Kathleen.  Updated.  CODE STATUS is DO 

NOT RESUSCITATE.





Review of systems: Was done for constitutional, cardiovascular, GI, pulmonary. 

relevant finding as above





Active Medications





Acetaminophen (Acetaminophen Tab 325 Mg Tab)  650 mg PO Q6HR PRN


   PRN Reason: Mild Pain or Fever > 100.5


Albuterol/Ipratropium (Ipratropium-Albuterol 3 Ml Neb)  3 ml INHALATION RT-Q4H 

[facility]


   Last Admin: 10/26/21 15:34 Dose:  3 ml


   Documented by: 


Aspirin (Aspirin 81 Mg)  81 mg PO DAILY [facility]


   Last Admin: 10/26/21 09:15 Dose:  81 mg


   Documented by: 


Budesonide (Budesonide 1 Mg/2 Ml Nebu)  1 mg INHALATION RT-BID [facility]


   Last Admin: 10/26/21 09:18 Dose:  1 mg


   Documented by: 


Citalopram Hydrobromide (Citalopram Hydrobromide 10 Mg Tab)  30 mg PO DAILY [facility]


   Last Admin: 10/26/21 09:14 Dose:  30 mg


   Documented by: 


Enoxaparin Sodium (Enoxaparin 30 Mg/0.3 Ml Syringe)  30 mg SQ DAILY [facility]


   Last Admin: 10/26/21 09:15 Dose:  30 mg


   Documented by: 


Fenofibrate (Fenofibrate 160 Mg Tab)  160 mg PO HS [facility]


   Last Admin: 10/25/21 19:51 Dose:  160 mg


   Documented by: 


Formoterol Fumarate (Formoterol Fumarate 20 Mcg/2 Ml Nebu)  20 mcg INHALATION 

RT-BID [facility]


   Last Admin: 10/26/21 09:07 Dose:  20 mcg


   Documented by: 


Guaifenesin (Guaifenesin 600 Mg Tablet.Er)  600 mg PO QID [facility]


   Last Admin: 10/26/21 13:57 Dose:  600 mg


   Documented by: 


Sodium Chloride (Saline 0.9%)  1,000 mls @ 130 mls/hr IV .Q7H42M [facility]


   Last Admin: 10/26/21 11:28 Dose:  Not Given


   Documented by: 


Ceftriaxone Sodium 2 gm/ (Sodium Chloride)  50 mls @ 100 mls/hr IVPB Q24HR [facility]


   Last Admin: 10/26/21 09:15 Dose:  100 mls/hr


   Documented by: 


Naloxone HCl (Naloxone 0.4 Mg/Ml 1 Ml Vial)  0.2 mg IV Q2M PRN


   PRN Reason: Opioid Reversal


Ondansetron HCl (Ondansetron 4 Mg/2 Ml Vial)  4 mg IVP Q8HR PRN


   PRN Reason: Nausea And Vomiting








Past medical history to include:


COPD, depression, osteoarthritis, chronic alcohol dependence, cigarette smoking,

hypertension, hyperlipidemia, irregular sleep pattern, NPH,





Social history:


Lives at [facility].  Long-standing history of 

alcohol intake and also smoking cigarettes.  Has a walker





Physical examination:


VITAL SIGNS: 97.6, 62, 16, 135/80, 95% on 2 L


GENERAL: Laying in bed, tired, less lethargic today, decreased coughing


EYES: Pupils equal.  Conjunctiva normal.


HEENT: External appearance of nose and ears normal, oral cavity dry.


NECK: JVD not raised; masses not palpable.


HEART: First and second heart sounds are normal;  no edema.  


LUNGS: Respiratory rate increased; decreased breath sound some  crackles.  


ABDOMEN: Soft,  nontender, liver spleen not palpable, no masses palpable.  


PSYCH: Able tonsil simple questions, more awake today


MUSCULAR skeletal: Decreased muscle mass.  Left claw hand











INVESTIGATIONS, reviewed in the clinical context:


October 26: White count 9.1 hemoglobin 11.7 platelets 364 potassium 5.5 BUN 64 

creatinine 1.38


Sodium 140, potassium 4.8, BUN 68, creatinine 2.91


Admission labs:


White count 11.6 hemoglobin 12.8 platelets 432


Sodium 139 potassium 6 BUN 68 creatinine 3.64


CPK 1566, 2292


UA positive for leukoesterase trace, bacteria 7


Influenza type A, type B, RSV, COVID 19 [PCR]: Not detected


EKG tracing personally reviewed by me-normal sinus rhythm, rate 90/m


Chest x-ray film personally reviewed by me-right basal infiltrate.  Old right-

sided and left-sided rib fractures noted.


Previous labs:


BUN 18 creatinine 1.06








Assessment and plan:





-Right lower lobe pneumonia, suspect gram-negative organism


IV ceftriaxone 2 g daily.  Sputum for Gram stain and culture.  Mucinex 600 mg 4 

times a day





-Acute metabolic encephalopathy-from pneumonia: Improving


Follow clinically








-Alcoholic  myopathy


PTOT.  Fall precautions





-Acute COPD exacerbation, in a current smoker: Slow to respond


DuoNeb, inhaled steroids and long-acting beta agonist. 





-Essential hypertension


Hold Zestril.  Follow blood pressure closely.





-Acute kidney injury combination of ATN, prerenal.:  Improving


IV fluids.  Follow renal function.  Decrease IV fluids





-Severe hyperkalemia secondary to acute kidney injury and Zestril: Slow to 

respond


Patient received Kayexalate.  Renal diet.  IV fluids.  Follow renal function.  

Repeat Kayexalate





-Acute rhabdomyolysis in the setting of renal failure: Better


IV fluids.  Follow CPK





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 

possible normal pressure hydrocephalus


[facility] which she 

obtained a score of 13/30.  [This was done in June of this 2021]





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


Patient has declined further intervention in the past.





-DO NOT RESUSCITATE





Continue IV ceftriaxone.  IV fluids decreased.  Continue breathing treatments.  

Spoke to the patient's legal guardian at length.  Questions answered.  Patient's

course status is DO NOT RESUSCITATE..  Total time spent today about 40 minutes 

with over 25 minutes of discussion.

## 2021-10-27 VITALS — HEART RATE: 65 BPM

## 2021-10-27 VITALS — RESPIRATION RATE: 18 BRPM

## 2021-10-27 VITALS — TEMPERATURE: 97.8 F | SYSTOLIC BLOOD PRESSURE: 163 MMHG | DIASTOLIC BLOOD PRESSURE: 108 MMHG

## 2021-10-27 LAB
ANION GAP SERPL CALC-SCNC: 10 MMOL/L
BUN SERPL-SCNC: 41 MG/DL (ref 9–20)
CALCIUM SPEC-MCNC: 9.2 MG/DL (ref 8.4–10.2)
CHLORIDE SERPL-SCNC: 108 MMOL/L (ref 98–107)
CO2 SERPL-SCNC: 22 MMOL/L (ref 22–30)
GLUCOSE SERPL-MCNC: 139 MG/DL (ref 74–99)
POTASSIUM SERPL-SCNC: 4.1 MMOL/L (ref 3.5–5.1)
SODIUM SERPL-SCNC: 140 MMOL/L (ref 137–145)

## 2021-10-27 RX ADMIN — CEFAZOLIN SCH: 330 INJECTION, POWDER, FOR SOLUTION INTRAMUSCULAR; INTRAVENOUS at 08:26

## 2021-10-27 RX ADMIN — IPRATROPIUM BROMIDE AND ALBUTEROL SULFATE SCH ML: .5; 3 SOLUTION RESPIRATORY (INHALATION) at 15:07

## 2021-10-27 RX ADMIN — ENOXAPARIN SODIUM SCH MG: 30 INJECTION SUBCUTANEOUS at 08:23

## 2021-10-27 RX ADMIN — BUDESONIDE SCH MG: 1 SUSPENSION RESPIRATORY (INHALATION) at 08:49

## 2021-10-27 RX ADMIN — IPRATROPIUM BROMIDE AND ALBUTEROL SULFATE SCH ML: .5; 3 SOLUTION RESPIRATORY (INHALATION) at 03:50

## 2021-10-27 RX ADMIN — METHYLPREDNISOLONE SODIUM SUCCINATE SCH MG: 40 INJECTION, POWDER, FOR SOLUTION INTRAMUSCULAR; INTRAVENOUS at 08:22

## 2021-10-27 RX ADMIN — CITALOPRAM HYDROBROMIDE SCH MG: 10 TABLET ORAL at 08:22

## 2021-10-27 RX ADMIN — ASPIRIN 81 MG CHEWABLE TABLET SCH MG: 81 TABLET CHEWABLE at 08:22

## 2021-10-27 RX ADMIN — IPRATROPIUM BROMIDE AND ALBUTEROL SULFATE SCH ML: .5; 3 SOLUTION RESPIRATORY (INHALATION) at 11:58

## 2021-10-27 RX ADMIN — FORMOTEROL FUMARATE DIHYDRATE SCH MCG: 20 SOLUTION RESPIRATORY (INHALATION) at 08:49

## 2021-10-27 RX ADMIN — IPRATROPIUM BROMIDE AND ALBUTEROL SULFATE SCH ML: .5; 3 SOLUTION RESPIRATORY (INHALATION) at 08:49

## 2021-10-27 NOTE — P.DS
Providers


Date of admission: 


10/24/21 11:50





Expected date of discharge: 10/27/21


Attending physician: 


Matt Gonzalez





Primary care physician: 


José Manuel Flores





[facility] Course: 





Chief Complaint: Weak





This is a 86-year-old patient who follows with Dr. Flores.   history of 

frequent falls.  lives in an assisted living where he is normally helped with 

his medications.  With the staff came in the morning the patient was found to be

sleeping on the toilet seat.  He said he was too tired to go back to his bed.  

Chronic stable medical conditions include COPD, possible normal pressure 

hydrocephalus, alcoholic myopathy, moderate cognitive impairment.  Left claw 

hand.


Patient stated his appetite is poor.  He has a rather congested cough.  Some 

shortness of breath.  Bit tired.


Admitted with pneumonia, COPD exacerbation, acute kidney injury, acute metabolic

encephalopathy.  Given IV fluids.  Creatinine improved from 2.91 down to 1.04.  

Hypokalemia corrected.  Patient started to eat better.  Patient advised against 

smoking.  IV ceftriaxone for pneumonia.


 Spoke to patient's legal guardian Kathleen.  Updated.  CODE STATUS is DO NOT 

RESUSCITATE.


Today: Laying in bed.  More awake.  Decreased cough.  Breathing better.  Oral 

intake improved.  Patient be going to the inpatient rehab.  Change antibiotic to

by mouth Ceftin.  Prednisone taper.  Start Symbicort.  Continue  DuoNeb.


Given the complexity and severity of patient's condition expect the patient to 

be in the hospital at least for 2 overnights














Past medical history to include:


COPD, depression, osteoarthritis, chronic alcohol dependence, cigarette smoking,

hypertension, hyperlipidemia, irregular sleep pattern, NPH,





Social history:


Lives at [facility] [place].  Long-standing history of 

alcohol intake and also smoking cigarettes.  Has a walker





Physical examination:


VITAL SIGNS: 98.1, 95, 18, 162/98, 90% on 3 daughters


GENERAL: Laying in bed, more awake, less tired


EYES: Pupils equal.  Conjunctiva normal.


HEENT: External appearance of nose and ears normal, oral cavity dry.


NECK: JVD not raised; masses not palpable.


HEART: First and second heart sounds are normal;  no edema.  


LUNGS: Respiratory rate increased; decreased breath sound some  crackles.  


ABDOMEN: Soft,  nontender, liver spleen not palpable, no masses palpable.  


PSYCH: Able to answer simple questions


MUSCULAR skeletal: Decreased muscle mass.  Left claw hand











INVESTIGATIONS, reviewed in the clinical context:


October 27: Potassium 4.1 BUN 41 creatinine 1.04


October 26: White count 9.1 hemoglobin 11.7 platelets 364 potassium 5.5 BUN 64 

creatinine 1.38


Sodium 140, potassium 4.8, BUN 68, creatinine 2.91


Admission labs:


White count 11.6 hemoglobin 12.8 platelets 432


Sodium 139 potassium 6 BUN 68 creatinine 3.64


CPK 1566, 2292


UA positive for leukoesterase trace, bacteria 7


Influenza type A, type B, RSV, COVID 19 [PCR]: Not detected


EKG tracing personally reviewed by me-normal sinus rhythm, rate 90/m


Chest x-ray film personally reviewed by me-right basal infiltrate.  Old right-

sided and left-sided rib fractures noted.


Previous labs:


BUN 18 creatinine 1.06








Assessment and plan:





-Right lower lobe pneumonia, suspect gram-negative organism


IV ceftriaxone 2 g daily.  Ceftin 5 mg twice a day for 5 days





-Acute metabolic encephalopathy-from pneumonia: Better


Follow clinically








-Alcoholic  myopathy


PTOT.  Fall precautions





-Acute COPD exacerbation, in a current smoker: Better


DuoNeb, 4 times a day.  Symbicort 160/4.5 one puff twice a day.  Steroid taper





-Essential hypertension


DC Zestril because of hyperkalemia.  Clonidine 0.1 mg twice a day





-Acute kidney injury combination of ATN, prerenal.:  Improving


IV fluids.  Follow renal function.  Decrease IV fluids





-Severe hyperkalemia secondary to acute kidney injury and Zestril: Slow to respo

nd


Patient received Kayexalate.  Renal diet.  IV fluids.  Creatinine decreased from

 2.91 down to 1.04








-Acute rhabdomyolysis in the setting of renal failure: Better


IV fluids. 





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 

possible normal pressure hydrocephalus


Patient had a full [facility] cognitive and assessment testing in which she 

obtained a score of 13/30.  [This was done in June of this 2021]





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


Patient has declined further intervention in the past.





-DO NOT RESUSCITATE








Disposition:


[facility] on





Patient Condition at Discharge: Fair





Plan - Discharge Summary


Discharge Rx Participant: No


New Discharge Prescriptions: 


New


   Cefuroxime Axetil [Ceftin] 500 mg PO BID #10 tab


   Budesonide/Formoterol Fumarate [Symbicort 160-4.5 Mcg Inhaler] 1 puff 

INHALATION BID #10.2 gm


   predniSONE 10 mg PO DAILY #30 tab





Continue


   lisinopriL [Zestril] 10 mg PO DAILY


   Citalopram Hydrobromide [CeleXA] 30 mg PO DAILY


   Fenofibrate Nanocrystallized [Fenofibrate] 145 mg PO HS


   Aspirin EC [Ecotrin Low Dose] 81 mg PO DAILY





Changed


   Ipratropium-Albuterol Nebulize [Duoneb 0.5 mg-3 mg/3 ml Soln] 3 ml INHALATION

RT-QID #0


Discharge Medication List





Citalopram Hydrobromide [CeleXA] 30 mg PO DAILY 01/21/15 [History]


lisinopriL [Zestril] 10 mg PO DAILY 01/21/15 [History]


Aspirin EC [Ecotrin Low Dose] 81 mg PO DAILY 12/23/20 [History]


Fenofibrate Nanocrystallized [Fenofibrate] 145 mg PO HS 12/23/20 [History]


Budesonide/Formoterol Fumarate [Symbicort 160-4.5 Mcg Inhaler] 1 puff INHALATION

BID #10.2 gm 10/27/21 [Rx]


Cefuroxime Axetil [Ceftin] 500 mg PO BID #10 tab 10/27/21 [Rx]


Ipratropium-Albuterol Nebulize [Duoneb 0.5 mg-3 mg/3 ml Soln] 3 ml INHALATION 

RT-QID #0 10/27/21 [Rx]


predniSONE 10 mg PO DAILY #30 tab 10/27/21 [Rx]








Follow up Appointment(s)/Referral(s): 


José Manuel Flores DO [Primary Care Provider] - 1-2 days


Activity/Diet/Wound Care/Special Instructions: 


 Medi

## 2021-12-17 ENCOUNTER — HOSPITAL ENCOUNTER (EMERGENCY)
Dept: HOSPITAL 47 - EC | Age: 86
Discharge: HOME | End: 2021-12-17
Payer: MEDICARE

## 2021-12-17 VITALS
HEART RATE: 99 BPM | RESPIRATION RATE: 16 BRPM | SYSTOLIC BLOOD PRESSURE: 112 MMHG | DIASTOLIC BLOOD PRESSURE: 79 MMHG | TEMPERATURE: 97.6 F

## 2021-12-17 DIAGNOSIS — M19.90: ICD-10-CM

## 2021-12-17 DIAGNOSIS — E78.5: ICD-10-CM

## 2021-12-17 DIAGNOSIS — Z86.73: ICD-10-CM

## 2021-12-17 DIAGNOSIS — F17.200: ICD-10-CM

## 2021-12-17 DIAGNOSIS — F32.A: ICD-10-CM

## 2021-12-17 DIAGNOSIS — I10: ICD-10-CM

## 2021-12-17 DIAGNOSIS — Z87.442: ICD-10-CM

## 2021-12-17 DIAGNOSIS — R53.1: Primary | ICD-10-CM

## 2021-12-17 DIAGNOSIS — Z79.82: ICD-10-CM

## 2021-12-17 LAB
ALBUMIN SERPL-MCNC: 3.5 G/DL (ref 3.5–5)
ALP SERPL-CCNC: 49 U/L (ref 38–126)
ALT SERPL-CCNC: 35 U/L (ref 4–49)
ANION GAP SERPL CALC-SCNC: 13 MMOL/L
AST SERPL-CCNC: 77 U/L (ref 17–59)
BASOPHILS # BLD AUTO: 0 K/UL (ref 0–0.2)
BASOPHILS NFR BLD AUTO: 0 %
BUN SERPL-SCNC: 48 MG/DL (ref 9–20)
CALCIUM SPEC-MCNC: 10.6 MG/DL (ref 8.4–10.2)
CHLORIDE SERPL-SCNC: 105 MMOL/L (ref 98–107)
CO2 SERPL-SCNC: 20 MMOL/L (ref 22–30)
EOSINOPHIL # BLD AUTO: 0 K/UL (ref 0–0.7)
EOSINOPHIL NFR BLD AUTO: 0 %
ERYTHROCYTE [DISTWIDTH] IN BLOOD BY AUTOMATED COUNT: 4.25 M/UL (ref 4.3–5.9)
ERYTHROCYTE [DISTWIDTH] IN BLOOD: 14.5 % (ref 11.5–15.5)
GLUCOSE SERPL-MCNC: 97 MG/DL (ref 74–99)
HCT VFR BLD AUTO: 37.5 % (ref 39–53)
HGB BLD-MCNC: 12.1 GM/DL (ref 13–17.5)
LYMPHOCYTES # SPEC AUTO: 1.1 K/UL (ref 1–4.8)
LYMPHOCYTES NFR SPEC AUTO: 13 %
MCH RBC QN AUTO: 28.4 PG (ref 25–35)
MCHC RBC AUTO-ENTMCNC: 32.3 G/DL (ref 31–37)
MCV RBC AUTO: 88.1 FL (ref 80–100)
MONOCYTES # BLD AUTO: 0.4 K/UL (ref 0–1)
MONOCYTES NFR BLD AUTO: 5 %
NEUTROPHILS # BLD AUTO: 6.6 K/UL (ref 1.3–7.7)
NEUTROPHILS NFR BLD AUTO: 79 %
PLATELET # BLD AUTO: 684 K/UL (ref 150–450)
POTASSIUM SERPL-SCNC: 5.1 MMOL/L (ref 3.5–5.1)
PROT SERPL-MCNC: 6.6 G/DL (ref 6.3–8.2)
SODIUM SERPL-SCNC: 138 MMOL/L (ref 137–145)
WBC # BLD AUTO: 8.4 K/UL (ref 3.8–10.6)

## 2021-12-17 PROCEDURE — 93005 ELECTROCARDIOGRAM TRACING: CPT

## 2021-12-17 PROCEDURE — 71046 X-RAY EXAM CHEST 2 VIEWS: CPT

## 2021-12-17 PROCEDURE — 83880 ASSAY OF NATRIURETIC PEPTIDE: CPT

## 2021-12-17 PROCEDURE — 99285 EMERGENCY DEPT VISIT HI MDM: CPT

## 2021-12-17 PROCEDURE — 85025 COMPLETE CBC W/AUTO DIFF WBC: CPT

## 2021-12-17 PROCEDURE — 80053 COMPREHEN METABOLIC PANEL: CPT

## 2021-12-17 PROCEDURE — 36415 COLL VENOUS BLD VENIPUNCTURE: CPT

## 2021-12-17 NOTE — ED
General Adult HPI





- General


Chief complaint: Shortness of Breath


Stated complaint: Poss Low O2


Time Seen by Provider: 21 09:13


Source: patient, RN notes reviewed


Mode of arrival: EMS


Limitations: no limitations





- History of Present Illness


Initial comments: 


86-year-old sent over from nursing home for low pulse ox.  Patient has no 

complaints himself is awake alert and oriented 1 which is his baseline.  

Patient states he has no chest pain or shortness breath no fevers or chills no 

cough.  Patient's denies fevers chills no abdominal pain.  Patient had negative 

COVID-19 test yesterday.








- Related Data


                                Home Medications











 Medication  Instructions  Recorded  Confirmed


 


Citalopram Hydrobromide [CeleXA] 30 mg PO DAILY@0800 01/21/15 12/17/21


 


Aspirin 81 mg PO DAILY@0800 21


 


Fenofibrate,Micronized 200 mg PO DAILY@0700 21





[Fenofibrate]   


 


Fluticasone/Vilanterol [Breo 1 puff INHALATION RT-DAILY 21





Ellipta 100-25 Mcg Inhaler]   


 


Ipratropium-Albuterol Nebulize 3 ml INHALATION RT-Q4H 21





[Duoneb 0.5 mg-3 mg/3 ml Soln]   


 


Multivitamins, Thera [Multivitamin 1 tab PO DAILY 21





(formulary)]   


 


Proheal 30 ml PO BID 21


 


cloNIDine HCL 0.1 mg PO BID@0800,1600 21











                                    Allergies











Allergy/AdvReac Type Severity Reaction Status Date / Time


 


adhesive tape Allergy  Rash/Hives Verified 21 10:31














Review of Systems


ROS Statement: 


Those systems with pertinent positive or pertinent negative responses have been 

documented in the HPI.





ROS Other: All systems not noted in ROS Statement are negative.





Past Medical History


Past Medical History: CVA/TIA, Hyperlipidemia, Hypertension, Osteoarthritis (OA)


Additional Past Medical History / Comment(s): tia, alcoholic, freq falls. past 

lt humeral/lt femur fx, kidney stone 40 plus years ago. at times tremors, irreg 

sleep pattern


History of Any Multi-Drug Resistant Organisms: None Reported


Past Surgical History: Hernia Repair, Joint Replacement, Orthopedic Surgery


Additional Past Surgical History / Comment(s): SHOULDER LEFT 25 YEARS AGO, lt 

hip hemiarthroplasty


Past Anesthesia/Blood Transfusion Reactions: No Reported Reaction


Past Psychological History: Depression


Smoking Status: Current every day smoker


Past Alcohol Use History: None Reported


Past Drug Use History: None Reported





- Past Family History


  ** Mother


Family Medical History: Hypertension





  ** Father


Additional Family Medical History / Comment(s):  in [facility] when pt was 12 years

old





General Exam


Limitations: no limitations


General appearance: alert, in no apparent distress


Head exam: Present: atraumatic, normocephalic, normal inspection


Eye exam: Present: normal appearance, PERRL, EOMI.  Absent: scleral icterus, 

conjunctival injection, periorbital swelling


Respiratory exam: Present: normal lung sounds bilaterally.  Absent: respiratory 

distress, wheezes, rales, rhonchi, stridor


Cardiovascular Exam: Present: regular rate, normal rhythm, normal heart sounds. 

Absent: systolic murmur, diastolic murmur, rubs, gallop, clicks


GI/Abdominal exam: Present: soft, normal bowel sounds.  Absent: distended, 

tenderness, guarding, rebound, rigid





Course





                                   Vital Signs











  21





  09:10


 


Temperature 97.6 F


 


Pulse Rate 99


 


Respiratory 16





Rate 


 


Blood Pressure 112/79


 


O2 Sat by Pulse 98





Oximetry 














Medical Decision Making





- Medical Decision Making





Pulse ox is within normal limits, patient is not dyspneic x-ray shows more 

chronic changes with no definite acute findings.  Patient's laboratory 

unremarkable EKG at baseline.





- Lab Data


Result diagrams: 


                                 21 09:47





                                 21 09:47





                                   Lab Results











  21 Range/Units





  09:47 09:47 09:47 


 


WBC  8.4    (3.8-10.6)  k/uL


 


RBC  4.25 L    (4.30-5.90)  m/uL


 


Hgb  12.1 L    (13.0-17.5)  gm/dL


 


Hct  37.5 L    (39.0-53.0)  %


 


MCV  88.1    (80.0-100.0)  fL


 


MCH  28.4    (25.0-35.0)  pg


 


MCHC  32.3    (31.0-37.0)  g/dL


 


RDW  14.5    (11.5-15.5)  %


 


Plt Count  684 H    (150-450)  k/uL


 


MPV  7.5    


 


Neutrophils %  79    %


 


Lymphocytes %  13    %


 


Monocytes %  5    %


 


Eosinophils %  0    %


 


Basophils %  0    %


 


Neutrophils #  6.6    (1.3-7.7)  k/uL


 


Lymphocytes #  1.1    (1.0-4.8)  k/uL


 


Monocytes #  0.4    (0-1.0)  k/uL


 


Eosinophils #  0.0    (0-0.7)  k/uL


 


Basophils #  0.0    (0-0.2)  k/uL


 


Hypochromasia  Slight    


 


Sodium   138   (137-145)  mmol/L


 


Potassium   5.1   (3.5-5.1)  mmol/L


 


Chloride   105   ()  mmol/L


 


Carbon Dioxide   20 L   (22-30)  mmol/L


 


Anion Gap   13   mmol/L


 


BUN   48 H   (9-20)  mg/dL


 


Creatinine   1.22   (0.66-1.25)  mg/dL


 


Est GFR (CKD-EPI)AfAm   63   (>60 ml/min/1.73 sqM)  


 


Est GFR (CKD-EPI)NonAf   54   (>60 ml/min/1.73 sqM)  


 


Glucose   97   (74-99)  mg/dL


 


Calcium   10.6 H   (8.4-10.2)  mg/dL


 


Total Bilirubin   0.9   (0.2-1.3)  mg/dL


 


AST   77 H   (17-59)  U/L


 


ALT   35   (4-49)  U/L


 


Alkaline Phosphatase   49   ()  U/L


 


NT-Pro-B Natriuret Pep    2690  pg/mL


 


Total Protein   6.6   (6.3-8.2)  g/dL


 


Albumin   3.5   (3.5-5.0)  g/dL














Disposition


Clinical Impression: 


 Chronic disease, Weakness





Disposition: HOME SELF-CARE


Condition: Stable


Instructions (If sedation given, give patient instructions):  Chronic Cough (ED)


Additional Instructions: 


Please return to the Emergency Department if symptoms worsen or any other 

concerns.


Is patient prescribed a controlled substance at d/c from ED?: No


Referrals: 


José Manuel Flores DO [Primary Care Provider] - 1-2 days


Time of Disposition: 11:29

## 2021-12-17 NOTE — XR
EXAMINATION TYPE: XR chest 2V

 

DATE OF EXAM: 12/17/2021

 

COMPARISON: 10/25/2021

 

TECHNIQUE: PA and lateral views submitted.

 

HISTORY: Cough

 

FINDINGS:

Hyperinflation. Diffuse osteopenia with postsurgical change involving the left humerus and evidence o
f remote trauma involving the rib cage and clavicle. Diffuse interstitial pattern. Tiny bilateral eff
usions. Atherosclerotic change aorta and degenerative change of the spine.

 

IMPRESSION:

1. COPD, cardiomegaly correlate for venous congestion and mild CHF versus interstitial pneumonitis.